# Patient Record
Sex: FEMALE | Race: WHITE | NOT HISPANIC OR LATINO | Employment: STUDENT | URBAN - METROPOLITAN AREA
[De-identification: names, ages, dates, MRNs, and addresses within clinical notes are randomized per-mention and may not be internally consistent; named-entity substitution may affect disease eponyms.]

---

## 2017-01-24 DIAGNOSIS — N95.1 SYMPTOMS, SUCH AS FLUSHING, SLEEPLESSNESS, HEADACHE, LACK OF CONCENTRATION, ASSOCIATED WITH THE MENOPAUSE: ICD-10-CM

## 2017-01-24 DIAGNOSIS — K64.4 EXTERNAL HEMORRHOIDS: Primary | ICD-10-CM

## 2017-01-26 RX ORDER — BENZOCAINE/MENTHOL 6 MG-10 MG
LOZENGE MUCOUS MEMBRANE
Qty: 28 G | Refills: 0 | Status: SHIPPED | OUTPATIENT
Start: 2017-01-26 | End: 2017-08-14

## 2017-01-26 RX ORDER — GABAPENTIN 300 MG/1
CAPSULE ORAL
Qty: 270 CAPSULE | Refills: 2 | Status: SHIPPED | OUTPATIENT
Start: 2017-01-26 | End: 2017-08-14

## 2017-01-26 NOTE — TELEPHONE ENCOUNTER
Pt stated that the pain from her hemorrhpids is occasional and comes and goes. Pt stated there have been periods of time without the pain from her hemorrhoid. Pt is requesting a refill incase she needs it.  Pt is also stating she takes gabapentin 600mg a day, and pt still has hot flashes and is wondering if the dose needs to be increased, still does not want to start HRT.   Note routed to Dr. Arceo to review and advise.

## 2017-01-26 NOTE — TELEPHONE ENCOUNTER
Ok to refill the cream. Is she doing 300mg BID on the gabapentin or 600mg all at once? The dosing is 300mg TID so can definitely increase to that and see if helps. If inadequate after a month or two should come back and see me to discuss other options aside from HRT

## 2017-01-26 NOTE — TELEPHONE ENCOUNTER
"Hydrocortisone 1%      Last Written Prescription Date:  9/12/16  Last Fill Quantity: 30,   # refills: 0  Last Office Visit with [facility] primary care provider:  9/12/16  Future Office visit: 9/15/17    POC note 9/12/16: \"Patient does have an acute hemorrhoid though thinks it's improving. rx sent in. If recurs or doesn't resolve will send a referral to [facility] for possible excision/banding.\"     Called pt and LMTCB. Did hemorrhoid ever resolve? Need to discuss if this is persisting for pt.   "

## 2017-01-26 NOTE — TELEPHONE ENCOUNTER
LM informing pt of information below. New Rx for gabapentin sent with TID instructions. Refills cream. Called pharmacy and cancelled Rx for BID

## 2017-03-31 ENCOUNTER — MYC MEDICAL ADVICE (OUTPATIENT)
Dept: OBGYN | Facility: CLINIC | Age: 47
End: 2017-03-31

## 2017-03-31 ENCOUNTER — TRANSFERRED RECORDS (OUTPATIENT)
Dept: HEALTH INFORMATION MANAGEMENT | Facility: CLINIC | Age: 47
End: 2017-03-31

## 2017-03-31 DIAGNOSIS — K64.9 HEMORRHOID: Primary | ICD-10-CM

## 2017-03-31 LAB
GLUCOSE SERPL-MCNC: 77 MG/DL (ref 65–99)
HBA1C MFR BLD: 5 % (ref 4–6)
TSH SERPL-ACNC: 0.82 UIU/ML (ref 0.3–5)

## 2017-03-31 NOTE — TELEPHONE ENCOUNTER
[facility] My guess is they will get her in with a PA first for eval and then they will likely recommend either flex sig or colonoscopy. Anyone at that clinic is great. Salo Noel, geetha.

## 2017-04-03 ENCOUNTER — TRANSFERRED RECORDS (OUTPATIENT)
Dept: HEALTH INFORMATION MANAGEMENT | Facility: CLINIC | Age: 47
End: 2017-04-03

## 2017-05-26 DIAGNOSIS — N95.1 SYMPTOMS, SUCH AS FLUSHING, SLEEPLESSNESS, HEADACHE, LACK OF CONCENTRATION, ASSOCIATED WITH THE MENOPAUSE: ICD-10-CM

## 2017-05-26 RX ORDER — GABAPENTIN 300 MG/1
CAPSULE ORAL
Qty: 270 CAPSULE | Refills: 1 | OUTPATIENT
Start: 2017-05-26

## 2017-05-26 NOTE — TELEPHONE ENCOUNTER
Gabapentin (Neurotin) 300 MG capsue-Take 1 capsule by mouth three times daily      Last Written Prescription Date:  1/26/17  Last Fill Quantity: 270 caps,   # refills: 2  Last Office Visit with [facility] primary care provider:  9/12/16-Annual  Future Office visit: 9/15/17-Annual    Refill request too soon. Rx denied as pt was sent a 9 month supply on 1/26/17.     Closing encounter.

## 2017-06-22 ENCOUNTER — HOSPITAL ENCOUNTER (OUTPATIENT)
Facility: CLINIC | Age: 47
Discharge: HOME | End: 2017-06-22
Attending: COLON & RECTAL SURGERY | Admitting: COLON & RECTAL SURGERY
Payer: COMMERCIAL

## 2017-06-22 ENCOUNTER — SURGERY (OUTPATIENT)
Age: 47
End: 2017-06-22

## 2017-06-22 VITALS
RESPIRATION RATE: 17 BRPM | WEIGHT: 143.3 LBS | HEIGHT: 62 IN | OXYGEN SATURATION: 95 % | BODY MASS INDEX: 26.37 KG/M2 | DIASTOLIC BLOOD PRESSURE: 58 MMHG | SYSTOLIC BLOOD PRESSURE: 88 MMHG

## 2017-06-22 LAB — COLONOSCOPY: NORMAL

## 2017-06-22 PROCEDURE — G0121 COLON CA SCRN NOT HI RSK IND: HCPCS | Performed by: COLON & RECTAL SURGERY

## 2017-06-22 PROCEDURE — 25000128 H RX IP 250 OP 636: Performed by: COLON & RECTAL SURGERY

## 2017-06-22 PROCEDURE — G0500 MOD SEDAT ENDO SERVICE >5YRS: HCPCS | Performed by: COLON & RECTAL SURGERY

## 2017-06-22 PROCEDURE — 45378 DIAGNOSTIC COLONOSCOPY: CPT | Performed by: COLON & RECTAL SURGERY

## 2017-06-22 PROCEDURE — 25000125 ZZHC RX 250: Performed by: COLON & RECTAL SURGERY

## 2017-06-22 RX ORDER — NALOXONE HYDROCHLORIDE 0.4 MG/ML
.1-.4 INJECTION, SOLUTION INTRAMUSCULAR; INTRAVENOUS; SUBCUTANEOUS
Status: CANCELLED | OUTPATIENT
Start: 2017-06-22 | End: 2017-06-23

## 2017-06-22 RX ORDER — PROCHLORPERAZINE MALEATE 5 MG
5-10 TABLET ORAL EVERY 6 HOURS PRN
Status: CANCELLED | OUTPATIENT
Start: 2017-06-22

## 2017-06-22 RX ORDER — FLUMAZENIL 0.1 MG/ML
0.2 INJECTION, SOLUTION INTRAVENOUS
Status: CANCELLED | OUTPATIENT
Start: 2017-06-22 | End: 2017-06-22

## 2017-06-22 RX ORDER — ONDANSETRON 2 MG/ML
4 INJECTION INTRAMUSCULAR; INTRAVENOUS
Status: DISCONTINUED | OUTPATIENT
Start: 2017-06-22 | End: 2017-06-22 | Stop reason: HOSPADM

## 2017-06-22 RX ORDER — ONDANSETRON 2 MG/ML
4 INJECTION INTRAMUSCULAR; INTRAVENOUS EVERY 6 HOURS PRN
Status: CANCELLED | OUTPATIENT
Start: 2017-06-22

## 2017-06-22 RX ORDER — LIDOCAINE 40 MG/G
CREAM TOPICAL
Status: DISCONTINUED | OUTPATIENT
Start: 2017-06-22 | End: 2017-06-22 | Stop reason: HOSPADM

## 2017-06-22 RX ORDER — ONDANSETRON 4 MG/1
4 TABLET, ORALLY DISINTEGRATING ORAL EVERY 6 HOURS PRN
Status: CANCELLED | OUTPATIENT
Start: 2017-06-22

## 2017-06-22 RX ORDER — FENTANYL CITRATE 50 UG/ML
INJECTION, SOLUTION INTRAMUSCULAR; INTRAVENOUS PRN
Status: DISCONTINUED | OUTPATIENT
Start: 2017-06-22 | End: 2017-06-22 | Stop reason: HOSPADM

## 2017-06-22 RX ADMIN — MIDAZOLAM HYDROCHLORIDE 2 MG: 1 INJECTION, SOLUTION INTRAMUSCULAR; INTRAVENOUS at 09:35

## 2017-06-22 RX ADMIN — FENTANYL CITRATE 100 MCG: 50 INJECTION, SOLUTION INTRAMUSCULAR; INTRAVENOUS at 09:35

## 2017-06-22 NOTE — H&P
Pre-Endoscopy History and Physical     Shital Parrish MRN# [id number]   YOB: 1970 Age: 46 year old     Date of Procedure: 6/22/2017  Primary care provider: Tim Hill  Type of Endoscopy: colonoscopy  Reason for Procedure: screening  Type of Anesthesia Anticipated: moderate sedation    HPI:    Shital is a 46 year old female who will be undergoing the above procedure. Patient denies a change in her bowel habits or bleeding. She has intermittent pain but has only had one episode in past 6 weeks.  Patient's mother had polyps in her 50s.       A history and physical has been performed. The patient's medications and allergies have been reviewed. The risks and benefits of the procedure and the sedation options and risks were discussed with the patient.  All questions were answered and informed consent was obtained.      She denies a personal or family history of anesthesia complications or bleeding disorders.     Prior to Admission medications    Medication Sig Start Date End Date Taking? Authorizing Provider   METHIMAZOLE PO    Yes Reported, Patient   gabapentin (NEURONTIN) 300 MG capsule Take 300mg po TID 1/26/17  Yes Shauna Arceo MD   Omega-3 Fatty Acids (OMEGA-3 FISH OIL PO)    Yes Reported, Patient   Cholecalciferol (VITAMIN D PO) Take  by mouth.   Yes Reported, Patient   Ascorbic Acid (VITAMIN C PO) Take  by mouth.   Yes Reported, Patient   hydrocortisone (CORTAID) 1 % cream APPLY SPARINGLY TO AFFECTED AREA THREE TIMES DAILY FOR 14 DAYS. 1/26/17   Shauna Arceo MD       Allergies   Allergen Reactions     Penicillins      Amoxicillin Rash        Current Facility-Administered Medications   Medication     lidocaine 1 % 1 mL     lidocaine (LMX4) cream     sodium chloride (PF) 0.9% PF flush 3 mL     sodium chloride (PF) 0.9% PF flush 3 mL     sodium chloride (PF) 0.9% PF flush 3 mL     ondansetron (ZOFRAN) injection 4 mg       Patient Active Problem List   Diagnosis     CARDIOVASCULAR SCREENING; LDL GOAL  LESS THAN 160     Hyperthyroidism     S/P abdominal supracervical subtotal hysterectomy     Elevated fasting glucose        Past Medical History:   Diagnosis Date     Edema 10/24/2013     Gastro-oesophageal reflux disease      Heavy periods 10/24/2013     Hyperthyroidism 2014    borderline-treated with a med for 6 months in [place] and then numbers improved. had a low TSH here and referred to [facility]. numbers there were borderline but normal and no antibodies. rec. -I123 uptake was increased so Graves dz dx'd. may do methimazole if repeat TFTs show hyperthyroid     Leiomyoma of uterus, unspecified 2014     Nipple discharge     right side only. neg mammo and right breast u/s. MRI pending     PONV (postoperative nausea and vomiting)      Varicose veins of lower extremities with other complications 10/24/2013        Past Surgical History:   Procedure Laterality Date     APPENDECTOMY        SECTION       CHOLECYSTECTOMY, LAPOROSCOPIC  2012    Cholecystectomy, Laparoscopic     HYSTERECTOMY SUPRACERVICAL N/A 2014    Procedure: HYSTERECTOMY SUPRACERVICAL;  Surgeon: Shauna Arceo MD;  Location: [facility] and [facility] done by Adis/ [facility]ora. started as laparoscopic but converted to open due to adhesions     LAPAROSCOPIC CYSTECTOMY OVARIAN (ONCOLOGY)      right. benign cyst. partial oopherectomy in [facility]     LAPAROSCOPIC SALPINGO-OOPHORECTOMY  14     MYOMECTOMY UTERUS  2008    hysteroscopic     TONSILLECTOMY         Social History   Substance Use Topics     Smoking status: Never Smoker     Smokeless tobacco: Never Used     Alcohol use No       Family History   Problem Relation Age of Onset     DIABETES Mother      CANCER Mother      melanoma     Colon Polyps Mother      DIABETES Maternal Grandmother      Breast Cancer Maternal Aunt      Thyroid Disease Cousin      Asthma No family hx of        REVIEW OF SYSTEMS:     5 point ROS negative except as noted above in HPI, including  "Gen., Resp., CV, GI &  system review.      PHYSICAL EXAM:   BP 98/76  Resp 16  Ht 1.575 m (5' 2\")  Wt 65 kg (143 lb 4.8 oz)  LMP 10/26/2014  SpO2 97%  BMI 26.21 kg/m2 Estimated body mass index is 26.21 kg/(m^2) as calculated from the following:    Height as of this encounter: 1.575 m (5' 2\").    Weight as of this encounter: 65 kg (143 lb 4.8 oz).   GENERAL APPEARANCE: healthy  MENTAL STATUS: alert  AIRWAY EXAM: Mallampatti Class I (visualization of the soft palate, fauces, uvula, anterior and posterior pillars)  RESP: lungs clear to auscultation - no rales, rhonchi or wheezes  CV: regular rates and rhythm      DIAGNOSTICS:    Not indicated      IMPRESSION   ASA Class 2 - Mild systemic disease        PLAN:       Colonoscopy with possible polypectomy, possible biopsy. The indications, procedure and risks were explained to the patient who agrees to proceed.       The above has been forwarded to the consulting provider.      Signed Electronically by: Shannon Noel  June 22, 2017          "

## 2017-06-22 NOTE — BRIEF OP NOTE
[facility] Brief Operative Note    Pre-operative diagnosis: FAMILY HISTORY OF POLYPS   Post-operative diagnosis normal colon     Procedure: Procedure(s):  COLONOSCOPY - Wound Class: II-Clean Contaminated   Surgeon(s): Surgeon(s) and Role:     * Shannon Noel MD - Primary   Estimated blood loss: * No values recorded between 6/22/2017 12:00 AM and 6/22/2017 10:09 AM *    Specimens: * No specimens in log *   Findings: See Provation procedure note in Epic

## 2017-06-28 ENCOUNTER — TRANSFERRED RECORDS (OUTPATIENT)
Dept: HEALTH INFORMATION MANAGEMENT | Facility: CLINIC | Age: 47
End: 2017-06-28

## 2017-07-07 ENCOUNTER — TRANSFERRED RECORDS (OUTPATIENT)
Dept: HEALTH INFORMATION MANAGEMENT | Facility: CLINIC | Age: 47
End: 2017-07-07

## 2017-08-14 ENCOUNTER — TRANSFERRED RECORDS (OUTPATIENT)
Dept: HEALTH INFORMATION MANAGEMENT | Facility: CLINIC | Age: 47
End: 2017-08-14

## 2017-08-14 ENCOUNTER — OFFICE VISIT (OUTPATIENT)
Dept: OBGYN | Facility: CLINIC | Age: 47
End: 2017-08-14
Payer: COMMERCIAL

## 2017-08-14 ENCOUNTER — RADIANT APPOINTMENT (OUTPATIENT)
Dept: MAMMOGRAPHY | Facility: CLINIC | Age: 47
End: 2017-08-14
Payer: COMMERCIAL

## 2017-08-14 VITALS
WEIGHT: 156.4 LBS | SYSTOLIC BLOOD PRESSURE: 96 MMHG | DIASTOLIC BLOOD PRESSURE: 60 MMHG | HEIGHT: 62 IN | HEART RATE: 82 BPM | BODY MASS INDEX: 28.78 KG/M2

## 2017-08-14 DIAGNOSIS — E05.90 HYPERTHYROIDISM: ICD-10-CM

## 2017-08-14 DIAGNOSIS — Z79.890 POST-MENOPAUSE ON HRT (HORMONE REPLACEMENT THERAPY): ICD-10-CM

## 2017-08-14 DIAGNOSIS — Z01.419 ENCOUNTER FOR GYNECOLOGICAL EXAMINATION WITHOUT ABNORMAL FINDING: Primary | ICD-10-CM

## 2017-08-14 DIAGNOSIS — Z12.31 VISIT FOR SCREENING MAMMOGRAM: ICD-10-CM

## 2017-08-14 DIAGNOSIS — N95.1 SYMPTOMS, SUCH AS FLUSHING, SLEEPLESSNESS, HEADACHE, LACK OF CONCENTRATION, ASSOCIATED WITH THE MENOPAUSE: ICD-10-CM

## 2017-08-14 DIAGNOSIS — Z90.711 S/P ABDOMINAL SUPRACERVICAL SUBTOTAL HYSTERECTOMY: ICD-10-CM

## 2017-08-14 PROCEDURE — 99396 PREV VISIT EST AGE 40-64: CPT | Performed by: OBSTETRICS & GYNECOLOGY

## 2017-08-14 PROCEDURE — 77063 BREAST TOMOSYNTHESIS BI: CPT | Mod: TC

## 2017-08-14 PROCEDURE — G0202 SCR MAMMO BI INCL CAD: HCPCS | Mod: TC

## 2017-08-14 RX ORDER — GABAPENTIN 300 MG/1
CAPSULE ORAL
Qty: 270 CAPSULE | Refills: 2 | Status: SHIPPED | OUTPATIENT
Start: 2017-08-14 | End: 2017-11-24

## 2017-08-14 RX ORDER — ESTRADIOL 0.1 MG/D
1 FILM, EXTENDED RELEASE TRANSDERMAL
Qty: 24 PATCH | Refills: 3 | Status: SHIPPED | OUTPATIENT
Start: 2017-08-14 | End: 2018-07-11

## 2017-08-14 ASSESSMENT — ANXIETY QUESTIONNAIRES
5. BEING SO RESTLESS THAT IT IS HARD TO SIT STILL: NOT AT ALL
6. BECOMING EASILY ANNOYED OR IRRITABLE: NOT AT ALL
1. FEELING NERVOUS, ANXIOUS, OR ON EDGE: NOT AT ALL
2. NOT BEING ABLE TO STOP OR CONTROL WORRYING: NOT AT ALL
GAD7 TOTAL SCORE: 0
3. WORRYING TOO MUCH ABOUT DIFFERENT THINGS: NOT AT ALL
7. FEELING AFRAID AS IF SOMETHING AWFUL MIGHT HAPPEN: NOT AT ALL

## 2017-08-14 ASSESSMENT — PATIENT HEALTH QUESTIONNAIRE - PHQ9
SUM OF ALL RESPONSES TO PHQ QUESTIONS 1-9: 2
5. POOR APPETITE OR OVEREATING: NOT AT ALL

## 2017-08-14 NOTE — PROGRESS NOTES
Shital is a 46 year old  female who presents for annual exam.     Besides routine health maintenance, she has no other health concerns today .    HPI:  Had her hysterectomy in . Does have her cvx and ovaries though. Has been getting hot flashes and night sweats almost since that time. However also had recurrence of her hyperthyroidism at the same time so was never clear what was causing what sx.   Patient is now seeing Carrasco for endo and on meds for her thyroid. Labs are unchanged. Was going to consider stopping her meds but now told her to stay on them 3 more months and repeat labs. Has about 2 more months before recheck.  Feels like her hot flashes and nightsweats aren't as sweaty as they used to be but all the sudden will just feel like her skin is burning. Having a lot of insomnia. Falling asleep is hard, staying asleep is hard. Definitely worsened recently when had some job renewal stress but now has her job for the year and sleep isn't better.   Doing the neurontin BID for nightsweats and hotflashes. Definitely some benefit from that but still not great. Isn't opposed to ERT and have discussed it before. Was hoping to avoid if possible but now that considering multiple different meds to control what just ERT could control is open to trying it.    GYNECOLOGIC HISTORY:    Patient's last menstrual period was 10/26/2014.. Using hysterectomy for contraception.    reports that she has never smoked. She has never used smokeless tobacco.      Patient is sexually active.  STD testing offered?  Declined  Patient is sexually active.  Last PHQ-9 score on record=   PHQ-9 SCORE 2017   Total Score 2         HEALTH MAINTENANCE:  Cholesterol: 16   Total= 199, Triglycerides=91, HDL=58, MIL=737, FBS=95, TSH=3/31/17-0.82 -patient is not fasting today  Last Mammo: 16, Result: normal, Next Mammo: today   Pap: 14 neg, HPV-  Colonoscopy: 17, Result: normal, Next Colonoscopy: 5 years    Health  maintenance updated:  yes    HISTORY:  Obstetric History       T0      L2     SAB2   TAB0   Ectopic0   Multiple0   Live Births2       # Outcome Date GA Lbr Rajesh/2nd Weight Sex Delivery Anes PTL Lv   6 Para 10/01/98    F CS-LTranv         Name: Robin   5 Para 96    F CS-LTranv         Name: Josh   4 TAB            3 TAB            2 SAB            1 SAB                 Past Medical History:   Diagnosis Date     Edema 10/24/2013     Gastro-oesophageal reflux disease      Heavy periods 10/24/2013     Hyperthyroidism 2014    borderline-treated with a med for 6 months in [facility] and then numbers improved. had a low TSH here and referred to [facility]. numbers there were borderline but normal and no antibodies. rec. -I123 uptake was increased so Graves dz dx'd. may do methimazole if repeat TFTs show hyperthyroid     Leiomyoma of uterus, unspecified 2014     Nipple discharge     right side only. neg mammo and right breast u/s. MRI pending     PONV (postoperative nausea and vomiting)      Varicose veins of lower extremities with other complications 10/24/2013     Past Surgical History:   Procedure Laterality Date     APPENDECTOMY        SECTION       CHOLECYSTECTOMY, LAPOROSCOPIC  2012    Cholecystectomy, Laparoscopic     COLONOSCOPY N/A 2017    Procedure: COLONOSCOPY;  COLONOSCOPY;  Surgeon: Shannon Noel MD;  Location:  [facility]     HYSTERECTOMY SUPRACERVICAL N/A 2014    Procedure: HYSTERECTOMY SUPRACERVICAL;  Surgeon: Shauna Arceo MD;  Location:  OR [facility] and LSO done by Adis/ Leora. started as laparoscopic but converted to open due to adhesions     LAPAROSCOPIC CYSTECTOMY OVARIAN (ONCOLOGY)      right. benign cyst. partial oopherectomy in [facility]     LAPAROSCOPIC SALPINGO-OOPHORECTOMY  14     MYOMECTOMY UTERUS  2008    hysteroscopic     TONSILLECTOMY  1979     Family History   Problem Relation Age of Onset     DIABETES Mother      CANCER  "Mother      melanoma     Colon Polyps Mother      DIABETES Maternal Grandmother      Breast Cancer Maternal Aunt      Thyroid Disease Cousin      Asthma No family hx of      Social History     Social History     Marital status:      Spouse name: Marianna     Number of children: 2     Years of education: 16     Occupational History     Program Leader      [employer]     Social History Main Topics     Smoking status: Never Smoker     Smokeless tobacco: Never Used     Alcohol use No     Drug use: No     Sexual activity: Yes     Partners: Male     Birth control/ protection: None      Comment: Premenopausal LMP 10/21/14     Other Topics Concern     Blood Transfusions No     Special Diet No     Exercise No     Bike Helmet Not Asked     NA     Seat Belt Yes     Social History Narrative    The patient was born in [facility]. She eats fruits and vegetables every day. She takes calcium supplement and vitamin D.        No advanced care directives on file               Current Outpatient Prescriptions:      gabapentin (NEURONTIN) 300 MG capsule, Take 300mg po TID, Disp: 270 capsule, Rfl: 2     estradiol (VIVELLE-DOT) 0.1 MG/24HR BIW patch, Place 1 patch onto the skin twice a week, Disp: 24 patch, Rfl: 3     METHIMAZOLE PO, , Disp: , Rfl:      Omega-3 Fatty Acids (OMEGA-3 FISH OIL PO), , Disp: , Rfl:      Cholecalciferol (VITAMIN D PO), Take  by mouth., Disp: , Rfl:      Ascorbic Acid (VITAMIN C PO), Take  by mouth., Disp: , Rfl:   Allergies   Allergen Reactions     Penicillins      Amoxicillin Rash       Past medical, surgical, social and family history were reviewed and updated in EPIC.    ROS:   12 point review of systems negative other than symptoms noted below.  Cardiovascular: Palpitations  Gastrointestinal: Bloating  Genitourinary: Vaginal Dryness  Endocrine: Excess Hair Growth    EXAM:  BP 96/60  Pulse 82  Ht 5' 2\" (1.575 m)  Wt 156 lb 6.4 oz (70.9 kg)  LMP 10/26/2014  BMI 28.61 kg/m2   BMI: " Body mass index is 28.61 kg/(m^2).    EXAM:  Constitutional: Appearance: Well nourished, well developed alert, in no acute distress  Neck:  Lymph Nodes:  No lymphadenopathy present    Thyroid:  Gland size normal, nontender, no nodules or masses present  on palpation  Chest:  Respiratory Effort:  Breathing unlabored  Cardiovascular:Heart    Auscultation:  Regular rate, normal rhythm, no murmurs present  Breasts: Palpation of Breasts and Axillae:  No masses present on palpation, no breast tenderness. and No nodularity, asymmetry or nipple discharge bilaterally.  Gastrointestinal:  Abdominal Examination:  Abdomen nontender to palpation, tone normal without     rigidity or guarding, no masses present, umbilicus without lesions    Liver and speen:  No hepatomegaly present, liver nontender to palpation    Hernias:  No hernias present  Lymphatic: Lymph Nodes:  No other lymphadenopathy present  Skin:  General Inspection:  No rashes present, no lesions present, no areas of  discoloration.    Genitalia and Groin:  No rashes present, no lesions present, no areas of  discoloration, no masses present  Neurologic/Psychiatric:    Mental Status:  Oriented X3     Pelvic Exam:  External Genitalia:     Normal appearance for age, no discharge present, no tenderness present, no inflammatory lesions present, color normal  Vagina:     Normal vaginal vault without central or paravaginal defects, no discharge present, no inflammatory lesions present, no masses present  Bladder:     Nontender to palpation  Urethra:   Urethral Body:  Urethra palpation normal, urethra structural support normal   Urethral Meatus:  No erythema or lesions present  Cervix:     Appearance healthy, no lesions present, nontender to palpation, no bleeding present  Uterus:     Surgically absent  Adnexa:     No adnexal tenderness present, no adnexal masses present  Perineum:     Perineum within normal limits, no evidence of trauma, no rashes or skin lesions  present  Anus:     Anus within normal limits, no hemorrhoids present  Inguinal Lymph Nodes:     No lymphadenopathy present  Pubic Hair:     Normal pubic hair distribution for age  Genitalia and Groin:     No rashes present, no lesions present, no areas of discoloration, no masses present      COUNSELING:   Reviewed preventive health counseling, as reflected in patient instructions  Special attention given to:        (Esther)menopause management    Body mass index is 28.61 kg/(m^2).  Weight management plan: deferred    ASSESSMENT:  46 year old female with satisfactory annual exam.    ICD-10-CM    1. Encounter for gynecological examination without abnormal finding Z01.419    2. Symptoms, such as flushing, sleeplessness, headache, lack of concentration, associated with the menopause N95.1 gabapentin (NEURONTIN) 300 MG capsule     estradiol (VIVELLE-DOT) 0.1 MG/24HR BIW patch   3. Post-menopause on HRT (hormone replacement therapy) Z79.890    4. S/P abdominal supracervical subtotal hysterectomy Z90.711    5. Hyperthyroidism E05.90        PLAN/PATIENT INSTRUCTIONS:    Pap is UTD for 2 more years and mammo today  Last fasting labs were normal last year. Not fasting this year.  Encouraged her to f/u with Dr. Carrasco and follow his recommendations on her hyperthyroidism. If he feels she can stop medication for a time then that is appropriate course of action.  Discussed her insomnia and hot flashes and nightsweats. Discussed continuing neurotin, otc sleep aids and prescription sleep aids. Discussed ERT and the risks of ERT vs HRT given that she only needs ERT.  Patient is interested in giving ERT a try. Will start at 0.1mg vivelle patch 2x/week given that she's only 46 and get sx on good control. Will follow up with me at end of December and determine how she's doing. Will then consider weaning to .075mg and then can slowly continue to wean down over time if able to. Patient is very agreeable with this plan.  Shauna Arceo,  MD

## 2017-08-14 NOTE — MR AVS SNAPSHOT
After Visit Summary   8/14/2017    Shital Parrish    MRN: [id number]           Patient Information     Date Of Birth          1970        Visit Information        Provider Department      8/14/2017 2:10 PM Shauna Arceo MD [facility]        Today's Diagnoses     Encounter for gynecological examination without abnormal finding    -  1    Symptoms, such as flushing, sleeplessness, headache, lack of concentration, associated with the menopause        Post-menopause on HRT (hormone replacement therapy)        S/P abdominal supracervical subtotal hysterectomy        Hyperthyroidism           Follow-ups after your visit        Your next 10 appointments already scheduled     Dec 27, 2017  3:30 PM CST   Office Visit with Shauna Arceo MD   [facility]    [address] [place] [id number] [phone]           Bring a current list of meds and any records pertaining to this visit. For Physicals, please bring immunization records and any forms needing to be filled out. Please arrive 10 minutes early to complete paperwork.              Who to contact     If you have questions or need follow up information about today's clinic visit or your schedule please contact [facility] directly at [phone].  Normal or non-critical lab and imaging results will be communicated to you by MyChart, letter or phone within 4 business days after the clinic has received the results. If you do not hear from us within 7 days, please contact the clinic through MyChart or phone. If you have a critical or abnormal lab result, we will notify you by phone as soon as possible.  Submit refill requests through My [employer] or call your pharmacy and they will forward the refill request to us. Please allow 3 business days for your refill to be completed.          Additional Information About Your Visit        MyChart  "Information     [facility] gives you secure access to your electronic health record. If you see a primary care provider, you can also send messages to your care team and make appointments. If you have questions, please call your primary care clinic.  If you do not have a primary care provider, please call [phone] and they will assist you.        Care EveryWhere ID     This is your Care EveryWhere ID. This could be used by other organizations to access your [place] medical records  [id number]        Your Vitals Were     Pulse Height Last Period BMI (Body Mass Index)          82 5' 2\" (1.575 m) 10/26/2014 28.61 kg/m2         Blood Pressure from Last 3 Encounters:   08/14/17 96/60   06/22/17 (!) 88/58   09/12/16 108/70    Weight from Last 3 Encounters:   08/14/17 156 lb 6.4 oz (70.9 kg)   06/22/17 143 lb 4.8 oz (65 kg)   09/12/16 149 lb (67.6 kg)              Today, you had the following     No orders found for display         Today's Medication Changes          These changes are accurate as of: 8/14/17 11:59 PM.  If you have any questions, ask your nurse or doctor.               Start taking these medicines.        Dose/Directions    estradiol 0.1 MG/24HR BIW patch   Commonly known as:  VIVELLE-DOT   Used for:  Symptoms, such as flushing, sleeplessness, headache, lack of concentration, associated with the menopause   Started by:  Shauna Arceo MD        Dose:  1 patch   Place 1 patch onto the skin twice a week   Quantity:  24 patch   Refills:  3            Where to get your medicines      These medications were sent to [facility] [address] [place] [employer] - [facility] [address]  [address] [facility] [address]     Phone:  [phone]     estradiol 0.1 MG/24HR BIW patch    gabapentin 300 MG capsule                Primary Care Provider Office Phone # Fax #    Tim Hill [phone]       [address] [facility] [address]        Equal Access to Services     " BRENNAN [facility]: Hadii aad roxie jhon Flor, waaxda luqadaha, qaybta kaalmada cassandra, mandie elsa lyssanikole trotter moizchaya early . So [facility] [phone].    ATENCIÓN: Si habla español, tiene a hobson disposición servicios gratuitos de asistencia lingüística. Llame al [phone].    We comply with applicable federal civil rights laws and [place] laws. We do not discriminate on the basis of race, color, national origin, age, disability sex, sexual orientation or gender identity.            Thank you!     Thank you for choosing [facility] [place]  for your care. Our goal is always to provide you with excellent care. Hearing back from our patients is one way we can continue to improve our services. Please take a few minutes to complete the written survey that you may receive in the mail after your visit with us. Thank you!             Your Updated Medication List - Protect others around you: Learn how to safely use, store and throw away your medicines at www.disposemymeds.org.          This list is accurate as of: 8/14/17 11:59 PM.  Always use your most recent med list.                   Brand Name Dispense Instructions for use Diagnosis    estradiol 0.1 MG/24HR BIW patch    VIVELLE-DOT    24 patch    Place 1 patch onto the skin twice a week    Symptoms, such as flushing, sleeplessness, headache, lack of concentration, associated with the menopause       gabapentin 300 MG capsule    NEURONTIN    270 capsule    Take 300mg po TID    Symptoms, such as flushing, sleeplessness, headache, lack of concentration, associated with the menopause       METHIMAZOLE PO           OMEGA-3 FISH OIL PO           VITAMIN C PO      Take  by mouth.        VITAMIN D PO      Take  by mouth.

## 2017-08-15 ASSESSMENT — ANXIETY QUESTIONNAIRES: GAD7 TOTAL SCORE: 0

## 2017-08-17 ENCOUNTER — TRANSFERRED RECORDS (OUTPATIENT)
Dept: HEALTH INFORMATION MANAGEMENT | Facility: CLINIC | Age: 47
End: 2017-08-17

## 2017-08-28 ENCOUNTER — TELEPHONE (OUTPATIENT)
Dept: OBGYN | Facility: CLINIC | Age: 47
End: 2017-08-28

## 2017-08-28 NOTE — TELEPHONE ENCOUNTER
Pt informed. Pt has stopped her neurontin as soon as you started patches. Z-quil she had been taking was stopped three days ago. Pt will cut her patches in half for about 1-2 weeks and then call us back with results.

## 2017-08-28 NOTE — TELEPHONE ENCOUNTER
It could definitely be the patches though it's unusual since her dose is normal starting dose in menopause. But it may just be too high for her. We can either send in 0.05mg patches and she can switch to those and see if it's better. Or she can just half of her patch off and save it so that she's doing 1/2 patch of the 0.1mg patch she has and see if her side effects are better. She may not get as much hot flash/nightsweat control on the lower dose but the nausea and dizziness may be better on that.    Let's try to get this sorted out first and if doing better in a week or 2 on the lower dose then have her call back to let us know taht and then can figure out what to do with the sleep. Is she still doing neurontin? Is she doing any otc sleep meds? Are her nightsweats/hotflashes gone on the patch at least?

## 2017-08-28 NOTE — TELEPHONE ENCOUNTER
PLEASE CALL PATIENT - RE: NEW MEDICATION, ESTROGEN - UNSURE IF SYMPTOMS ARE RELATED TO MEDICATION OR SOMETHING ELSE

## 2017-08-28 NOTE — TELEPHONE ENCOUNTER
8/14/17 Annual. Pt started estradiol (VIVELLE-DOT) 0.1 MG/24HR BIW patch last week. This is her second week and third patch. Pt is still not sleepy well. Pt is also feeling nausea and dizziness. No emesis Pt has feeling like she has been on a long ride and she is tired. Pt has tired eating it does not help the nausea. Pt is feeling dizzy and pt needs to lay down and rest. Pt denies fever or signs of flu like symptoms. BP's was 116/80 last week at other doctors office. Pt wondering if this is from her Patches or what else could be happening. Routing to Dr. Arceo. Please review and advise.     8/14/17 Discussed her insomnia and hot flashes and nightsweats. Discussed continuing neurotin, otc sleep aids and prescription sleep aids. Discussed ERT and the risks of ERT vs HRT given that she only needs ERT.  Patient is interested in giving ERT a try. Will start at 0.1mg vivelle patch 2x/week given that she's only 46 and get sx on good control. Will follow up with me at end of December and determine how she's doing. Will then consider weaning to .075mg and then can slowly continue to wean down over time if able to. Patient is very agreeable with this plan.

## 2017-11-24 ENCOUNTER — OFFICE VISIT (OUTPATIENT)
Dept: FAMILY MEDICINE | Facility: CLINIC | Age: 47
End: 2017-11-24
Payer: COMMERCIAL

## 2017-11-24 VITALS
DIASTOLIC BLOOD PRESSURE: 64 MMHG | HEART RATE: 80 BPM | HEIGHT: 63 IN | RESPIRATION RATE: 14 BRPM | SYSTOLIC BLOOD PRESSURE: 108 MMHG | TEMPERATURE: 98.1 F | WEIGHT: 161 LBS | BODY MASS INDEX: 28.53 KG/M2

## 2017-11-24 DIAGNOSIS — E05.90 HYPERTHYROIDISM: ICD-10-CM

## 2017-11-24 DIAGNOSIS — R73.01 ELEVATED FASTING GLUCOSE: ICD-10-CM

## 2017-11-24 DIAGNOSIS — Z00.00 HEALTH CARE MAINTENANCE: Primary | ICD-10-CM

## 2017-11-24 DIAGNOSIS — G47.00 INSOMNIA, UNSPECIFIED TYPE: ICD-10-CM

## 2017-11-24 LAB
ERYTHROCYTE [DISTWIDTH] IN BLOOD BY AUTOMATED COUNT: 12.3 % (ref 10–15)
HCT VFR BLD AUTO: 39.4 % (ref 35–47)
HGB BLD-MCNC: 13.3 G/DL (ref 11.7–15.7)
MCH RBC QN AUTO: 30.6 PG (ref 26.5–33)
MCHC RBC AUTO-ENTMCNC: 33.8 G/DL (ref 31.5–36.5)
MCV RBC AUTO: 91 FL (ref 78–100)
PLATELET # BLD AUTO: 264 10E9/L (ref 150–450)
RBC # BLD AUTO: 4.35 10E12/L (ref 3.8–5.2)
WBC # BLD AUTO: 8.6 10E9/L (ref 4–11)

## 2017-11-24 PROCEDURE — 36415 COLL VENOUS BLD VENIPUNCTURE: CPT | Performed by: FAMILY MEDICINE

## 2017-11-24 PROCEDURE — 84460 ALANINE AMINO (ALT) (SGPT): CPT | Performed by: FAMILY MEDICINE

## 2017-11-24 PROCEDURE — 99396 PREV VISIT EST AGE 40-64: CPT | Performed by: FAMILY MEDICINE

## 2017-11-24 PROCEDURE — 80048 BASIC METABOLIC PNL TOTAL CA: CPT | Performed by: FAMILY MEDICINE

## 2017-11-24 PROCEDURE — 80061 LIPID PANEL: CPT | Performed by: FAMILY MEDICINE

## 2017-11-24 PROCEDURE — 85027 COMPLETE CBC AUTOMATED: CPT | Performed by: FAMILY MEDICINE

## 2017-11-24 NOTE — MR AVS SNAPSHOT
After Visit Summary   11/24/2017    Shital Parrish    MRN: [id number]           Patient Information     Date Of Birth          1970        Visit Information        Provider Department      11/24/2017 10:00 AM Tim Hill MD [facility]        Today's Diagnoses     Health care maintenance    -  1    Hyperthyroidism        Elevated fasting glucose        Insomnia, unspecified type          Care Instructions      Preventive Health Recommendations  Female Ages 40 to 49    Yearly exam:     See your health care provider every year in order to  1. Review health changes.   2. Discuss preventive care.    3. Review your medicines if your doctor prescribed any.      Get a Pap test every three years (unless you have an abnormal result and your provider advises testing more often).      If you get Pap tests with HPV test, you only need to test every 5 years, unless you have an abnormal result. You do not need a Pap test if your uterus was removed (hysterectomy) and you have not had cancer.      You should be tested each year for STDs (sexually transmitted diseases), if you're at risk.       Ask your doctor if you should have a mammogram.      Have a colonoscopy (test for colon cancer) if someone in your family has had colon cancer or polyps before age 50.       Have a cholesterol test every 5 years.       Have a diabetes test (fasting glucose) after age 45. If you are at risk for diabetes, you should have this test every 3 years.    Shots: Get a flu shot each year. Get a tetanus shot every 10 years.     Nutrition:     Eat at least 5 servings of fruits and vegetables each day.    Eat whole-grain bread, whole-wheat pasta and brown rice instead of white grains and rice.    Talk to your provider about Calcium and Vitamin D.     Lifestyle    Exercise at least 150 minutes a week (an average of 30 minutes a day, 5 days a week). This will help you control your weight and prevent disease.    Limit  alcohol to one drink per day.    No smoking.     Wear sunscreen to prevent skin cancer.    See your dentist every six months for an exam and cleaning.  Preventive Health Recommendations  Female Ages 40 to 49    Yearly exam:   See your health care provider every year in order to  Review health changes.   Discuss preventive care.    Review your medicines if your doctor prescribed any.    Get a Pap test every three years (unless you have an abnormal result and your provider advises testing more often).    If you get Pap tests with HPV test, you only need to test every 5 years, unless you have an abnormal result. You do not need a Pap test if your uterus was removed (hysterectomy) and you have not had cancer.    You should be tested each year for STDs (sexually transmitted diseases), if you're at risk.     Ask your doctor if you should have a mammogram.    Have a colonoscopy (test for colon cancer) if someone in your family has had colon cancer or polyps before age 50.     Have a cholesterol test every 5 years.     Have a diabetes test (fasting glucose) after age 45. If you are at risk for diabetes, you should have this test every 3 years.    Shots: Get a flu shot each year. Get a tetanus shot every 10 years.     Nutrition:   Eat at least 5 servings of fruits and vegetables each day.  Eat whole-grain bread, whole-wheat pasta and brown rice instead of white grains and rice.  Talk to your provider about Calcium and Vitamin D.     Lifestyle  Exercise at least 150 minutes a week (an average of 30 minutes a day, 5 days a week). This will help you control your weight and prevent disease.  Limit alcohol to one drink per day.  No smoking.   Wear sunscreen to prevent skin cancer.  See your dentist every six months for an exam and cleaning.          Follow-ups after your visit        Additional Services     SLEEP EVALUATION & MANAGEMENT REFERRAL - [facility] [phone]  (Age 18 and up)        Please be aware that coverage of these services is subject to the terms and limitations of your health insurance plan.  Call member services at your health plan with any benefit or coverage questions.      Please bring the following to your appointment:    >>   List of current medications   >>   This referral request   >>   Any documents/labs given to you for this referral                      Your next 10 appointments already scheduled     Dec 08, 2017  8:30 AM CST   Office Visit with Shauna Arceo MD   [facility] ([facility])    [address] [place] [phone]           Bring a current list of meds and any records pertaining to this visit. For Physicals, please bring immunization records and any forms needing to be filled out. Please arrive 10 minutes early to complete paperwork.              Future tests that were ordered for you today     Open Future Orders        Priority Expected Expires Ordered    SLEEP EVALUATION & MANAGEMENT REFERRAL - ADULT -[facility] [phone]  (Age 18 and up) Routine  11/24/2018 11/24/2017            Who to contact     If you have questions or need follow up information about today's clinic visit or your schedule please contact [facility] [place] [place] directly at [phone].  Normal or non-critical lab and imaging results will be communicated to you by MyChart, letter or phone within 4 business days after the clinic has received the results. If you do not hear from us within 7 days, please contact the clinic through MyChart or phone. If you have a critical or abnormal lab result, we will notify you by phone as soon as possible.  Submit refill requests through [employer] or call your pharmacy and they will forward the refill request to us. Please allow 3 business days for your refill to be completed.          Additional Information About Your Visit        MyChart Information  "    [employer] gives you secure access to your electronic health record. If you see a primary care provider, you can also send messages to your care team and make appointments. If you have questions, please call your primary care clinic.  If you do not have a primary care provider, please call [phone] and they will assist you.        Care EveryWhere ID     This is your Care EveryWhere ID. This could be used by other organizations to access your [facility] medical records  [id number]        Your Vitals Were     Pulse Temperature Respirations Height Last Period BMI (Body Mass Index)    80 98.1  F (36.7  C) (Tympanic) 14 5' 3.31\" (1.608 m) 10/26/2014 28.24 kg/m2       Blood Pressure from Last 3 Encounters:   11/24/17 108/64   08/14/17 96/60   06/22/17 (!) 88/58    Weight from Last 3 Encounters:   11/24/17 161 lb (73 kg)   08/14/17 156 lb 6.4 oz (70.9 kg)   06/22/17 143 lb 4.8 oz (65 kg)              We Performed the Following     ALT     Basic metabolic panel  (Ca, Cl, CO2, Creat, Gluc, K, Na, BUN)     CBC with platelets     Lipid panel reflex to direct LDL Fasting        Primary Care Provider Office Phone # Fax #    Tim WHARTON MD [facility] [phone]       [address] [facility] [address]        Equal Access to Services     [facility]: Hadii antonio ku hadasho Sodebora, waaxda luqadaha, qaybta kaalmada adesaniyayada, mandie chaudhary. [facility] [phone].    ATENCIÓN: Si habla español, tiene a hobson disposición servicios gratuitos de asistencia lingüística. Llame al [phone].    We comply with applicable federal civil rights laws and [place] laws. We do not discriminate on the basis of race, color, national origin, age, disability, sex, sexual orientation, or gender identity.            Thank you!     Thank you for choosing [facility]  for your care. Our goal is always to provide you with excellent care. Hearing back from our patients is one way we can " continue to improve our services. Please take a few minutes to complete the written survey that you may receive in the mail after your visit with us. Thank you!             Your Updated Medication List - Protect others around you: Learn how to safely use, store and throw away your medicines at www.disposemymeds.org.          This list is accurate as of: 11/24/17 10:42 AM.  Always use your most recent med list.                   Brand Name Dispense Instructions for use Diagnosis    estradiol 0.1 MG/24HR BIW patch    VIVELLE-DOT    24 patch    Place 1 patch onto the skin twice a week    Symptoms, such as flushing, sleeplessness, headache, lack of concentration, associated with the menopause       METHIMAZOLE PO           OMEGA-3 FISH OIL PO           VITAMIN C PO      Take  by mouth.        VITAMIN D PO      Take  by mouth.

## 2017-11-24 NOTE — PROGRESS NOTES
SUBJECTIVE:   CC: Shital Parrish is an 47 year old woman who presents for preventive health visit.     Healthy Habits:    Do you get at least three servings of calcium containing foods daily (dairy, green leafy vegetables, etc.)? YES    Amount of exercise or daily activities, outside of work: walks a lot during the day and trying to get more exercise    Problems taking medications regularly No    Medication side effects: No    Have you had an eye exam in the past two years? yes    Do you see a dentist twice per year? yes    Do you have sleep apnea, excessive snoring or daytime drowsiness?no            Today's PHQ-2 Score: PHQ-2 (  [employer]) 2016   Q1: Little interest or pleasure in doing things 0 0   Q2: Feeling down, depressed or hopeless 0 0   PHQ-2 Score 0 0         Abuse: Current or Past(Physical, Sexual or Emotional)- No  Do you feel safe in your environment - Yes  Social History   Substance Use Topics     Smoking status: Never Smoker     Smokeless tobacco: Never Used     Alcohol use No     The patient does not drink >3 drinks per day nor >7 drinks per week.    Reviewed orders with patient.  Reviewed health maintenance and updated orders accordingly - Yes  BP Readings from Last 3 Encounters:   17 108/64   17 96/60   17 (!) 88/58    Wt Readings from Last 3 Encounters:   17 161 lb (73 kg)   17 156 lb 6.4 oz (70.9 kg)   17 143 lb 4.8 oz (65 kg)                  Patient Active Problem List   Diagnosis     CARDIOVASCULAR SCREENING; LDL GOAL LESS THAN 160     Hyperthyroidism     S/P abdominal supracervical subtotal hysterectomy     Elevated fasting glucose     Past Surgical History:   Procedure Laterality Date     APPENDECTOMY        SECTION       CHOLECYSTECTOMY, LAPOROSCOPIC  2012    Cholecystectomy, Laparoscopic     COLONOSCOPY N/A 2017    Procedure: COLONOSCOPY;  COLONOSCOPY;  Surgeon: Shannon Noel MD;  Location: [facility]      HYSTERECTOMY SUPRACERVICAL N/A 11/11/2014    Procedure: HYSTERECTOMY SUPRACERVICAL;  Surgeon: Shauna Arceo MD;  Location: [facility] and [facility] done by Ad[facility]/ [facility]. started as laparoscopic but converted to open due to adhesions     LAPAROSCOPIC CYSTECTOMY OVARIAN (ONCOLOGY)      right. benign cyst. partial oopherectomy in [place]     LAPAROSCOPIC SALPINGO-OOPHORECTOMY  11/11/14     MYOMECTOMY UTERUS  2008    hysteroscopic     TONSILLECTOMY  1979       Social History   Substance Use Topics     Smoking status: Never Smoker     Smokeless tobacco: Never Used     Alcohol use No     Family History   Problem Relation Age of Onset     DIABETES Mother      CANCER Mother      melanoma     Colon Polyps Mother      DIABETES Maternal Grandmother      Breast Cancer Maternal Aunt      Thyroid Disease Cousin      Asthma No family hx of          Current Outpatient Prescriptions   Medication Sig Dispense Refill     estradiol (VIVELLE-DOT) 0.1 MG/24HR BIW patch Place 1 patch onto the skin twice a week 24 patch 3     METHIMAZOLE PO        Omega-3 Fatty Acids (OMEGA-3 FISH OIL PO)        Cholecalciferol (VITAMIN D PO) Take  by mouth.       Ascorbic Acid (VITAMIN C PO) Take  by mouth.       Allergies   Allergen Reactions     Penicillins      Amoxicillin Rash     Recent Labs   Lab Test 03/31/17 09/12/16   1116  04/05/16   1153  09/30/15   1127  09/11/15   1115   09/26/14   1201  05/24/13   A1C  5.0   --    --    --   5.2   --    --    --    --    LDL   --   123*   --    --   125   --   125   < >  124   HDL   --   58   --    --   52   --   52   < >  57   TRIG   --   91   --    --   35   --   77   < >  102   ALT   --   23   --   29   --    --    --    --   16   CR   --   0.59   --   0.63   --    --    --    --   0.6   GFRESTIMATED   --   >90  Non  GFR Calc     --   >90  Non  GFR Calc     --    --    --    --    --    GFRESTBLACK   --   >90   GFR Calc     --   >90   GFR  Calc     --    --    --    --    --    POTASSIUM   --   5.3   --   3.7   --    < >   --    --    --    TSH  0.82   --   0.63   --    --    < >   --    < >   --     < > = values in this interval not displayed.              Mammogram not appropriate for this patient based on age.    Pertinent mammograms are reviewed under the imaging tab.  History of abnormal Pap smear: NO - age 30- 65 PAP every 3 years recommended    Reviewed and updated as needed this visit by clinical staffTobacco  Allergies  Meds  Med Hx  Surg Hx  Fam Hx  Soc Hx        Reviewed and updated as needed this visit by Provider        Past Medical History:   Diagnosis Date     Edema 10/24/2013     Gastro-oesophageal reflux disease      Heavy periods 10/24/2013     Hyperthyroidism 2014    borderline-treated with a med for 6 months in [place] and then numbers improved. had a low TSH here and referred to [facility]. numbers there were borderline but normal and no antibodies. rec. -I123 uptake was increased so Graves dz dx'd. may do methimazole if repeat TFTs show hyperthyroid     Leiomyoma of uterus, unspecified 2014     Nipple discharge     right side only. neg mammo and right breast u/s. MRI pending     PONV (postoperative nausea and vomiting)      Varicose veins of lower extremities with other complications 10/24/2013      Past Surgical History:   Procedure Laterality Date     APPENDECTOMY        SECTION       CHOLECYSTECTOMY, LAPOROSCOPIC  2012    Cholecystectomy, Laparoscopic     COLONOSCOPY N/A 2017    Procedure: COLONOSCOPY;  COLONOSCOPY;  Surgeon: Shannon Noel MD;  Location:  [facility]     HYSTERECTOMY SUPRACERVICAL N/A 2014    Procedure: HYSTERECTOMY SUPRACERVICAL;  Surgeon: Shauna Arceo MD;  Location:  OR [facility] and LSO done by Adis/ [facility]. started as laparoscopic but converted to open due to adhesions     LAPAROSCOPIC CYSTECTOMY OVARIAN (ONCOLOGY)      right. benign cyst. partial  "oopherectomy in [facility]     LAPAROSCOPIC SALPINGO-OOPHORECTOMY  11/11/14     MYOMECTOMY UTERUS  2008    hysteroscopic     TONSILLECTOMY  1979       ROS:  C: NEGATIVE for fever, chills, change in weight  I: NEGATIVE for worrisome rashes, moles or lesions  E: NEGATIVE for vision changes or irritation  ENT: NEGATIVE for ear, mouth and throat problems  R: NEGATIVE for significant cough or SOB  B: NEGATIVE for masses, tenderness or discharge  CV: NEGATIVE for chest pain, palpitations or peripheral edema  GI: NEGATIVE for nausea, abdominal pain, heartburn, or change in bowel habits  : NEGATIVE for unusual urinary or vaginal symptoms. Periods are regular.  M: NEGATIVE for significant arthralgias or myalgia  N: NEGATIVE for weakness, dizziness or paresthesias  P: NEGATIVE for changes in mood or affect    OBJECTIVE:   /64  Pulse 80  Temp 98.1  F (36.7  C) (Tympanic)  Resp 14  Ht 5' 3.31\" (1.608 m)  Wt 161 lb (73 kg)  LMP 10/26/2014  BMI 28.24 kg/m2  EXAM:  GENERAL: healthy, alert and no distress  NECK: no adenopathy, no asymmetry, masses, or scars and thyroid normal to palpation  RESP: lungs clear to auscultation - no rales, rhonchi or wheezes  CV: regular rate and rhythm, normal S1 S2, no S3 or S4, no murmur, click or rub, no peripheral edema and peripheral pulses strong  ABDOMEN: soft, nontender, no hepatosplenomegaly, no masses and bowel sounds normal  MS: no gross musculoskeletal defects noted, no edema  SKIN: no suspicious lesions or rashes  NEURO: Normal strength and tone, mentation intact and speech normal  BACK: no CVA tenderness, no paralumbar tenderness  PSYCH: mentation appears normal, affect normal/bright  LYMPH: no cervical, supraclavicular, axillary, or inguinal adenopathy    ASSESSMENT/PLAN:       ICD-10-CM    1. Health care maintenance Z00.00 CBC with platelets     Basic metabolic panel  (Ca, Cl, CO2, Creat, Gluc, K, Na, BUN)     ALT     Lipid panel reflex to direct LDL Fasting   2. " "Hyperthyroidism E05.90    3. Elevated fasting glucose R73.01 Basic metabolic panel  (Ca, Cl, CO2, Creat, Gluc, K, Na, BUN)     ALT     Lipid panel reflex to direct LDL Fasting   4. Insomnia, unspecified type G47.00 SLEEP EVALUATION & MANAGEMENT REFERRAL - ADULT -[facility] [phone]  (Age 18 and up)       COUNSELING:   Reviewed preventive health counseling, as reflected in patient instructions         reports that she has never smoked. She has never used smokeless tobacco.    Estimated body mass index is 28.24 kg/(m^2) as calculated from the following:    Height as of this encounter: 5' 3.31\" (1.608 m).    Weight as of this encounter: 161 lb (73 kg).         Counseling Resources:  ATP IV Guidelines  Pooled Cohorts Equation Calculator  Breast Cancer Risk Calculator  FRAX Risk Assessment  ICSI Preventive Guidelines  Dietary Guidelines for Americans, 2010  USDA's MyPlate  ASA Prophylaxis  Lung CA Screening    Tim Hill MD  [facility] [place]  "

## 2017-11-24 NOTE — NURSING NOTE
"Chief Complaint   Patient presents with     Physical     is fasting       Initial /64  Pulse 80  Temp 98.1  F (36.7  C) (Tympanic)  Resp 14  Ht 5' 3.31\" (1.608 m)  Wt 161 lb (73 kg)  LMP 10/26/2014  BMI 28.24 kg/m2 Estimated body mass index is 28.24 kg/(m^2) as calculated from the following:    Height as of this encounter: 5' 3.31\" (1.608 m).    Weight as of this encounter: 161 lb (73 kg).  Medication Reconciliation: complete   Inez Jarrell CMA      "

## 2017-11-25 LAB
ALT SERPL W P-5'-P-CCNC: 23 U/L (ref 0–50)
ANION GAP SERPL CALCULATED.3IONS-SCNC: 6 MMOL/L (ref 3–14)
BUN SERPL-MCNC: 16 MG/DL (ref 7–30)
CALCIUM SERPL-MCNC: 8.5 MG/DL (ref 8.5–10.1)
CHLORIDE SERPL-SCNC: 107 MMOL/L (ref 94–109)
CHOLEST SERPL-MCNC: 215 MG/DL
CO2 SERPL-SCNC: 29 MMOL/L (ref 20–32)
CREAT SERPL-MCNC: 0.56 MG/DL (ref 0.52–1.04)
GFR SERPL CREATININE-BSD FRML MDRD: >90 ML/MIN/1.7M2
GLUCOSE SERPL-MCNC: 81 MG/DL (ref 70–99)
HDLC SERPL-MCNC: 65 MG/DL
LDLC SERPL CALC-MCNC: 134 MG/DL
NONHDLC SERPL-MCNC: 150 MG/DL
POTASSIUM SERPL-SCNC: 3.7 MMOL/L (ref 3.4–5.3)
SODIUM SERPL-SCNC: 142 MMOL/L (ref 133–144)
TRIGL SERPL-MCNC: 82 MG/DL

## 2017-12-08 ENCOUNTER — OFFICE VISIT (OUTPATIENT)
Dept: OBGYN | Facility: CLINIC | Age: 47
End: 2017-12-08
Payer: COMMERCIAL

## 2017-12-08 VITALS
BODY MASS INDEX: 29.59 KG/M2 | DIASTOLIC BLOOD PRESSURE: 70 MMHG | SYSTOLIC BLOOD PRESSURE: 112 MMHG | HEIGHT: 63 IN | WEIGHT: 167 LBS

## 2017-12-08 DIAGNOSIS — Z79.890 POST-MENOPAUSE ON HRT (HORMONE REPLACEMENT THERAPY): ICD-10-CM

## 2017-12-08 DIAGNOSIS — F51.01 PRIMARY INSOMNIA: ICD-10-CM

## 2017-12-08 DIAGNOSIS — N95.1 PERIMENOPAUSAL VASOMOTOR SYMPTOMS: Primary | ICD-10-CM

## 2017-12-08 DIAGNOSIS — F41.8 SITUATIONAL ANXIETY: ICD-10-CM

## 2017-12-08 PROCEDURE — 99213 OFFICE O/P EST LOW 20 MIN: CPT | Performed by: OBSTETRICS & GYNECOLOGY

## 2017-12-08 NOTE — PROGRESS NOTES
SUBJECTIVE:                                                   Shital Parrish is a 47 year old female who presents to clinic today for the following health issue(s):  Patient presents with:  Recheck Medication: patient is here to discuss vivelle patch for ERT. she is back on a normal dose patch vs cutting the patch in half. states doing well      HPI:  Has had a LASH and in the last year or two started to have significant hot flashes and nightsweats. Bad insomnia as well.  Was in a few months ago and decided to try estradiol patch after discussing r/b/a  Started on a vivelle 0.1mg and in the first week or so was dizzy and had some HAs and didn't feel right. She called in and we told her to cut her patch in half for a couple weeks and see how she felt on that dose.  Patient states that she actually never did that. Just stayed on the full patch and waited it out and now is feeling much better. No S.E, vasomotor sx are all gone and feeling really well from that standpoint. Thinks she'd like to just stay on this dose for now.    Her insomnia is not better though. Not sure if it's her or the fact that her  has such bad snoring. Can fall asleep ok but then feels like within an hour or two she is just constantly not in a deep sleep and is aware of herself and her thoughts all night long. Actually both are going for formal sleep eval next week so hoping to figure it out.    Only other social stressor that is contributing to her sleep possibly is that [place] told her that she's a lesbian and even though she feels like she didn't take it well she is fine with it just wants her to be safe. kendrick took it as nonacceptance and now hasn't talked to her in weeks. Coming home for xmas break but worried she'll just hide up in her room and not talk to her.    Patient's last menstrual period was 10/26/2014..   Patient is sexually active, .  Using hysterectomy for contraception.    reports that she has never smoked. She  has never used smokeless tobacco.      STD testing offered?  Declined    Health maintenance updated:  yes    Today's PHQ-2 Score:   PHQ-2 (  [employer]) 2017   Q1: Little interest or pleasure in doing things 0   Q2: Feeling down, depressed or hopeless 0   PHQ-2 Score 0     Today's PHQ-9 Score:   PHQ-9 SCORE 2017   Total Score 2     Today's RACHEL-7 Score:   RACHEL-7 SCORE 2017   Total Score 0       Problem list and histories reviewed & adjusted, as indicated.  Additional history: as documented.    Patient Active Problem List   Diagnosis     CARDIOVASCULAR SCREENING; LDL GOAL LESS THAN 160     Hyperthyroidism     S/P abdominal supracervical subtotal hysterectomy     Elevated fasting glucose     Past Surgical History:   Procedure Laterality Date     APPENDECTOMY        SECTION       CHOLECYSTECTOMY, LAPOROSCOPIC  2012    Cholecystectomy, Laparoscopic     COLONOSCOPY N/A 2017    Procedure: COLONOSCOPY;  COLONOSCOPY;  Surgeon: Shannon Noel MD;  Location:  GI     HYSTERECTOMY SUPRACERVICAL N/A 2014    Procedure: HYSTERECTOMY SUPRACERVICAL;  Surgeon: Shauna Arceo MD;  Location:  OR SChyst and LSO done by Adis/ Leora. started as laparoscopic but converted to open due to adhesions     LAPAROSCOPIC CYSTECTOMY OVARIAN (ONCOLOGY)      right. benign cyst. partial oopherectomy in [facility]     LAPAROSCOPIC SALPINGO-OOPHORECTOMY  14     MYOMECTOMY UTERUS  2008    hysteroscopic     TONSILLECTOMY        Social History   Substance Use Topics     Smoking status: Never Smoker     Smokeless tobacco: Never Used     Alcohol use No      Problem (# of Occurrences) Relation (Name,Age of Onset)    Breast Cancer (1) Maternal Aunt    CANCER (1) Mother: melanoma    Colon Polyps (1) Mother    DIABETES (2) Mother, Maternal Grandmother    Thyroid Disease (1) Cousin       Negative family history of: Asthma            Current Outpatient Prescriptions   Medication Sig     estradiol  "(VIVELLE-DOT) 0.1 MG/24HR BIW patch Place 1 patch onto the skin twice a week     METHIMAZOLE PO      Omega-3 Fatty Acids (OMEGA-3 FISH OIL PO)      Cholecalciferol (VITAMIN D PO) Take  by mouth.     Ascorbic Acid (VITAMIN C PO) Take  by mouth.     No current facility-administered medications for this visit.      Allergies   Allergen Reactions     Penicillins      Amoxicillin Rash       ROS:  12 point review of systems negative other than symptoms noted below.    OBJECTIVE:     /70  Ht 5' 3\" (1.6 m)  Wt 167 lb (75.8 kg)  LMP 10/26/2014  BMI 29.58 kg/m2  Body mass index is 29.58 kg/(m^2).    Exam:  Constitutional:  Appearance: Well nourished, well developed alert, in no acute distress     In-Clinic Test Results:  No results found for this or any previous visit (from the past 24 hour(s)).    ASSESSMENT/PLAN:                                                        ICD-10-CM    1. Perimenopausal vasomotor symptoms N95.1    2. Post-menopause on HRT (hormone replacement therapy) Z79.890    3. Primary insomnia F51.01    4. Situational anxiety F41.8          Patient is doing well on her ERT and no vasomotor sx and now no side effects. Would prefer to just leave this dose as is and reassess every 1-2 yrs regarding trying to wean. Again went over the r/b/a of ERT vs HRT and patient wishes to continue.  Discussed options for insomnia especially given her current life stressor. Discussed otc sleep aids and rx aids. Since has formal sleep study coming will defer to that assessment first   Offered support for the situation with her daughter. Given names of some therapists who could possibly help to facilitate and mediate an open discussion so that they can both hear each other and understand.  Spent 15 minutes with the patient all of which was in counseling time.    Shauna Arceo MD  [facility]  "

## 2017-12-08 NOTE — MR AVS SNAPSHOT
After Visit Summary   12/8/2017    Shital Parrish    MRN: [id number]           Patient Information     Date Of Birth          1970        Visit Information        Provider Department      12/8/2017 8:30 AM [facility]        Today's Diagnoses     Perimenopausal vasomotor symptoms    -  1    Post-menopause on HRT (hormone replacement therapy)        Primary insomnia        Situational anxiety           Follow-ups after your visit        Your next 10 appointments already scheduled     Dec 18, 2017  3:30 PM CST   New Sleep Patient with Yrn Aldrich MD   [facility] ([facility])    [address] [address] [phone]              Who to contact     If you have questions or need follow up information about today's clinic visit or your schedule please contact [facility] [place] directly at [phone].  Normal or non-critical lab and imaging results will be communicated to you by MyChart, letter or phone within 4 business days after the clinic has received the results. If you do not hear from us within 7 days, please contact the clinic through [employer] or phone. If you have a critical or abnormal lab result, we will notify you by phone as soon as possible.  Submit refill requests through [employer] or call your pharmacy and they will forward the refill request to us. Please allow 3 business days for your refill to be completed.          Additional Information About Your Visit        MyChart Information     [employer] gives you secure access to your electronic health record. If you see a primary care provider, you can also send messages to your care team and make appointments. If you have questions, please call your primary care clinic.  If you do not have a primary care provider, please call [phone] and they will assist you.        Care EveryWhere ID     This is your Care EveryWhere  "ID. This could be used by other organizations to access your [facility] medical records  [id number]        Your Vitals Were     Height Last Period BMI (Body Mass Index)             5' 3\" (1.6 m) 10/26/2014 29.58 kg/m2          Blood Pressure from Last 3 Encounters:   12/08/17 112/70   11/24/17 108/64   08/14/17 96/60    Weight from Last 3 Encounters:   12/08/17 167 lb (75.8 kg)   11/24/17 161 lb (73 kg)   08/14/17 156 lb 6.4 oz (70.9 kg)              Today, you had the following     No orders found for display       Primary Care Provider Office Phone # Fax #    Tim DIO Hill [phone]       [address] [facility] [address]        Equal Access to Services     [facility] : Hadii antonio faustin hadasho Soomaali, waaxda luqadaha, qaybta kaalmada adesaniyayada, mandie early . So [facility] [place] [phone].    ATENCIÓN: Si habla español, tiene a hobson disposición servicios gratuitos de asistencia lingüística. Deirdre al [phone].    We comply with applicable federal civil rights laws and [place] laws. We do not discriminate on the basis of race, color, national origin, age, disability, sex, sexual orientation, or gender identity.            Thank you!     Thank you for choosing [facility] [place]  for your care. Our goal is always to provide you with excellent care. Hearing back from our patients is one way we can continue to improve our services. Please take a few minutes to complete the written survey that you may receive in the mail after your visit with us. Thank you!             Your Updated Medication List - Protect others around you: Learn how to safely use, store and throw away your medicines at www.disposemymeds.org.          This list is accurate as of: 12/8/17 11:59 PM.  Always use your most recent med list.                   Brand Name Dispense Instructions for use Diagnosis    estradiol 0.1 MG/24HR BIW patch    VIVELLE-DOT    24 patch    Place 1 patch " onto the skin twice a week    Symptoms, such as flushing, sleeplessness, headache, lack of concentration, associated with the menopause       METHIMAZOLE PO           OMEGA-3 FISH OIL PO           VITAMIN C PO      Take  by mouth.        VITAMIN D PO      Take  by mouth.

## 2017-12-18 ENCOUNTER — OFFICE VISIT (OUTPATIENT)
Dept: SLEEP MEDICINE | Facility: CLINIC | Age: 47
End: 2017-12-18
Attending: FAMILY MEDICINE
Payer: COMMERCIAL

## 2017-12-18 VITALS
HEART RATE: 76 BPM | HEIGHT: 63 IN | WEIGHT: 165 LBS | BODY MASS INDEX: 29.23 KG/M2 | DIASTOLIC BLOOD PRESSURE: 66 MMHG | OXYGEN SATURATION: 99 % | SYSTOLIC BLOOD PRESSURE: 102 MMHG | RESPIRATION RATE: 14 BRPM

## 2017-12-18 DIAGNOSIS — F51.04 CHRONIC INSOMNIA: Primary | ICD-10-CM

## 2017-12-18 DIAGNOSIS — G47.00 INSOMNIA, UNSPECIFIED TYPE: ICD-10-CM

## 2017-12-18 DIAGNOSIS — R06.83 SNORING: ICD-10-CM

## 2017-12-18 PROCEDURE — 99204 OFFICE O/P NEW MOD 45 MIN: CPT | Performed by: INTERNAL MEDICINE

## 2017-12-18 RX ORDER — TRAZODONE HYDROCHLORIDE 50 MG/1
25-50 TABLET, FILM COATED ORAL
Qty: 30 TABLET | Refills: 1 | Status: SHIPPED | OUTPATIENT
Start: 2017-12-18 | End: 2018-07-11

## 2017-12-18 NOTE — MR AVS SNAPSHOT
After Visit Summary   12/18/2017    Shital Parrish    MRN: [id number]           Patient Information     Date Of Birth          1970        Visit Information        Provider Department      12/18/2017 3:30 PM Yrn Aldrich MD [place] [facility]        Today's Diagnoses     Chronic insomnia    -  1    Insomnia, unspecified type        Snoring          Care Instructions    1. Insomnia     - Trazodone 25 mg (half tablet) as needed for insomnia. You can increase dose to 50 mg if needed   - make the following changes to your sleep wake schedules/   - postpone your bedtime to midnight   - Try to relax in a room other than your bedtime until sleep time   - Try to keep a consistent wake time in the morning which doesnot vary significantly between weekdays and weekends   - Consult Dr. Lobato, behavioral sleep specialist at our clinic   - overnight oximetry as a screening test for sleep apnea  - Follow up in 6 months with me     Your BMI is Body mass index is 29.23 kg/(m^2).  Weight management is a personal decision.  If you are interested in exploring weight loss strategies, the following discussion covers the approaches that may be successful. Body mass index (BMI) is one way to tell whether you are at a healthy weight, overweight, or obese. It measures your weight in relation to your height.  A BMI of 18.5 to 24.9 is in the healthy range. A person with a BMI of 25 to 29.9 is considered overweight, and someone with a BMI of 30 or greater is considered obese. More than two-thirds of American adults are considered overweight or obese.  Being overweight or obese increases the risk for further weight gain. Excess weight may lead to heart disease and diabetes.  Creating and following plans for healthy eating and physical activity may help you improve your health.  Weight control is part of healthy lifestyle and includes exercise, emotional health, and healthy eating habits. Careful eating habits  lifelong are the mainstay of weight control. Though there are significant health benefits from weight loss, long-term weight loss with diet alone may be very difficult to achieve- studies show long-term success with dietary management in less than 10% of people. Attaining a healthy weight may be especially difficult to achieve in those with severe obesity. In some cases, medications, devices and surgical management might be considered.  What can you do?  If you are overweight or obese and are interested in methods for weight loss, you should discuss this with your provider.     Consider reducing daily calorie intake by 500 calories.     Keep a food journal.     Avoiding skipping meals, consider cutting portions instead.    Diet combined with exercise helps maintain muscle while optimizing fat loss. Strength training is particularly important for building and maintaining muscle mass. Exercise helps reduce stress, increase energy, and improves fitness. Increasing exercise without diet control, however, may not burn enough calories to loose weight.       Start walking three days a week 10-20 minutes at a time    Work towards walking thirty minutes five days a week     Eventually, increase the speed of your walking for 1-2 minutes at time    In addition, we recommend that you review healthy lifestyles and methods for weight loss available through the National Institutes of Health patient information sites:  http://win.niddk.nih.gov/publications/index.htm    And look into health and wellness programs that may be available through your health insurance provider, employer, local community center, or tiffany club.    Weight management plan: Patient was referred to their PCP to discuss a diet and exercise plan.            Follow-ups after your visit        Additional Services     SLEEP PSYCHOLOGY REFERRAL       Referral Urgency: Next available    Dr. Fareed Lobato is at the following clinics on the following days:  [facility] - [address] [place], [place]        Thursday and Friday (PLEASE CALL [phone] to schedule an appointment).  [facility] [address] [place]       Wednesdays   (PLEASE CALL [phone] to schedule an appointment).     Please be aware that coverage of these services is subject to the terms and limitations of your health insurance plan. Call member services at your health plan with any benefit or coverage questions.     Please bring the following to your appointment:  >> List of current medications   >> This referral request   >> Any documents/labs given to you for this referral                  Your next 10 appointments already scheduled     Dec 20, 2017  2:00 PM CST   Oximetry  with BED 7  SLEEP   [facility] [place] ([facility] [place])    [address]  [place] [address] [phone]            Dec 21, 2017  2:15 PM CST   Oximetry Drop Off with  SLEEP CENTER DME   [facility] [place] ([facility] [place])    [address]  [place] [address] [phone]            Mar 06, 2018  2:00 PM CST   New Sleep Patient with Fareed Lobato PsyD   [facility] [place] ([facility] [place])    [address] [place] [address] [phone]            Jun 18, 2018  3:00 PM CDT   Return Sleep Patient with Yrn Aldrich MD   [facility] [place] ([facility] [place])    [address]  [facility] [address] [phone]              Future tests that were ordered for you today     Open Future Orders        Priority Expected Expires Ordered    Overnight oximetry study Routine  6/16/2018 12/18/2017            Who to contact     If you have questions or need follow up information about today's clinic visit or your schedule please contact [facility] directly at [phone].  Normal or non-critical lab  "and imaging results will be communicated to you by MyChart, letter or phone within 4 business days after the clinic has received the results. If you do not hear from us within 7 days, please contact the clinic through [employer] or phone. If you have a critical or abnormal lab result, we will notify you by phone as soon as possible.  Submit refill requests through [employer] or call your pharmacy and they will forward the refill request to us. Please allow 3 business days for your refill to be completed.          Additional Information About Your Visit        [employer] Information     [employer] gives you secure access to your electronic health record. If you see a primary care provider, you can also send messages to your care team and make appointments. If you have questions, please call your primary care clinic.  If you do not have a primary care provider, please call [phone] and they will assist you.        Care EveryWhere ID     This is your Care EveryWhere ID. This could be used by other organizations to access your [facility] medical records  [id number]        Your Vitals Were     Pulse Respirations Height Last Period Pulse Oximetry BMI (Body Mass Index)    76 14 1.6 m (5' 3\") 10/26/2014 99% 29.23 kg/m2       Blood Pressure from Last 3 Encounters:   12/18/17 102/66   12/08/17 112/70   11/24/17 108/64    Weight from Last 3 Encounters:   12/18/17 74.8 kg (165 lb)   12/08/17 75.8 kg (167 lb)   11/24/17 73 kg (161 lb)              We Performed the Following     SLEEP EVALUATION & MANAGEMENT REFERRAL - ADULT -[facility] [phone]  (Age 18 and up)     SLEEP PSYCHOLOGY REFERRAL          Today's Medication Changes          These changes are accurate as of: 12/18/17  4:37 PM.  If you have any questions, ask your nurse or doctor.               Start taking these medicines.        Dose/Directions    traZODone 50 MG tablet   Commonly known as:  DESYREL        Dose:  25-50 mg   Take 0.5-1 tablets " (25-50 mg) by mouth nightly as needed for sleep   Quantity:  30 tablet   Refills:  1            Where to get your medicines      These medications were sent to [facility] [address] [facility] [employer] [facility] - [address]  [address] [facility] [address]     Phone:  [phone]     traZODone 50 MG tablet                Primary Care Provider Office Phone # Fax #    Tim Hill [phone]       [address]  [place] [address]        Equal Access to Services     [facility]: Hadii aad ku hadasho Soomaali, waaxda luqadaha, qaybta kaalmada adeegyada, mandie early . So [facility] [phone].    ATENCIÓN: Si habla español, tiene a hobson disposición servicios gratuitos de asistencia lingüística. [facility] [phone].    We comply with applicable federal civil rights laws and [place] laws. We do not discriminate on the basis of race, color, national origin, age, disability, sex, sexual orientation, or gender identity.            Thank you!     Thank you for choosing [facility]  for your care. Our goal is always to provide you with excellent care. Hearing back from our patients is one way we can continue to improve our services. Please take a few minutes to complete the written survey that you may receive in the mail after your visit with us. Thank you!             Your Updated Medication List - Protect others around you: Learn how to safely use, store and throw away your medicines at www.disposemymeds.org.          This list is accurate as of: 12/18/17  4:37 PM.  Always use your most recent med list.                   Brand Name Dispense Instructions for use Diagnosis    estradiol 0.1 MG/24HR BIW patch    VIVELLE-DOT    24 patch    Place 1 patch onto the skin twice a week    Symptoms, such as flushing, sleeplessness, headache, lack of concentration, associated with the menopause       METHIMAZOLE PO           OMEGA-3 FISH OIL PO            traZODone 50 MG tablet    DESYREL    30 tablet    Take 0.5-1 tablets (25-50 mg) by mouth nightly as needed for sleep        VITAMIN C PO      Take  by mouth.        VITAMIN D PO      Take  by mouth.

## 2017-12-18 NOTE — LETTER
12/18/2017        RE: Shital Parrish  [address]  [place] [address]        SLEEP EVALUATION       DATE OF SERVICE:  12/18/2017       REQUESTING PHYSICIAN:  None stated.       CHIEF COMPLAINT:  Difficulty falling asleep and staying asleep.      PRESENTING HISTORY:  Ms. Shital Parrish is a 47-year-old female who presents with a chief complaint of insomnia which started approximately 9 months ago.  Onset of her insomnia was precipitated by uncertainty about her future job position.  She reports that she initially had difficulty initiating sleep which has subsequently evolved into both difficulty initiating and maintaining sleep.  Although she is not under increased stress at this time, her sleep difficulty has continued.      The patient goes to bed at 11:00 p.m.  She finds that her mind is too active at this time and cannot initiate sleep.  She will check her phone, play games or read before being able to sleep close to midnight.  On some nights, it can take her 2-3 hours to fall asleep.  She will continue to lie in bed trying to force sleep at this time.  She wakes up at least 3 times during the night for uncertain reasons.  She has an active mind when she wakes up and has difficulty returning to sleep.  She will check her phone at this time and can lie awake for an hour before getting back to sleep.  She keeps an alarm around 6:00 a.m. and will get up for the morning by 6:30 to 7:00 a.m.  On weekends she goes to bed at 11:00 p.m., with a sleep latency of 1 hour.  She will sleep until 9:00 a.m.  She will get out of bed around 11:00 a.m.      The patient has some tiredness during the daytime but denies any other significant consequence from her insomnia.  She is able to get through her work without any significant impairment.  She does not have excessive sleepiness.  [place] Sleepiness Scale Score is 9/24.  The patient does not take any daytime naps.  She denies having any impairment in her alertness while  driving.      She is reported to have mild snoring.  There is no history of snort arousals, gasping or choking episodes in her sleep.  There is no history of witnessed apneas.      The patient denies having restless legs symptoms.  There is no history of dream enactment behavior or other parasomnias.      The patient experiences a morning headache 4 days in a week.  She denies having a dry mouth.  There is no history of difficulty breathing through her nose.  She has occasional heartburn.  She usually sleeps on her back and her sides.      The patient tried an over-the-counter sleep aid, which was not helpful.  She drinks 1 cup of coffee in the morning.  She drinks alcohol 2 days in a week.      The patient denies having significant depressive symptoms or previous history of depression.  She also denies significant anxiety.      PAST MEDICAL HISTORY:   1.  Hysterectomy.   2.  Hyperthyroidism.      FAMILY HISTORY:  Both parents snored loudly.      SOCIAL HISTORY:  She lives with her .  There is a remote history of smoking.      REVIEW OF SYSTEMS:  A complete review of systems was negative except for weight gain, rapid heartbeats, swelling in her legs, headaches, joint pain.      PHYSICAL EXAMINATION:   CONSTITUTIONAL:  Awake, alert, cooperative, in no apparent distress.   EYES:  No icterus.  Normal conjunctivae, ROMELIA.   ENT:  Oropharynx is Mccollum class IV with narrow diameters.   CARDIOVASCULAR:  Regular S1 and S2.   PULMONARY:  Chest is symmetric.  Lungs clear bilaterally.   NEUROLOGIC:  Alert and oriented x3, no focal neurological deficit.  Cranial nerves are grossly normal exam.   PSYCHIATRIC:  Mood is euthymic with a congruent affect.  Attention and concentration are normal.      IMPRESSION:  Chronic psychophysiologic insomnia.      The patient presents with a characteristic evolution of chronic insomnia.  She has both sleep initiation and maintenance difficulty with daytime tiredness as a consequence.   There is no significant medical or psychiatric comorbidity.      We discussed management of insomnia in detail, both pharmacological therapy and cognitive behavioral therapy approaches.  The patient is interested in cognitive behavioral therapy for insomnia, but wants a p.r.n. hypnotic.  After an informed a discussion, we decided to opt for a trazodone 25-50 mg taken p.r.n.      I have referred her to Dr. Lobato, behavioral sleep specialist at our clinic for further cognitive behavioral therapy.  Meanwhile, I have made recommendations for sleep restriction by postponing her bedtime to midnight and keeping a consistent wake time in the morning.      We discussed risk for sleep apnea.  The patient has mild snoring and has a crowded oropharynx.  BMI is 29.2 and neck circumference is a 35 cm.  Clinical probability for sleep apnea is low.  I have recommended that we perform an overnight oximetry as an additional screening test for sleep apnea.  If the oximetry is normal, no further sleep testing would be indicated.      TREATMENT PLAN:   1.  To start trazodone 25-50 mg p.r.n. for insomnia.   2.  Consultation with Dr. Lobato, behavioral sleep specialist.   3.  Overnight oximetry.   4.  Follow up in 6 months.        I spent a total of 45 minutes with this patient, with more than 50% spent in counseling.         YRN ALBA MD             D: 2017 16:47   T: 2017 07:49   MT: KARI      Name:     BENJI MCALLISTER   MRN:      [id number]        Account:      [id number]   :      1970           Visit Date:   2017      Document: [id number]       cc: Tim Hill MD         Sincerely,        Yrn Alba MD, MD

## 2017-12-18 NOTE — PATIENT INSTRUCTIONS
1. Insomnia     - Trazodone 25 mg (half tablet) as needed for insomnia. You can increase dose to 50 mg if needed   - make the following changes to your sleep wake schedules/   - postpone your bedtime to midnight   - Try to relax in a room other than your bedtime until sleep time   - Try to keep a consistent wake time in the morning which doesnot vary significantly between weekdays and weekends   - Consult Dr. Lobato, behavioral sleep specialist at our clinic   - overnight oximetry as a screening test for sleep apnea  - Follow up in 6 months with me     Your BMI is Body mass index is 29.23 kg/(m^2).  Weight management is a personal decision.  If you are interested in exploring weight loss strategies, the following discussion covers the approaches that may be successful. Body mass index (BMI) is one way to tell whether you are at a healthy weight, overweight, or obese. It measures your weight in relation to your height.  A BMI of 18.5 to 24.9 is in the healthy range. A person with a BMI of 25 to 29.9 is considered overweight, and someone with a BMI of 30 or greater is considered obese. More than two-thirds of American adults are considered overweight or obese.  Being overweight or obese increases the risk for further weight gain. Excess weight may lead to heart disease and diabetes.  Creating and following plans for healthy eating and physical activity may help you improve your health.  Weight control is part of healthy lifestyle and includes exercise, emotional health, and healthy eating habits. Careful eating habits lifelong are the mainstay of weight control. Though there are significant health benefits from weight loss, long-term weight loss with diet alone may be very difficult to achieve- studies show long-term success with dietary management in less than 10% of people. Attaining a healthy weight may be especially difficult to achieve in those with severe obesity. In some cases, medications, devices and  surgical management might be considered.  What can you do?  If you are overweight or obese and are interested in methods for weight loss, you should discuss this with your provider.     Consider reducing daily calorie intake by 500 calories.     Keep a food journal.     Avoiding skipping meals, consider cutting portions instead.    Diet combined with exercise helps maintain muscle while optimizing fat loss. Strength training is particularly important for building and maintaining muscle mass. Exercise helps reduce stress, increase energy, and improves fitness. Increasing exercise without diet control, however, may not burn enough calories to loose weight.       Start walking three days a week 10-20 minutes at a time    Work towards walking thirty minutes five days a week     Eventually, increase the speed of your walking for 1-2 minutes at time    In addition, we recommend that you review healthy lifestyles and methods for weight loss available through the National Institutes of Health patient information sites:  http://win.niddk.nih.gov/publications/index.htm    And look into health and wellness programs that may be available through your health insurance provider, employer, local community center, or tiffany club.    Weight management plan: Patient was referred to their PCP to discuss a diet and exercise plan.

## 2017-12-18 NOTE — NURSING NOTE
"Chief Complaint   Patient presents with     Sleep Problem     consult, \"Tired all day, trouble sleeping.\"       Initial /66  Pulse 76  Resp 14  Ht 1.6 m (5' 3\")  Wt 74.8 kg (165 lb)  LMP 10/26/2014  SpO2 99%  BMI 29.23 kg/m2 Estimated body mass index is 29.23 kg/(m^2) as calculated from the following:    Height as of this encounter: 1.6 m (5' 3\").    Weight as of this encounter: 74.8 kg (165 lb).  Medication Reconciliation: complete     ESS 9  Neck 35cm  Marce Cordova    "

## 2017-12-19 NOTE — PROGRESS NOTES
SLEEP EVALUATION       DATE OF SERVICE:  12/18/2017       REQUESTING PHYSICIAN:  None stated.       CHIEF COMPLAINT:  Difficulty falling asleep and staying asleep.      PRESENTING HISTORY:  Ms. Shital Parrish is a 47-year-old female who presents with a chief complaint of insomnia which started approximately 9 months ago.  Onset of her insomnia was precipitated by uncertainty about her future job position.  She reports that she initially had difficulty initiating sleep which has subsequently evolved into both difficulty initiating and maintaining sleep.  Although she is not under increased stress at this time, her sleep difficulty has continued.      The patient goes to bed at 11:00 p.m.  She finds that her mind is too active at this time and cannot initiate sleep.  She will check her phone, play games or read before being able to sleep close to midnight.  On some nights, it can take her 2-3 hours to fall asleep.  She will continue to lie in bed trying to force sleep at this time.  She wakes up at least 3 times during the night for uncertain reasons.  She has an active mind when she wakes up and has difficulty returning to sleep.  She will check her phone at this time and can lie awake for an hour before getting back to sleep.  She keeps an alarm around 6:00 a.m. and will get up for the morning by 6:30 to 7:00 a.m.  On weekends she goes to bed at 11:00 p.m., with a sleep latency of 1 hour.  She will sleep until 9:00 a.m.  She will get out of bed around 11:00 a.m.      The patient has some tiredness during the daytime but denies any other significant consequence from her insomnia.  She is able to get through her work without any significant impairment.  She does not have excessive sleepiness.  [place] Sleepiness Scale Score is 9/24.  The patient does not take any daytime naps.  She denies having any impairment in her alertness while driving.      She is reported to have mild snoring.  There is no history of snort  arousals, gasping or choking episodes in her sleep.  There is no history of witnessed apneas.      The patient denies having restless legs symptoms.  There is no history of dream enactment behavior or other parasomnias.      The patient experiences a morning headache 4 days in a week.  She denies having a dry mouth.  There is no history of difficulty breathing through her nose.  She has occasional heartburn.  She usually sleeps on her back and her sides.      The patient tried an over-the-counter sleep aid, which was not helpful.  She drinks 1 cup of coffee in the morning.  She drinks alcohol 2 days in a week.      The patient denies having significant depressive symptoms or previous history of depression.  She also denies significant anxiety.      PAST MEDICAL HISTORY:   1.  Hysterectomy.   2.  Hyperthyroidism.      FAMILY HISTORY:  Both parents snored loudly.      SOCIAL HISTORY:  She lives with her .  There is a remote history of smoking.      REVIEW OF SYSTEMS:  A complete review of systems was negative except for weight gain, rapid heartbeats, swelling in her legs, headaches, joint pain.      PHYSICAL EXAMINATION:   CONSTITUTIONAL:  Awake, alert, cooperative, in no apparent distress.   EYES:  No icterus.  Normal conjunctivae, ROMELIA.   ENT:  Oropharynx is Mccollum class IV with narrow diameters.   CARDIOVASCULAR:  Regular S1 and S2.   PULMONARY:  Chest is symmetric.  Lungs clear bilaterally.   NEUROLOGIC:  Alert and oriented x3, no focal neurological deficit.  Cranial nerves are grossly normal exam.   PSYCHIATRIC:  Mood is euthymic with a congruent affect.  Attention and concentration are normal.      IMPRESSION:  Chronic psychophysiologic insomnia.      The patient presents with a characteristic evolution of chronic insomnia.  She has both sleep initiation and maintenance difficulty with daytime tiredness as a consequence.  There is no significant medical or psychiatric comorbidity.      We discussed  management of insomnia in detail, both pharmacological therapy and cognitive behavioral therapy approaches.  The patient is interested in cognitive behavioral therapy for insomnia, but wants a p.r.n. hypnotic.  After an informed a discussion, we decided to opt for a trazodone 25-50 mg taken p.r.n.      I have referred her to Dr. Lobato, behavioral sleep specialist at our clinic for further cognitive behavioral therapy.  Meanwhile, I have made recommendations for sleep restriction by postponing her bedtime to midnight and keeping a consistent wake time in the morning.      We discussed risk for sleep apnea.  The patient has mild snoring and has a crowded oropharynx.  BMI is 29.2 and neck circumference is a 35 cm.  Clinical probability for sleep apnea is low.  I have recommended that we perform an overnight oximetry as an additional screening test for sleep apnea.  If the oximetry is normal, no further sleep testing would be indicated.      TREATMENT PLAN:   1.  To start trazodone 25-50 mg p.r.n. for insomnia.   2.  Consultation with Dr. Lobato, behavioral sleep specialist.   3.  Overnight oximetry.   4.  Follow up in 6 months.        I spent a total of 45 minutes with this patient, with more than 50% spent in counseling.         SHARON ALBA MD             D: 2017 16:47   T: 2017 07:49   MT: KARI      Name:     BENJI MCALLISTER   MRN:      [id number]        Account:      [id number]   :      1970           Visit Date:   2017      Document: [id number]       cc: Tim Hill MD

## 2017-12-20 ENCOUNTER — OFFICE VISIT (OUTPATIENT)
Dept: SLEEP MEDICINE | Facility: CLINIC | Age: 47
End: 2017-12-20
Payer: COMMERCIAL

## 2017-12-20 DIAGNOSIS — R06.83 SNORING: ICD-10-CM

## 2017-12-20 NOTE — MR AVS SNAPSHOT
After Visit Summary   12/20/2017    Shital Parrish    MRN: [id number]           Patient Information     Date Of Birth          1970        Visit Information        Provider Department      12/20/2017 2:00 PM BED 7 SH SLEEP [facility]        Today's Diagnoses     Snoring           Follow-ups after your visit        Your next 10 appointments already scheduled     Dec 21, 2017  2:15 PM CST   Oximetry Drop Off with  SLEEP CENTER DME   [facility] ([facility])    [address]  [place] [address] [phone]            Mar 06, 2018  2:00 PM CST   New Sleep Patient with Fareed Lobato PsyD   [facility] ([facility])    [address]  [place] [id number] [phone]            Jun 18, 2018  3:00 PM CDT   Return Sleep Patient with Yrn Aldrich MD   [facility] ([facility])    [address]  [place] [id number] [phone]              Who to contact     If you have questions or need follow up information about today's clinic visit or your schedule please contact [facility] directly at [phone].  Normal or non-critical lab and imaging results will be communicated to you by [employer], letter or phone within 4 business days after the clinic has received the results. If you do not hear from us within 7 days, please contact the clinic through [employer] or phone. If you have a critical or abnormal lab result, we will notify you by phone as soon as possible.  Submit refill requests through [employer] or call your pharmacy and they will forward the refill request to us. Please allow 3 business days for your refill to be completed.          Additional Information About Your Visit        [employer] Information     [employer] gives you secure access to your electronic health record. If you see a primary care provider,  you can also send messages to your care team and make appointments. If you have questions, please call your primary care clinic.  If you do not have a primary care provider, please call [phone] and they will assist you.        Care EveryWhere ID     This is your Care EveryWhere ID. This could be used by other organizations to access your [facility] medical records  [id number]        Your Vitals Were     Last Period                   10/26/2014            Blood Pressure from Last 3 Encounters:   12/18/17 102/66   12/08/17 112/70   11/24/17 108/64    Weight from Last 3 Encounters:   12/18/17 74.8 kg (165 lb)   12/08/17 75.8 kg (167 lb)   11/24/17 73 kg (161 lb)              We Performed the Following     Overnight oximetry study        Primary Care Provider Office Phone # Fax #    Tim Hill [phone]       [address] [address]        Equal Access to Services     BRENNAN PALMA : Hadii aad ku hadasho Soomaali, waaxda luqadaha, qaybta kaalmada adeegyada, waxay idiin hayaan reyeseg martinearachaya la'stephanie . So [facility] [phone].    ATENCIÓN: Si habla español, tiene a hobson disposición servicios gratuitos de asistencia lingüística. Llame al [phone].    We comply with applicable federal civil rights laws and [place] laws. We do not discriminate on the basis of race, color, national origin, age, disability, sex, sexual orientation, or gender identity.            Thank you!     Thank you for choosing [facility]  for your care. Our goal is always to provide you with excellent care. Hearing back from our patients is one way we can continue to improve our services. Please take a few minutes to complete the written survey that you may receive in the mail after your visit with us. Thank you!             Your Updated Medication List - Protect others around you: Learn how to safely use, store and throw away your medicines at www.disposemymeds.org.          This list is  accurate as of: 12/20/17  3:59 PM.  Always use your most recent med list.                   Brand Name Dispense Instructions for use Diagnosis    estradiol 0.1 MG/24HR BIW patch    VIVELLE-DOT    24 patch    Place 1 patch onto the skin twice a week    Symptoms, such as flushing, sleeplessness, headache, lack of concentration, associated with the menopause       METHIMAZOLE PO           OMEGA-3 FISH OIL PO           traZODone 50 MG tablet    DESYREL    30 tablet    Take 0.5-1 tablets (25-50 mg) by mouth nightly as needed for sleep        VITAMIN C PO      Take  by mouth.        VITAMIN D PO      Take  by mouth.

## 2017-12-20 NOTE — PROGRESS NOTES
Patient instructed on CHRISTINE use. Patient demonstrated and verbalized knowledge of use. Insurance was verified by financial securing. Device programmed. Device will be returned tomorrow before noon.      Kimmy GutiérrezNash  Sleep Clinic - Specialist

## 2017-12-21 ENCOUNTER — DOCUMENTATION ONLY (OUTPATIENT)
Dept: SLEEP MEDICINE | Facility: CLINIC | Age: 47
End: 2017-12-21
Payer: COMMERCIAL

## 2017-12-21 NOTE — NURSING NOTE
Overnight oximetry completed by patient.     Recording date: 12/20/2017    Duration: 07:39:56    Note: Download successful. Copy given to provider and scanned in to chart.      Kimmy Nash  Sleep Clinic - Specialist

## 2017-12-21 NOTE — PROCEDURES
PHYSICIAN INTERPRETATION   HOME OXIMETRY   Patient: Shital Parrish  MRN: [id number]  YOB: 1970  Study Date: 12/20/2017   Referring Physician: Tim Hill  Ordering Physician: Yrn Aldrich MD, MD     Conditions: Room air  Quality: artifact-free    Measure [threshold]                 Time less than or equal to SpO2 88% [5 min]: 0.1 min  Duration monitoring [> 2 hours artifact free]:  07:39 hours                    4% O2 desat index [ > 15/ hour]:    3.4/ hour                    Pattern:       normal                                  Assessment:   Normal study    Recommendations:    Normal oximetry indicating that clinically significant sleep apnea is unlikely.     Diagnosis Code(s):  Snoring   Yrn Aldrich MD, MD, December 21, 2017

## 2018-03-13 ENCOUNTER — TELEPHONE (OUTPATIENT)
Dept: SLEEP MEDICINE | Facility: CLINIC | Age: 48
End: 2018-03-13

## 2018-03-13 NOTE — TELEPHONE ENCOUNTER
Dr. Lobato will be out of the office in a meeting on 06/05/18. I called the patient to let her know we have to reschedule her consult appointment with Dr. Lobato.  Dr. Aldrich referred patient to Dr. Lobato.    Juliane Mosley

## 2018-06-26 ENCOUNTER — TELEPHONE (OUTPATIENT)
Dept: NURSING | Facility: CLINIC | Age: 48
End: 2018-06-26

## 2018-07-02 ENCOUNTER — TRANSFERRED RECORDS (OUTPATIENT)
Dept: HEALTH INFORMATION MANAGEMENT | Facility: CLINIC | Age: 48
End: 2018-07-02

## 2018-07-02 LAB
GLUCOSE SERPL-MCNC: 89 MG/DL (ref 65–99)
HBA1C MFR BLD: 5.1 % (ref 4–6)
TSH SERPL-ACNC: 1.88 UIU/ML (ref 0.3–5)

## 2018-07-03 ENCOUNTER — TRANSFERRED RECORDS (OUTPATIENT)
Dept: HEALTH INFORMATION MANAGEMENT | Facility: CLINIC | Age: 48
End: 2018-07-03

## 2018-07-11 ENCOUNTER — OFFICE VISIT (OUTPATIENT)
Dept: OBGYN | Facility: CLINIC | Age: 48
End: 2018-07-11
Payer: COMMERCIAL

## 2018-07-11 VITALS
HEIGHT: 64 IN | BODY MASS INDEX: 26.12 KG/M2 | HEART RATE: 70 BPM | SYSTOLIC BLOOD PRESSURE: 116 MMHG | WEIGHT: 153 LBS | DIASTOLIC BLOOD PRESSURE: 74 MMHG

## 2018-07-11 DIAGNOSIS — F51.01 PRIMARY INSOMNIA: ICD-10-CM

## 2018-07-11 DIAGNOSIS — E05.90 HYPERTHYROIDISM: ICD-10-CM

## 2018-07-11 DIAGNOSIS — Z13.820 SCREENING FOR OSTEOPOROSIS: ICD-10-CM

## 2018-07-11 DIAGNOSIS — Z01.419 ENCOUNTER FOR GYNECOLOGICAL EXAMINATION WITHOUT ABNORMAL FINDING: Primary | ICD-10-CM

## 2018-07-11 DIAGNOSIS — N95.1 SYMPTOMS, SUCH AS FLUSHING, SLEEPLESSNESS, HEADACHE, LACK OF CONCENTRATION, ASSOCIATED WITH THE MENOPAUSE: ICD-10-CM

## 2018-07-11 PROCEDURE — 99396 PREV VISIT EST AGE 40-64: CPT | Performed by: OBSTETRICS & GYNECOLOGY

## 2018-07-11 ASSESSMENT — ANXIETY QUESTIONNAIRES
IF YOU CHECKED OFF ANY PROBLEMS ON THIS QUESTIONNAIRE, HOW DIFFICULT HAVE THESE PROBLEMS MADE IT FOR YOU TO DO YOUR WORK, TAKE CARE OF THINGS AT HOME, OR GET ALONG WITH OTHER PEOPLE: NOT DIFFICULT AT ALL
2. NOT BEING ABLE TO STOP OR CONTROL WORRYING: NOT AT ALL
3. WORRYING TOO MUCH ABOUT DIFFERENT THINGS: NOT AT ALL
7. FEELING AFRAID AS IF SOMETHING AWFUL MIGHT HAPPEN: NOT AT ALL
GAD7 TOTAL SCORE: 0
1. FEELING NERVOUS, ANXIOUS, OR ON EDGE: NOT AT ALL
5. BEING SO RESTLESS THAT IT IS HARD TO SIT STILL: NOT AT ALL
6. BECOMING EASILY ANNOYED OR IRRITABLE: NOT AT ALL

## 2018-07-11 ASSESSMENT — PATIENT HEALTH QUESTIONNAIRE - PHQ9: 5. POOR APPETITE OR OVEREATING: NOT AT ALL

## 2018-07-11 NOTE — MR AVS SNAPSHOT
"              After Visit Summary   7/11/2018    Shital Parrish    MRN: [id number]           Patient Information     Date Of Birth          1970        Visit Information        Provider Department      7/11/2018 10:30 AM Shauna Arceo MD [facility] [place]        Today's Diagnoses     Encounter for gynecological examination without abnormal finding    -  1    Symptoms, such as flushing, sleeplessness, headache, lack of concentration, associated with the menopause        Primary insomnia        Hyperthyroidism        Screening for osteoporosis           Follow-ups after your visit        Your next 10 appointments already scheduled     Jul 16, 2018  1:15 PM CDT   MA SCREENING DIGITAL BILATERAL with WEMA1   [facility] [place] ([facility] [place])    [address], [address]  [place] [address] [phone]           Do not use any powder, lotion or deodorant under your arms or on your breast. If you do, we will ask you to remove it before your exam.  Wear comfortable, two-piece clothing.  If you have any allergies, tell your care team.  Bring any previous mammograms from other facilities or have them mailed to the breast center. Three-dimensional (3D) mammograms are available at [place] locations in [facility], [place], [facility], [place], [place], and [place]. [facility] locations include [place] and [facility] in [place]. Benefits of 3D mammograms include: - Improved rate of cancer detection - Decreases your chance of having to go back for more tests, which means fewer: - \"False-positive\" results (This means that there is an abnormal area but it isn't cancer.) - Invasive testing procedures, such as a biopsy or surgery - Can provide clearer images of the breast if you have dense breast tissue. 3D mammography is an optional exam that anyone can have with a 2D mammogram. It doesn't replace or take the place of a 2D " mammogram. 2D mammograms remain an effective screening test for all women.  Not all insurance companies cover the cost of a 3D mammogram. Check with your insurance.            Aug 03, 2018  1:30 PM CDT   Return Sleep Patient with Yrn Aldrich MD   [facility] [place] ([facility] [place])    [address] [address]  [place] [address]   [phone]              Future tests that were ordered for you today     Open Future Orders        Priority Expected Expires Ordered    DX Hip/Pelvis/Spine Routine  7/12/2019 7/12/2018            Who to contact     If you have questions or need follow up information about today's clinic visit or your schedule please contact [facility] [place] directly at [phone].  Normal or non-critical lab and imaging results will be communicated to you by iSkoothart, letter or phone within 4 business days after the clinic has received the results. If you do not hear from us within 7 days, please contact the clinic through iSkoothart or phone. If you have a critical or abnormal lab result, we will notify you by phone as soon as possible.  Submit refill requests through Nuhook or call your pharmacy and they will forward the refill request to us. Please allow 3 business days for your refill to be completed.          Additional Information About Your Visit        Nuhook Information     Nuhook gives you secure access to your electronic health record. If you see a primary care provider, you can also send messages to your care team and make appointments. If you have questions, please call your primary care clinic.  If you do not have a primary care provider, please call [phone] and they will assist you.        Care EveryWhere ID     This is your Care EveryWhere ID. This could be used by other organizations to access your [facility] medical records  [id number]        Your Vitals Were     Pulse Height Last Period Breastfeeding? BMI (Body Mass Index)     "   70 5' 4\" (1.626 m) 10/26/2014 No 26.26 kg/m2        Blood Pressure from Last 3 Encounters:   07/11/18 116/74   12/18/17 102/66   12/08/17 112/70    Weight from Last 3 Encounters:   07/11/18 153 lb (69.4 kg)   12/18/17 165 lb (74.8 kg)   12/08/17 167 lb (75.8 kg)                 Today's Medication Changes          These changes are accurate as of 7/11/18 11:59 PM.  If you have any questions, ask your nurse or doctor.               Start taking these medicines.        Dose/Directions    amitriptyline 25 MG tablet   Commonly known as:  ELAVIL   Used for:  Primary insomnia   Started by:  Shauna Arceo MD        Dose:  25 mg   Take 1 tablet (25 mg) by mouth At Bedtime   Quantity:  90 tablet   Refills:  1            Where to get your medicines      These medications were sent to [place] [address] [place] [facility] [address]  [address] [facility] [address]     Phone:  [phone]     amitriptyline 25 MG tablet    estradiol 0.1 MG/24HR BIW patch                Primary Care Provider Office Phone # Fax #    Tim Hill [phone] [address] [address]        Equal Access to Services     [facility]: Hadii antonio faustin hadasho Sodebora, waaxda luqadaha, qaybta kaalmada adeegyada, mandie chaudhary. So [facility] [phone].    ATENCIÓN: Si habla español, tiene a hobson disposición servicios gratuitos de asistencia lingüística. Llosmin al [phone].    We comply with applicable federal civil rights laws and [place] laws. We do not discriminate on the basis of race, color, national origin, age, disability, sex, sexual orientation, or gender identity.            Thank you!     Thank you for choosing [facility] [place]  for your care. Our goal is always to provide you with excellent care. Hearing back from our patients is one way we can continue to improve our services. Please take a few minutes to complete the " written survey that you may receive in the mail after your visit with us. Thank you!             Your Updated Medication List - Protect others around you: Learn how to safely use, store and throw away your medicines at www.disposemymeds.org.          This list is accurate as of 7/11/18 11:59 PM.  Always use your most recent med list.                   Brand Name Dispense Instructions for use Diagnosis    amitriptyline 25 MG tablet    ELAVIL    90 tablet    Take 1 tablet (25 mg) by mouth At Bedtime    Primary insomnia       estradiol 0.1 MG/24HR BIW patch    VIVELLE-DOT    24 patch    Place 1 patch onto the skin twice a week    Symptoms, such as flushing, sleeplessness, headache, lack of concentration, associated with the menopause       METHIMAZOLE PO           OMEGA-3 FISH OIL PO           VITAMIN C PO      Take  by mouth.        VITAMIN D PO      Take  by mouth.

## 2018-07-11 NOTE — PROGRESS NOTES
Shital is a 47 year old  female who presents for annual exam.     Besides routine health maintenance, she has no other health concerns today .    HPI:  The patient's PCP is Tim Hill MD.    Patient is doing really well with her ERT. All of her vasomotor sx have resolved and very happy with it. Given her hyst we don't know exactly when menopause is/was but has had sx for at least a couple of years already so at least perimenopausal and likely fully menopausal    Was hoping ERT would help her insomnia.definitely not as bad as it was before ERT but still has some bad nights. When last here was just about to do a sleep study for both her and her  b/c he snores so much and that is part of what is interfering in her sleep. They didn't find anythign wrong with either of them. Recommended a mouth appliance to her  that would be 1k out of pocket cost and not even a gauranteed help so haven't done it. She was rx'd trazodone and used one pill and slept worse with it and then had grogginess the next day    Has been working on diet and exercise. Down 10# and definitely happy with that though feels that with her daily exercise and calorie intake the loss should be faster. Walking every day for an hour and really limiting sweets and carbs and overall calories    Had a mammo scheduled for today but there was a long delay and has to leave so will try to reschedule it for Monday when brings kendrick in for her annual    Seeing endo for her thyroid and her tfts are normal so just slightly cut back on her methimazole dose and feeling well      GYNECOLOGIC HISTORY:    Patient's last menstrual period was 10/26/2014.  Her current contraception method is: hysterectomy.  She  reports that she has never smoked. She has never used smokeless tobacco.    Patient is sexually active.  STD testing offered?  Declined  Last PHQ-9 score on record =   PHQ-9 SCORE 2018   Total Score 0     Last GAD7 score on record =   RACHEL-7  SCORE 2018   Total Score 0     Alcohol Score = 2    HEALTH MAINTENANCE:  Cholesterol:   Cholesterol   Date Value Ref Range Status   2017 215 (H) <200 mg/dL Final     Comment:     Desirable:       <200 mg/dl   2016 199 <200 mg/dL Final    17  Total= 215, Triglycerides=82, HDL=65, YBZ=754, FBS=81  Last Mammo: one year ago, Result: normal, Next Mammo: today   Pap:   Lab Results   Component Value Date    PAP NIL 10/23/2012    9/17/14 WNL HPV (-)neg  Colonoscopy:  17, Result: normal, Next Colonoscopy: 4 years.  Dexa:  NA    Health maintenance updated:  yes    HISTORY:  Obstetric History       T2      L2     SAB2   TAB2   Ectopic0   Multiple0   Live Births2       # Outcome Date GA Lbr Rajesh/2nd Weight Sex Delivery Anes PTL Lv   6 Term 10/01/98    F CS-LTranv   [place]      Name: Myrto   5 Term 96    F CS-LTranv   [place]      Name: Nefeli   4 TAB            3 TAB            2 SAB            1 SAB                   Patient Active Problem List   Diagnosis     CARDIOVASCULAR SCREENING; LDL GOAL LESS THAN 160     Hyperthyroidism     S/P abdominal supracervical subtotal hysterectomy     Elevated fasting glucose     Past Surgical History:   Procedure Laterality Date     APPENDECTOMY        SECTION       CHOLECYSTECTOMY, LAPOROSCOPIC  2012    Cholecystectomy, Laparoscopic     COLONOSCOPY N/A 2017    Procedure: COLONOSCOPY;  COLONOSCOPY;  Surgeon: Shannon Noel MD;  Location:  GI     HYSTERECTOMY SUPRACERVICAL N/A 2014    Procedure: HYSTERECTOMY SUPRACERVICAL;  Surgeon: Shauna Arceo MD;  Location:  OR [facility] done by Adis/ Leora. started as laparoscopic but converted to open due to adhesions     LAPAROSCOPIC CYSTECTOMY OVARIAN (ONCOLOGY)      right. benign cyst. partial oopherectomy in [facility]     LAPAROSCOPIC SALPINGO-OOPHORECTOMY  14     MYOMECTOMY UTERUS  2008    hysteroscopic     TONSILLECTOMY        Social History   Substance  "Use Topics     Smoking status: Never Smoker     Smokeless tobacco: Never Used     Alcohol use No      Problem (# of Occurrences) Relation (Name,Age of Onset)    Breast Cancer (1) Maternal Aunt    Cancer (1) Mother: melanoma    Colon Polyps (1) Mother    Diabetes (2) Mother, Maternal Grandmother    Thyroid Disease (1) Cousin       Negative family history of: Asthma            Current Outpatient Prescriptions   Medication Sig     amitriptyline (ELAVIL) 25 MG tablet Take 1 tablet (25 mg) by mouth At Bedtime     Ascorbic Acid (VITAMIN C PO) Take  by mouth.     Cholecalciferol (VITAMIN D PO) Take  by mouth.     estradiol (VIVELLE-DOT) 0.1 MG/24HR BIW patch Place 1 patch onto the skin twice a week     METHIMAZOLE PO      Omega-3 Fatty Acids (OMEGA-3 FISH OIL PO)      No current facility-administered medications for this visit.      Allergies   Allergen Reactions     Penicillins      Amoxicillin Rash       Past medical, surgical, social and family histories were reviewed and updated in EPIC.    ROS:   12 point review of systems negative other than symptoms noted below.    EXAM:  /74  Pulse 70  Ht 5' 4\" (1.626 m)  Wt 153 lb (69.4 kg)  LMP 10/26/2014  Breastfeeding? No  BMI 26.26 kg/m2   BMI: Body mass index is 26.26 kg/(m^2).    PHYSICAL EXAM:  Constitutional:  Appearance: Well nourished, well developed, alert, in no acute distress  Neck:  Lymph Nodes:  No lymphadenopathy present    Thyroid:  Gland size normal, nontender, no nodules or masses present  on palpation  Chest:  Respiratory Effort:  Breathing unlabored  Cardiovascular:    Heart: Auscultation:  Regular rate, normal rhythm, no murmurs present  Breasts: Palpation of Breasts and Axillae:  No masses present on palpation, no breast tenderness. and No nodularity, asymmetry or nipple discharge bilaterally.  Gastrointestinal:   Abdominal Examination:  Abdomen nontender to palpation, tone normal without rigidity or guarding, no masses present, umbilicus without " lesions   Liver and Spleen:  No hepatomegaly present, liver nontender to palpation    Hernias:  No hernias present  Lymphatic: Lymph Nodes:  No other lymphadenopathy present  Skin:  General Inspection:  No rashes present, no lesions present, no areas of  discoloration    Genitalia and Groin:  No rashes present, no lesions present, no areas of  discoloration, no masses present  Neurologic/Psychiatric:    Mental Status:  Oriented X3     Pelvic Exam:  External Genitalia:     Normal appearance for age, no discharge present, no tenderness present, no inflammatory lesions present, color normal  Vagina:     Normal vaginal vault without central or paravaginal defects, no discharge present, no inflammatory lesions present, no masses present  Bladder:     Nontender to palpation  Urethra:   Urethral Body:  Urethra palpation normal, urethra structural support normal   Urethral Meatus:  No erythema or lesions present  Cervix:     Appearance healthy, no lesions present, nontender to palpation, no bleeding present  Uterus:     Surgically absent  Adnexa:     No adnexal tenderness present, no adnexal masses present  Perineum:     Perineum within normal limits, no evidence of trauma, no rashes or skin lesions present  Anus:     Anus within normal limits, no hemorrhoids present  Inguinal Lymph Nodes:     No lymphadenopathy present  Pubic Hair:     Normal pubic hair distribution for age  Genitalia and Groin:     No rashes present, no lesions present, no areas of discoloration, no masses present      COUNSELING:   Reviewed preventive health counseling, as reflected in patient instructions  Special attention given to:        Regular exercise       Healthy diet/nutrition       (Esther)menopause management    BMI: Body mass index is 26.26 kg/(m^2).  Weight management plan: Discussed healthy diet and exercise guidelines and patient will follow up in 12 months in clinic to re-evaluate.    ASSESSMENT:  47 year old female with satisfactory  annual exam.    ICD-10-CM    1. Encounter for gynecological examination without abnormal finding Z01.419    2. Symptoms, such as flushing, sleeplessness, headache, lack of concentration, associated with the menopause N95.1 estradiol (VIVELLE-DOT) 0.1 MG/24HR BIW patch   3. Primary insomnia F51.01 amitriptyline (ELAVIL) 25 MG tablet   4. Hyperthyroidism E05.90    5. Screening for osteoporosis Z13.820 DX Hip/Pelvis/Spine       PLAN:  Pap is UTD until next year  Will plan dexa and fasting labs again next year  rediscussed ERT vs HRT and the WHI and will continue on her current ERT regimen since it's working so well    Discussed her insomnia and safe nonaddictive meds vs addictive potential meds. Would like to try something just to have on hand every now and then. Will try elavil since trazodone didn't work. Can titrate up by 25 mg every 3-4 days until gets to the dose that works without side effects. If not adequate or has s.e would then consider prn ambien or restoril but those have tolerance/addiction potential.    Shauna Arceo MD

## 2018-07-12 RX ORDER — ESTRADIOL 0.1 MG/D
1 FILM, EXTENDED RELEASE TRANSDERMAL
Qty: 24 PATCH | Refills: 3 | Status: SHIPPED | OUTPATIENT
Start: 2018-07-12 | End: 2019-07-15

## 2018-07-12 ASSESSMENT — ANXIETY QUESTIONNAIRES: GAD7 TOTAL SCORE: 0

## 2018-07-12 ASSESSMENT — PATIENT HEALTH QUESTIONNAIRE - PHQ9: SUM OF ALL RESPONSES TO PHQ QUESTIONS 1-9: 0

## 2018-07-16 ENCOUNTER — RADIANT APPOINTMENT (OUTPATIENT)
Dept: MAMMOGRAPHY | Facility: CLINIC | Age: 48
End: 2018-07-16
Attending: OBSTETRICS & GYNECOLOGY
Payer: COMMERCIAL

## 2018-07-16 PROCEDURE — 77067 SCR MAMMO BI INCL CAD: CPT | Mod: TC

## 2018-07-16 PROCEDURE — 77063 BREAST TOMOSYNTHESIS BI: CPT | Mod: TC

## 2018-07-18 ENCOUNTER — TRANSFERRED RECORDS (OUTPATIENT)
Dept: HEALTH INFORMATION MANAGEMENT | Facility: CLINIC | Age: 48
End: 2018-07-18

## 2018-07-25 ENCOUNTER — TRANSFERRED RECORDS (OUTPATIENT)
Dept: HEALTH INFORMATION MANAGEMENT | Facility: CLINIC | Age: 48
End: 2018-07-25

## 2018-11-09 ENCOUNTER — OFFICE VISIT (OUTPATIENT)
Dept: FAMILY MEDICINE | Facility: CLINIC | Age: 48
End: 2018-11-09
Payer: COMMERCIAL

## 2018-11-09 VITALS
OXYGEN SATURATION: 98 % | HEART RATE: 84 BPM | WEIGHT: 153 LBS | SYSTOLIC BLOOD PRESSURE: 115 MMHG | TEMPERATURE: 98.4 F | BODY MASS INDEX: 26.12 KG/M2 | RESPIRATION RATE: 16 BRPM | DIASTOLIC BLOOD PRESSURE: 83 MMHG | HEIGHT: 64 IN

## 2018-11-09 DIAGNOSIS — Z00.00 HEALTHCARE MAINTENANCE: Primary | ICD-10-CM

## 2018-11-09 DIAGNOSIS — E05.90 HYPERTHYROIDISM: ICD-10-CM

## 2018-11-09 LAB
ERYTHROCYTE [DISTWIDTH] IN BLOOD BY AUTOMATED COUNT: 12.1 % (ref 10–15)
HCT VFR BLD AUTO: 39.2 % (ref 35–47)
HGB BLD-MCNC: 13.3 G/DL (ref 11.7–15.7)
MCH RBC QN AUTO: 31.2 PG (ref 26.5–33)
MCHC RBC AUTO-ENTMCNC: 33.9 G/DL (ref 31.5–36.5)
MCV RBC AUTO: 92 FL (ref 78–100)
PLATELET # BLD AUTO: 304 10E9/L (ref 150–450)
RBC # BLD AUTO: 4.26 10E12/L (ref 3.8–5.2)
T3FREE SERPL-MCNC: 3 PG/ML (ref 2.3–4.2)
WBC # BLD AUTO: 6 10E9/L (ref 4–11)

## 2018-11-09 PROCEDURE — 84443 ASSAY THYROID STIM HORMONE: CPT | Performed by: FAMILY MEDICINE

## 2018-11-09 PROCEDURE — 85027 COMPLETE CBC AUTOMATED: CPT | Performed by: FAMILY MEDICINE

## 2018-11-09 PROCEDURE — 84439 ASSAY OF FREE THYROXINE: CPT | Performed by: FAMILY MEDICINE

## 2018-11-09 PROCEDURE — 36415 COLL VENOUS BLD VENIPUNCTURE: CPT | Performed by: FAMILY MEDICINE

## 2018-11-09 PROCEDURE — 84481 FREE ASSAY (FT-3): CPT | Performed by: FAMILY MEDICINE

## 2018-11-09 PROCEDURE — 80053 COMPREHEN METABOLIC PANEL: CPT | Performed by: FAMILY MEDICINE

## 2018-11-09 PROCEDURE — 80061 LIPID PANEL: CPT | Performed by: FAMILY MEDICINE

## 2018-11-09 PROCEDURE — 99396 PREV VISIT EST AGE 40-64: CPT | Performed by: FAMILY MEDICINE

## 2018-11-09 NOTE — MR AVS SNAPSHOT
After Visit Summary   11/9/2018    Shital Parrish    MRN: [id number]           Patient Information     Date Of Birth          1970        Visit Information        Provider Department      11/9/2018 11:00 AM Tim Hill MD [facility]        Today's [facility] maintenance    -  1      Care Instructions      Preventive Health Recommendations  Female Ages 40 to 49    Yearly exam:     See your health care provider every year in order to  1. Review health changes.   2. Discuss preventive care.    3. Review your medicines if your doctor prescribed any.      Get a Pap test every three years (unless you have an abnormal result and your provider advises testing more often).      If you get Pap tests with HPV test, you only need to test every 5 years, unless you have an abnormal result. You do not need a Pap test if your uterus was removed (hysterectomy) and you have not had cancer.      You should be tested each year for STDs (sexually transmitted diseases), if you're at risk.     Ask your doctor if you should have a mammogram.      Have a colonoscopy (test for colon cancer) if someone in your family has had colon cancer or polyps before age 50.       Have a cholesterol test every 5 years.       Have a diabetes test (fasting glucose) after age 45. If you are at risk for diabetes, you should have this test every 3 years.    Shots: Get a flu shot each year. Get a tetanus shot every 10 years.     Nutrition:     Eat at least 5 servings of fruits and vegetables each day.    Eat whole-grain bread, whole-wheat pasta and brown rice instead of white grains and rice.    Get adequate Calcium and Vitamin D.      Lifestyle    Exercise at least 150 minutes a week (an average of 30 minutes a day, 5 days a week). This will help you control your weight and prevent disease.    Limit alcohol to one drink per day.    No smoking.     Wear sunscreen to prevent skin cancer.    See your dentist  "every six months for an exam and cleaning.          Follow-ups after your visit        Follow-up notes from your care team     Return in about 1 year (around 11/9/2019) for Physical Exam.      Your next 10 appointments already scheduled     Dec 28, 2018  3:00 PM CST   Return Sleep Patient with Yrn Aldrich MD   [facility] [place] ([facility] [place])    [address] [address] [phone]              Who to contact     If you have questions or need follow up information about today's clinic visit or your schedule please contact [facility] [place] directly at [phone].  Normal or non-critical lab and imaging results will be communicated to you by MyChart, letter or phone within 4 business days after the clinic has received the results. If you do not hear from us within 7 days, please contact the clinic through FantasySalesTeamhart or phone. If you have a critical or abnormal lab result, we will notify you by phone as soon as possible.  Submit refill requests through TouchPal or call your pharmacy and they will forward the refill request to us. Please allow 3 business days for your refill to be completed.          Additional Information About Your Visit        FantasySalesTeamhart Information     TouchPal gives you secure access to your electronic health record. If you see a primary care provider, you can also send messages to your care team and make appointments. If you have questions, please call your primary care clinic.  If you do not have a primary care provider, please call [phone] and they will assist you.        Care EveryWhere ID     This is your Care EveryWhere ID. This could be used by other organizations to access your [facility] medical records  [id number]        Your Vitals Were     Pulse Temperature Respirations Height Last Period Pulse Oximetry    84 98.4  F (36.9  C) (Tympanic) 16 5' 4\" (1.626 m) 10/26/2014 98%    BMI (Body Mass Index)                "    26.26 kg/m2            Blood Pressure from Last 3 Encounters:   11/09/18 115/83   07/11/18 116/74   12/18/17 102/66    Weight from Last 3 Encounters:   11/09/18 153 lb (69.4 kg)   07/11/18 153 lb (69.4 kg)   12/18/17 165 lb (74.8 kg)              We Performed the Following     CBC with platelets     Comprehensive metabolic panel (BMP + Alb, Alk Phos, ALT, AST, Total. Bili, TP)     Lipid panel reflex to direct LDL Fasting        Primary Care Provider Office Phone # Fax #    Tim DIO Hill [phone]       [address] [place] [address]        Equal Access to Services     BRENNAN PALMA : Hadii antonio ruvalcabao Sodebora, waaxda luqadaha, qaybta kaalmada adeegyada, mandie chaudhary. So [facility] [phone].    ATENCIÓN: Si habla español, tiene a hobson disposición servicios gratuitos de asistencia lingüística. LlSelect Medical [facility] [phone].    We comply with applicable federal civil rights laws and [place] laws. We do not discriminate on the basis of race, color, national origin, age, disability, sex, sexual orientation, or gender identity.            Thank you!     Thank you for choosing [facility]  for your care. Our goal is always to provide you with excellent care. Hearing back from our patients is one way we can continue to improve our services. Please take a few minutes to complete the written survey that you may receive in the mail after your visit with us. Thank you!             Your Updated Medication List - Protect others around you: Learn how to safely use, store and throw away your medicines at www.disposemymeds.org.          This list is accurate as of 11/9/18 11:42 AM.  Always use your most recent med list.                   Brand Name Dispense Instructions for use Diagnosis    amitriptyline 25 MG tablet    ELAVIL    90 tablet    Take 1 tablet (25 mg) by mouth At Bedtime    Primary insomnia       estradiol 0.1 MG/24HR BIW patch    VIVELLE-DOT     24 patch    Place 1 patch onto the skin twice a week    Symptoms, such as flushing, sleeplessness, headache, lack of concentration, associated with the menopause       METHIMAZOLE PO           OMEGA-3 FISH OIL PO           VITAMIN C PO      Take  by mouth.        VITAMIN D PO      Take  by mouth.

## 2018-11-09 NOTE — PROGRESS NOTES
SUBJECTIVE:   CC: Shital Parrish is an 48 year old woman who presents for preventive health visit.     Healthy Habits:    Do you get at least three servings of calcium containing foods daily (dairy, green leafy vegetables, etc.)? yes    Amount of exercise or daily activities, outside of work: 7 day(s) per week    Problems taking medications regularly No    Medication side effects: No    Have you had an eye exam in the past two years? yes    Do you see a dentist twice per year? yes    Do you have sleep apnea, excessive snoring or daytime drowsiness?no      Today's PHQ-2 Score:   PHQ-2 (  [employer]) 2017   Q1: Little interest or pleasure in doing things 0 0   Q2: Feeling down, depressed or hopeless 0 0   PHQ-2 Score 0 0       Abuse: Current or Past(Physical, Sexual or Emotional)- No  Do you feel safe in your environment - Yes    Social History   Substance Use Topics     Smoking status: Never Smoker     Smokeless tobacco: Never Used     Alcohol use No     If you drink alcohol do you typically have >3 drinks per day or >7 drinks per week? No                     Reviewed orders with patient.  Reviewed health maintenance and updated orders accordingly - Yes  BP Readings from Last 3 Encounters:   18 115/83   18 116/74   17 102/66    Wt Readings from Last 3 Encounters:   18 153 lb (69.4 kg)   18 153 lb (69.4 kg)   17 165 lb (74.8 kg)                  Patient Active Problem List   Diagnosis     CARDIOVASCULAR SCREENING; LDL GOAL LESS THAN 160     Hyperthyroidism     S/P abdominal supracervical subtotal hysterectomy     Elevated fasting glucose     Past Surgical History:   Procedure Laterality Date     APPENDECTOMY        SECTION       CHOLECYSTECTOMY, LAPOROSCOPIC  2012    Cholecystectomy, Laparoscopic     COLONOSCOPY N/A 2017    Procedure: COLONOSCOPY;  COLONOSCOPY;  Surgeon: Shannon Noel MD;  Location:  GI     HYSTERECTOMY SUPRACERVICAL  N/A 11/11/2014    Procedure: HYSTERECTOMY SUPRACERVICAL;  Surgeon: Shauna Arceo MD;  Location: [facility] and [facility] done by Adis/ Leora. started as laparoscopic but converted to open due to adhesions     LAPAROSCOPIC CYSTECTOMY OVARIAN (ONCOLOGY)      right. benign cyst. partial oopherectomy in [facility]     LAPAROSCOPIC SALPINGO-OOPHORECTOMY  11/11/14     MYOMECTOMY UTERUS  2008    hysteroscopic     TONSILLECTOMY  1979       Social History   Substance Use Topics     Smoking status: Never Smoker     Smokeless tobacco: Never Used     Alcohol use No     Family History   Problem Relation Age of Onset     Diabetes Mother      Cancer Mother      melanoma     Colon Polyps Mother      Diabetes Maternal Grandmother      Breast Cancer Maternal Aunt      Thyroid Disease Cousin      Asthma No family hx of          Current Outpatient Prescriptions   Medication Sig Dispense Refill     Cholecalciferol (VITAMIN D PO) Take  by mouth.       estradiol (VIVELLE-DOT) 0.1 MG/24HR BIW patch Place 1 patch onto the skin twice a week 24 patch 3     METHIMAZOLE PO        Omega-3 Fatty Acids (OMEGA-3 FISH OIL PO)        amitriptyline (ELAVIL) 25 MG tablet Take 1 tablet (25 mg) by mouth At Bedtime (Patient not taking: Reported on 11/9/2018) 90 tablet 1     Ascorbic Acid (VITAMIN C PO) Take  by mouth.       Allergies   Allergen Reactions     Penicillins      Amoxicillin Rash     Recent Labs   Lab Test 07/02/18 11/24/17   1037 03/31/17 09/12/16   1116   09/30/15   1127  09/11/15   1115   A1C  5.1   --   5.0   --    --    --   5.2   LDL   --   134*   --   123*   --    --   125   HDL   --   65   --   58   --    --   52   TRIG   --   82   --   91   --    --   35   ALT   --   23   --   23   --   29   --    CR   --   0.56   --   0.59   --   0.63   --    GFRESTIMATED   --   >90   --   >90  Non  GFR Calc     --   >90  Non  GFR Calc     --    GFRESTBLACK   --   >90   --   >90   GFR Calc     --    >90   GFR Calc     --    POTASSIUM   --   3.7   --   5.3   --   3.7   --    TSH  1.88   --   0.82   --    < >   --    --     < > = values in this interval not displayed.        Mammogram not appropriate for this patient based on age.    Pertinent mammograms are reviewed under the imaging tab.  History of abnormal Pap smear: NO - age 30-65 PAP every 5 years with negative HPV co-testing recommended  PAP / HPV 2014 10/23/2012   PAP - NIL   HPV-ABSTRA Negative -     Reviewed and updated as needed this visit by clinical staff  Allergies         Reviewed and updated as needed this visit by Provider        Past Medical History:   Diagnosis Date     Edema 10/24/2013     Gastro-oesophageal reflux disease      Heavy periods 10/24/2013     Hyperthyroidism 2014    borderline-treated with a med for 6 months in [place] and then numbers improved. had a low TSH here and referred to [facility]. numbers there were borderline but normal and no antibodies. rec. -I123 uptake was increased so Graves dz dx'd. may do methimazole if repeat TFTs show hyperthyroid     Leiomyoma of uterus, unspecified 2014     Nipple discharge     right side only. neg mammo and right breast u/s. MRI pending     PONV (postoperative nausea and vomiting)      Varicose veins of lower extremities with other complications 10/24/2013      Past Surgical History:   Procedure Laterality Date     APPENDECTOMY        SECTION       CHOLECYSTECTOMY, LAPOROSCOPIC  2012    Cholecystectomy, Laparoscopic     COLONOSCOPY N/A 2017    Procedure: COLONOSCOPY;  COLONOSCOPY;  Surgeon: Shannon Noel MD;  Location:  GI     HYSTERECTOMY SUPRACERVICAL N/A 2014    Procedure: HYSTERECTOMY SUPRACERVICAL;  Surgeon: Shauna Arceo MD;  [facility] by Adis/ Leora. started as laparoscopic but converted to open due to adhesions     LAPAROSCOPIC CYSTECTOMY OVARIAN (ONCOLOGY)      right. benign  "cyst. partial oopherectomy in [facility]     LAPAROSCOPIC SALPINGO-OOPHORECTOMY  11/11/14     MYOMECTOMY UTERUS  2008    hysteroscopic     TONSILLECTOMY  1979       ROS:  CONSTITUTIONAL: NEGATIVE for fever, chills, change in weight  INTEGUMENTARU/SKIN: NEGATIVE for worrisome rashes, moles or lesions  EYES: NEGATIVE for vision changes or irritation  ENT: NEGATIVE for ear, mouth and throat problems  RESP: NEGATIVE for significant cough or SOB  BREAST: NEGATIVE for masses, tenderness or discharge  CV: NEGATIVE for chest pain, palpitations or peripheral edema  GI: NEGATIVE for nausea, abdominal pain, heartburn, or change in bowel habits  : NEGATIVE for unusual urinary or vaginal symptoms. Periods are regular.  MUSCULOSKELETAL: NEGATIVE for significant arthralgias or myalgia  NEURO: NEGATIVE for weakness, dizziness or paresthesias  PSYCHIATRIC: NEGATIVE for changes in mood or affect    OBJECTIVE:   /83 (Cuff Size: Adult Regular)  Pulse 84  Temp 98.4  F (36.9  C) (Tympanic)  Resp 16  Ht 5' 4\" (1.626 m)  Wt 153 lb (69.4 kg)  LMP 10/26/2014  SpO2 98%  BMI 26.26 kg/m2  EXAM:  GENERAL: healthy, alert and no distress  EYES: Eyes grossly normal to inspection, PERRL and conjunctivae and sclerae normal  HENT: ear canals and TM's normal, nose and mouth without ulcers or lesions  NECK: no adenopathy, no asymmetry, masses, or scars and thyroid normal to palpation  RESP: lungs clear to auscultation - no rales, rhonchi or wheezes  CV: regular rate and rhythm, normal S1 S2, no S3 or S4, no murmur, click or rub, no peripheral edema and peripheral pulses strong  ABDOMEN: soft, nontender, no hepatosplenomegaly, no masses and bowel sounds normal  MS: no gross musculoskeletal defects noted, no edema  SKIN: no suspicious lesions or rashes  NEURO: Normal strength and tone, mentation intact and speech normal  BACK: no CVA tenderness, no paralumbar tenderness  PSYCH: mentation appears normal, affect normal/bright  LYMPH: no " "cervical, supraclavicular, axillary, or inguinal adenopathy        ASSESSMENT/PLAN:       ICD-10-CM    1. Healthcare maintenance Z00.00 CBC with platelets     Comprehensive metabolic panel (BMP + Alb, Alk Phos, ALT, AST, Total. Bili, TP)     Lipid panel reflex to direct LDL Fasting       COUNSELING:   Reviewed preventive health counseling, as reflected in patient instructions    BP Readings from Last 1 Encounters:   11/09/18 115/83     Estimated body mass index is 26.26 kg/(m^2) as calculated from the following:    Height as of this encounter: 5' 4\" (1.626 m).    Weight as of this encounter: 153 lb (69.4 kg).           reports that she has never smoked. She has never used smokeless tobacco.      Counseling Resources:  ATP IV Guidelines  Pooled Cohorts Equation Calculator  Breast Cancer Risk Calculator  FRAX Risk Assessment  ICSI Preventive Guidelines  Dietary Guidelines for Americans, 2010  USDA's MyPlate  ASA Prophylaxis  Lung CA Screening    Tim Hill MD  [facility] [place]  "

## 2018-11-10 LAB
ALBUMIN SERPL-MCNC: 3.8 G/DL (ref 3.4–5)
ALP SERPL-CCNC: 39 U/L (ref 40–150)
ALT SERPL W P-5'-P-CCNC: 25 U/L (ref 0–50)
ANION GAP SERPL CALCULATED.3IONS-SCNC: 6 MMOL/L (ref 3–14)
AST SERPL W P-5'-P-CCNC: 24 U/L (ref 0–45)
BILIRUB SERPL-MCNC: 0.5 MG/DL (ref 0.2–1.3)
BUN SERPL-MCNC: 15 MG/DL (ref 7–30)
CALCIUM SERPL-MCNC: 8.7 MG/DL (ref 8.5–10.1)
CHLORIDE SERPL-SCNC: 104 MMOL/L (ref 94–109)
CHOLEST SERPL-MCNC: 218 MG/DL
CO2 SERPL-SCNC: 27 MMOL/L (ref 20–32)
CREAT SERPL-MCNC: 0.57 MG/DL (ref 0.52–1.04)
GFR SERPL CREATININE-BSD FRML MDRD: >90 ML/MIN/1.7M2
GLUCOSE SERPL-MCNC: 83 MG/DL (ref 70–99)
HDLC SERPL-MCNC: 59 MG/DL
LDLC SERPL CALC-MCNC: 142 MG/DL
NONHDLC SERPL-MCNC: 159 MG/DL
POTASSIUM SERPL-SCNC: 4.3 MMOL/L (ref 3.4–5.3)
PROT SERPL-MCNC: 7.2 G/DL (ref 6.8–8.8)
SODIUM SERPL-SCNC: 137 MMOL/L (ref 133–144)
T4 FREE SERPL-MCNC: 1.05 NG/DL (ref 0.76–1.46)
TRIGL SERPL-MCNC: 83 MG/DL
TSH SERPL DL<=0.005 MIU/L-ACNC: 0.73 MU/L (ref 0.4–4)

## 2018-11-11 ENCOUNTER — MYC MEDICAL ADVICE (OUTPATIENT)
Dept: FAMILY MEDICINE | Facility: CLINIC | Age: 48
End: 2018-11-11

## 2018-11-19 ENCOUNTER — TRANSFERRED RECORDS (OUTPATIENT)
Dept: HEALTH INFORMATION MANAGEMENT | Facility: CLINIC | Age: 48
End: 2018-11-19

## 2019-07-12 NOTE — PROGRESS NOTES
Shital is a 48 year old  female who presents for annual exam.     Besides routine health maintenance,  she would like to discuss the Estradiol patches not sticking much.    HPI:  The patient's PCP is  Tim Hill MD.    Patient has her PCP as well as her endo for her hyperthyroid.  Is due to see endo and have labs soon so will do that here and then can forward the labs to endo in time for her appointment  Feeling good on current methimazole dose. Working to try to wean her down if not off completely    Really happy with her ERT. No hotflashes or night sweats. No vaginal dryness. Somewhat decreased libido but still has a good sex life and meeting each other's needs.  Only issue is her patches used to stick just fine w/o any issues. Then got a couple packs were one of the four patches wouldn't stick. Now this last box or two not of the patches are sticking. Has moved them all over the body, putting them on when her skin is completely dry, and within an hour or two they are slipping and rolling. Has called [employer] to complain and they are willing to give her some replacements but if this keeps happening isn't sure what to do. Ideally didn't want to take oral ERT if possible for ease. Was putting the patch on her upper chest but now has moved it to her thigh. Was on her lower stomach as well.    Patient's biggest concern today is her daughter Robin. robin has depression and anxiety and is completely shutting her out. Feels like the american approach of she's an adult and the parents can't pry or push is so counter culture to her [place] upbringing that can't rectify that with how she wants to act, help, listen, be included. Offered several times to go to counseling all together as a family. She agreed initially but now taht she's staying at school for the summer it's not going to work. Home these last couple of days and just feels like she not present, engaged. Worries and cries about her  constantly      GYNECOLOGIC HISTORY:    Patient's last menstrual period was 10/26/2014.  Her current contraception method is: hysterectomy.  She  reports that she has quit smoking. She has never used smokeless tobacco.    Patient is sexually active.  STD testing offered?  Declined  Last PHQ-9 score on record =   PHQ-9 SCORE 7/15/2019   PHQ-9 Total Score 0     Last GAD7 score on record =   RACHEL-7 SCORE 7/15/2019   Total Score 0     Alcohol Score = 1    HEALTH MAINTENANCE:  Cholesterol: 2018   Total= 218, Triglycerides=83, HDL=59, YAS=139  Cholesterol   Date Value Ref Range Status   07/15/2019 218 (H) <200 mg/dL Final     Comment:     Desirable:       <200 mg/dl   2018 218 (H) <200 mg/dL Final     Comment:     Desirable:       <200 mg/dl      Last Mammo: 2018, Result: normal, Next Mammo: today   Pap: 2014 NIL, HPV-  Lab Results   Component Value Date    PAP NIL 10/23/2012      Colonoscopy:  2017, Result: normal, Next Colonoscopy: 5 years.  Dexa:  NA    Health maintenance updated:  yes    HISTORY:  OB History    Para Term  AB Living   6 2 2 0 4 2   SAB TAB Ectopic Multiple Live Births   2 2 0 0 2      # Outcome Date GA Lbr Rajesh/2nd Weight Sex Delivery Anes PTL Lv   6 Term 10/01/98    F CS-LTranv   ARASELI      Name: Myrto   5 Term 96    F CS-LTranv   ARASELI      Name: Nefeli   4 TAB            3 TAB            2 SAB            1 SAB                Patient Active Problem List   Diagnosis     CARDIOVASCULAR SCREENING; LDL GOAL LESS THAN 160     Hyperthyroidism     S/P abdominal supracervical subtotal hysterectomy     Elevated fasting glucose     Past Surgical History:   Procedure Laterality Date     APPENDECTOMY  1978      SECTION       CHOLECYSTECTOMY, LAPOROSCOPIC  2012    Cholecystectomy, Laparoscopic     COLONOSCOPY N/A 2017    Procedure: COLONOSCOPY;  COLONOSCOPY;  Surgeon: Shannon Noel MD;  Location:  GI     HYSTERECTOMY SUPRACERVICAL N/A 2014     "Procedure: HYSTERECTOMY SUPRACERVICAL;  Surgeon: Shauna Arceo MD;  Location: [facility] and LSO done by Adis/ Leora. started as laparoscopic but converted to open due to adhesions     LAPAROSCOPIC CYSTECTOMY OVARIAN (ONCOLOGY)      right. benign cyst. partial [facility]herectomy in [facility]     LAPAROSCOPIC SALPINGO-OOPHORECTOMY  11/11/14     MYOMECTOMY UTERUS  2008    hysteroscopic     TONSILLECTOMY  1979      Social History     Tobacco Use     Smoking status: Former Smoker     Smokeless tobacco: Never Used     Tobacco comment: Quit 31+ years ago.   Substance Use Topics     Alcohol use: Yes     Alcohol/week: 0.0 oz     Comment: Socially       Problem (# of Occurrences) Relation (Name,Age of Onset)    Breast Cancer (1) Maternal Aunt    Cancer (1) Mother: melanoma    Colon Polyps (1) Mother    Diabetes (2) Mother, Maternal Grandmother    Thyroid Disease (1) Cousin       Negative family history of: Asthma            Current Outpatient Medications   Medication Sig     estradiol (VIVELLE-DOT) 0.1 MG/24HR bi-weekly patch Place 1 patch onto the skin twice a week     METHIMAZOLE PO      Omega-3 Fatty Acids (OMEGA-3 FISH OIL PO)      No current facility-administered medications for this visit.      Allergies   Allergen Reactions     Penicillins      Amoxicillin Rash       Past medical, surgical, social and family histories were reviewed and updated in EPIC.    ROS:   12 point review of systems negative other than symptoms noted below.    EXAM:  /66   Pulse 68   Ht 1.6 m (5' 3\")   Wt 71.2 kg (157 lb)   LMP 10/26/2014   Breastfeeding? No   BMI 27.81 kg/m     BMI: Body mass index is 27.81 kg/m .    PHYSICAL EXAM:  Constitutional:  Appearance: Well nourished, well developed, alert, in no acute distress  Neck:  Lymph Nodes:  No lymphadenopathy present    Thyroid:  Gland size normal, nontender, no nodules or masses present  on palpation  Chest:  Respiratory Effort:  Breathing unlabored  Cardiovascular:    Heart: " Auscultation:  Regular rate, normal rhythm, no murmurs present  Breasts: Palpation of Breasts and Axillae:  No masses present on palpation, no breast tenderness. and No nodularity, asymmetry or nipple discharge bilaterally.  Gastrointestinal:   Abdominal Examination:  Abdomen nontender to palpation, tone normal without rigidity or guarding, no masses present, umbilicus without lesions   Liver and Spleen:  No hepatomegaly present, liver nontender to palpation    Hernias:  No hernias present  Lymphatic: Lymph Nodes:  No other lymphadenopathy present  Skin:  General Inspection:  No rashes present, no lesions present, no areas of  discoloration    Genitalia and Groin:  No rashes present, no lesions present, no areas of  discoloration, no masses present  Neurologic/Psychiatric:    Mental Status:  Oriented X3     Pelvic Exam:  External Genitalia:     Normal appearance for age, no discharge present, no tenderness present, no inflammatory lesions present, color normal  Vagina:     Normal vaginal vault without central or paravaginal defects, no discharge present, no inflammatory lesions present, no masses present  Bladder:     Nontender to palpation  Urethra:   Urethral Body:  Urethra palpation normal, urethra structural support normal   Urethral Meatus:  No erythema or lesions present  Cervix:     normal  Uterus:     Surgically absent  Adnexa:     No adnexal tenderness present, no adnexal masses present  Perineum:     Perineum within normal limits, no evidence of trauma, no rashes or skin lesions present  Anus:     Anus within normal limits, no hemorrhoids present  Inguinal Lymph Nodes:     No lymphadenopathy present  Pubic Hair:     Normal pubic hair distribution for age  Genitalia and Groin:     No rashes present, no lesions present, no areas of discoloration, no masses present      COUNSELING:   Reviewed preventive health counseling, as reflected in patient instructions  Special attention given to:        Osteoporosis  Prevention/Bone Health       (Esther)menopause management    BMI: Body mass index is 27.81 kg/m .  Weight management plan: Discussed healthy diet and exercise guidelines    ASSESSMENT:  48 year old female with satisfactory annual exam.    ICD-10-CM    1. Encounter for gynecological examination without abnormal finding Z01.419 Pap imaged thin layer screen with HPV - recommended age 30 - 65     HPV High Risk Types DNA Cervical   2. Symptoms, such as flushing, sleeplessness, headache, lack of concentration, associated with the menopause N95.1 estradiol (VIVELLE-DOT) 0.1 MG/24HR bi-weekly patch   3. Hyperthyroidism E05.90 T3, Free     TSH     T4 free     CANCELED: T4, free   4. Screening for cardiovascular condition Z13.6 Lipid panel reflex to direct LDL Fasting   5. Screening for metabolic disorder Z13.228 Comprehensive metabolic panel       PLAN:  Pap and HPV testing done  mammo today  Patient is following her hyperthyroidism with endo and have been trying to cut back on methimazole as much as possible. Ideally really wants to d/c it but not sure if she can. Will check full panel and then can forward those results to endo since doing her lipids and metabolic panel today anyway  Refill her vivelle. If continues to have issues with sticking can either switch brands/ or change to oral tab. Will contact me if continues to be problematic  Plan dexa in 1-2 yrs as likely now is menopausal    Spent some extra time offering guidance and support to patient over her daughter's depression/anxiety. Feels she just shuts them out, won't talk to them, won't see a counselor with them. Feels she's completely put all of her responsibility on her medication and now acts like she is helpless, this is a disease and she just has to take meds and not deal with lifes hardships. Is struggling to understand the issues and when meds are really the answer vs normal life stress and having to get through it. Strongly encouraged her, as I  did her daughter, to get in to counseling/therapy. Even if [place] not open to doing it with her then she can at least do it herself to try to understand her boundaries/limitations    Shauna Arceo MD

## 2019-07-15 ENCOUNTER — ANCILLARY PROCEDURE (OUTPATIENT)
Dept: MAMMOGRAPHY | Facility: CLINIC | Age: 49
End: 2019-07-15
Payer: COMMERCIAL

## 2019-07-15 ENCOUNTER — OFFICE VISIT (OUTPATIENT)
Dept: OBGYN | Facility: CLINIC | Age: 49
End: 2019-07-15
Payer: COMMERCIAL

## 2019-07-15 VITALS
HEIGHT: 63 IN | BODY MASS INDEX: 27.82 KG/M2 | HEART RATE: 68 BPM | WEIGHT: 157 LBS | DIASTOLIC BLOOD PRESSURE: 66 MMHG | SYSTOLIC BLOOD PRESSURE: 100 MMHG

## 2019-07-15 DIAGNOSIS — Z13.6 SCREENING FOR CARDIOVASCULAR CONDITION: ICD-10-CM

## 2019-07-15 DIAGNOSIS — N95.1 SYMPTOMS, SUCH AS FLUSHING, SLEEPLESSNESS, HEADACHE, LACK OF CONCENTRATION, ASSOCIATED WITH THE MENOPAUSE: ICD-10-CM

## 2019-07-15 DIAGNOSIS — Z13.228 SCREENING FOR METABOLIC DISORDER: ICD-10-CM

## 2019-07-15 DIAGNOSIS — E05.90 HYPERTHYROIDISM: ICD-10-CM

## 2019-07-15 DIAGNOSIS — Z01.419 ENCOUNTER FOR GYNECOLOGICAL EXAMINATION WITHOUT ABNORMAL FINDING: Primary | ICD-10-CM

## 2019-07-15 DIAGNOSIS — Z12.31 VISIT FOR SCREENING MAMMOGRAM: ICD-10-CM

## 2019-07-15 LAB — T3FREE SERPL-MCNC: 2.6 PG/ML (ref 2.3–4.2)

## 2019-07-15 PROCEDURE — 80053 COMPREHEN METABOLIC PANEL: CPT | Performed by: OBSTETRICS & GYNECOLOGY

## 2019-07-15 PROCEDURE — 84439 ASSAY OF FREE THYROXINE: CPT | Performed by: OBSTETRICS & GYNECOLOGY

## 2019-07-15 PROCEDURE — 87624 HPV HI-RISK TYP POOLED RSLT: CPT | Performed by: OBSTETRICS & GYNECOLOGY

## 2019-07-15 PROCEDURE — 77063 BREAST TOMOSYNTHESIS BI: CPT | Mod: TC

## 2019-07-15 PROCEDURE — 84481 FREE ASSAY (FT-3): CPT | Performed by: INTERNAL MEDICINE

## 2019-07-15 PROCEDURE — 84443 ASSAY THYROID STIM HORMONE: CPT | Performed by: OBSTETRICS & GYNECOLOGY

## 2019-07-15 PROCEDURE — G0145 SCR C/V CYTO,THINLAYER,RESCR: HCPCS | Performed by: OBSTETRICS & GYNECOLOGY

## 2019-07-15 PROCEDURE — 36415 COLL VENOUS BLD VENIPUNCTURE: CPT | Performed by: INTERNAL MEDICINE

## 2019-07-15 PROCEDURE — 77067 SCR MAMMO BI INCL CAD: CPT | Mod: TC

## 2019-07-15 PROCEDURE — 80061 LIPID PANEL: CPT | Performed by: OBSTETRICS & GYNECOLOGY

## 2019-07-15 PROCEDURE — 99396 PREV VISIT EST AGE 40-64: CPT | Performed by: OBSTETRICS & GYNECOLOGY

## 2019-07-15 RX ORDER — ESTRADIOL 0.1 MG/D
1 FILM, EXTENDED RELEASE TRANSDERMAL
Qty: 24 PATCH | Refills: 3 | Status: SHIPPED | OUTPATIENT
Start: 2019-07-15 | End: 2020-05-12

## 2019-07-15 ASSESSMENT — PATIENT HEALTH QUESTIONNAIRE - PHQ9
5. POOR APPETITE OR OVEREATING: NOT AT ALL
SUM OF ALL RESPONSES TO PHQ QUESTIONS 1-9: 0

## 2019-07-15 ASSESSMENT — MIFFLIN-ST. JEOR: SCORE: 1306.28

## 2019-07-15 ASSESSMENT — ANXIETY QUESTIONNAIRES
IF YOU CHECKED OFF ANY PROBLEMS ON THIS QUESTIONNAIRE, HOW DIFFICULT HAVE THESE PROBLEMS MADE IT FOR YOU TO DO YOUR WORK, TAKE CARE OF THINGS AT HOME, OR GET ALONG WITH OTHER PEOPLE: NOT DIFFICULT AT ALL
6. BECOMING EASILY ANNOYED OR IRRITABLE: NOT AT ALL
3. WORRYING TOO MUCH ABOUT DIFFERENT THINGS: NOT AT ALL
5. BEING SO RESTLESS THAT IT IS HARD TO SIT STILL: NOT AT ALL
7. FEELING AFRAID AS IF SOMETHING AWFUL MIGHT HAPPEN: NOT AT ALL
1. FEELING NERVOUS, ANXIOUS, OR ON EDGE: NOT AT ALL
2. NOT BEING ABLE TO STOP OR CONTROL WORRYING: NOT AT ALL
GAD7 TOTAL SCORE: 0

## 2019-07-16 LAB
ALBUMIN SERPL-MCNC: 3.9 G/DL (ref 3.4–5)
ALP SERPL-CCNC: 39 U/L (ref 40–150)
ALT SERPL W P-5'-P-CCNC: 20 U/L (ref 0–50)
ANION GAP SERPL CALCULATED.3IONS-SCNC: 5 MMOL/L (ref 3–14)
AST SERPL W P-5'-P-CCNC: 17 U/L (ref 0–45)
BILIRUB SERPL-MCNC: 0.5 MG/DL (ref 0.2–1.3)
BUN SERPL-MCNC: 9 MG/DL (ref 7–30)
CALCIUM SERPL-MCNC: 8.1 MG/DL (ref 8.5–10.1)
CHLORIDE SERPL-SCNC: 106 MMOL/L (ref 94–109)
CHOLEST SERPL-MCNC: 218 MG/DL
CO2 SERPL-SCNC: 28 MMOL/L (ref 20–32)
CREAT SERPL-MCNC: 0.6 MG/DL (ref 0.52–1.04)
GFR SERPL CREATININE-BSD FRML MDRD: >90 ML/MIN/{1.73_M2}
GLUCOSE SERPL-MCNC: 87 MG/DL (ref 70–99)
HDLC SERPL-MCNC: 59 MG/DL
LDLC SERPL CALC-MCNC: 143 MG/DL
NONHDLC SERPL-MCNC: 159 MG/DL
POTASSIUM SERPL-SCNC: 4 MMOL/L (ref 3.4–5.3)
PROT SERPL-MCNC: 7 G/DL (ref 6.8–8.8)
SODIUM SERPL-SCNC: 139 MMOL/L (ref 133–144)
T4 FREE SERPL-MCNC: 1.05 NG/DL (ref 0.76–1.46)
TRIGL SERPL-MCNC: 82 MG/DL
TSH SERPL DL<=0.005 MIU/L-ACNC: 0.76 MU/L (ref 0.4–4)

## 2019-07-16 ASSESSMENT — ANXIETY QUESTIONNAIRES: GAD7 TOTAL SCORE: 0

## 2019-07-17 ENCOUNTER — TELEPHONE (OUTPATIENT)
Dept: CARDIOLOGY | Facility: CLINIC | Age: 49
End: 2019-07-17

## 2019-07-17 ENCOUNTER — MYC MEDICAL ADVICE (OUTPATIENT)
Dept: OBGYN | Facility: CLINIC | Age: 49
End: 2019-07-17

## 2019-07-17 NOTE — TELEPHONE ENCOUNTER
Please see my chart message. Pt does not have a family history of osteoporosis. Routing to Dr. Arceo. Please advise.

## 2019-07-17 NOTE — TELEPHONE ENCOUNTER
Called and spoke with Pt per the request of Pt , Tianna.  Discussed Pt's recent Lipid Panel, lifestyle, cardiac health history and family cardiac history.  Pt noted her lipids have remained elevated despite increasing exercise and eating fairly healthy.  No prior use of cholesterol lowering therapy.  Pt noted she does like candy, but noted she doesn't consume overly regularly.  Carb intake is minimal.  No person history of cardiac disease.  Per Pt, her father has had a heart attack and also valve replacement.  Mother has history of elevated cholesterol.  Discussed that seeing Dr Sepulveda in Lipid clinic would be beneficial given her labs and FmHx.  Pt agreed and accepted 07/22 appointment.    Pat Rebollar LPN

## 2019-07-17 NOTE — TELEPHONE ENCOUNTER
RECORDS RECEIVED FROM: Internal   DATE RECEIVED: 7-22   NOTES STATUS DETAILS   OFFICE NOTE from referring provider    N/A    OFFICE NOTE from other cardiologist    N/A    DISCHARGE SUMMARY from hospital    N/A    DISCHARGE REPORT from the ER   N/A    OPERATIVE REPORT    N/A    MEDICATION LIST   Internal    LABS     BMP   Internal 11-27-17   CBC   Internal 11-9-18   CMP   Internal 7-15-19   Lipids   Internal 7-15-19   TSH   Internal 7-15-19   DIAGNOSTIC PROCEDURES     EKG   Received    Monitor Reports   N/A    IMAGING (DISC & REPORT)      Echo   N/A    Stress Tests   N/A    Cath   N/A    MRI/MRA   N/A    CT/CTA   N/A

## 2019-07-17 NOTE — TELEPHONE ENCOUNTER
Even though she didn't ask I am always thinking about that :)  I wouldn't do it quite yet. I do start before age 65 and somewhat depends on risk factors.  Her menopause dates are a little hard to know for sure b/c of the hyst but based on her need for ERT was probably in the last 1-2 yrs. I usually do about 3-4 yrs after menopause if menopause happens younger so probably another 1-2 yrs for her

## 2019-07-18 LAB
COPATH REPORT: NORMAL
PAP: NORMAL

## 2019-07-18 ASSESSMENT — ENCOUNTER SYMPTOMS
SLEEP DISTURBANCES DUE TO BREATHING: 0
EYE IRRITATION: 1
ORTHOPNEA: 0
LEG PAIN: 0
PALPITATIONS: 1
EYE REDNESS: 1
EYE PAIN: 0
EYE WATERING: 1
LIGHT-HEADEDNESS: 0
HYPOTENSION: 0
SYNCOPE: 0
DOUBLE VISION: 0
EXERCISE INTOLERANCE: 0
HYPERTENSION: 0

## 2019-07-19 LAB
FINAL DIAGNOSIS: NORMAL
HPV HR 12 DNA CVX QL NAA+PROBE: NEGATIVE
HPV16 DNA SPEC QL NAA+PROBE: NEGATIVE
HPV18 DNA SPEC QL NAA+PROBE: NEGATIVE
SPECIMEN DESCRIPTION: NORMAL
SPECIMEN SOURCE CVX/VAG CYTO: NORMAL

## 2019-07-22 ENCOUNTER — ANCILLARY PROCEDURE (OUTPATIENT)
Dept: CARDIOLOGY | Facility: CLINIC | Age: 49
End: 2019-07-22
Attending: INTERNAL MEDICINE
Payer: COMMERCIAL

## 2019-07-22 ENCOUNTER — PRE VISIT (OUTPATIENT)
Dept: CARDIOLOGY | Facility: CLINIC | Age: 49
End: 2019-07-22

## 2019-07-22 VITALS
OXYGEN SATURATION: 99 % | BODY MASS INDEX: 27.85 KG/M2 | WEIGHT: 157.2 LBS | DIASTOLIC BLOOD PRESSURE: 72 MMHG | SYSTOLIC BLOOD PRESSURE: 104 MMHG | HEART RATE: 84 BPM | HEIGHT: 63 IN

## 2019-07-22 DIAGNOSIS — R00.2 PALPITATIONS: ICD-10-CM

## 2019-07-22 DIAGNOSIS — R00.2 PALPITATIONS: Primary | ICD-10-CM

## 2019-07-22 PROCEDURE — 0298T ZZC EXT ECG > 48HR TO 21 DAY REVIEW AND INTERPRETATN: CPT | Mod: ZP | Performed by: INTERNAL MEDICINE

## 2019-07-22 PROCEDURE — 93005 ELECTROCARDIOGRAM TRACING: CPT | Mod: ZF

## 2019-07-22 PROCEDURE — G0463 HOSPITAL OUTPT CLINIC VISIT: HCPCS | Mod: 25,ZF

## 2019-07-22 PROCEDURE — 93010 ELECTROCARDIOGRAM REPORT: CPT | Mod: ZP | Performed by: INTERNAL MEDICINE

## 2019-07-22 PROCEDURE — 0296T ZIO PATCH HOLTER ADULT PEDIATRIC GREATER THAN 48 HRS: CPT | Mod: ZF

## 2019-07-22 PROCEDURE — 99202 OFFICE O/P NEW SF 15 MIN: CPT | Mod: ZP | Performed by: INTERNAL MEDICINE

## 2019-07-22 ASSESSMENT — MIFFLIN-ST. JEOR: SCORE: 1307.18

## 2019-07-22 ASSESSMENT — PAIN SCALES - GENERAL: PAINLEVEL: NO PAIN (0)

## 2019-07-22 NOTE — PROGRESS NOTES
Per Dr. Sepulveda, patient to have 14 day Zio monitor placed.  Diagnosis: palpitations   Monitor placed: Yes  Patient Instructed: Yes  Patient verbalized understanding: Yes  Holter # [id number]  Placed by Tyrese Masters

## 2019-07-22 NOTE — PATIENT INSTRUCTIONS
Patient Instructions:  It was a pleasure to see you in the cardiology clinic today.      If you have any questions, you can reach my nurse, Pat GUERRA LPN, at [phone].  Press Option #1 for the [facility], and then press Option #4 for nursing.    We are encouraging the use of QThruhart to communicate with your HealthCare Provider    Medication Changes: None.    Recommendations:  - 14 day Cardiac Event Monitor (Ziopatch)    Studies Ordered: None.    The results from today include: None.    Please follow up: With Dr. Sepulveda based on results of testing.    Sincerely,    David Sepulveda MD     If you have an urgent need after hours (8:00 am to 4:30 pm) please call [phone] and ask for the cardiology fellow on call.

## 2019-07-22 NOTE — LETTER
2019    RE: Shital Parrish  [address]  [place] [address]     Dear Colleague,    Thank you for the opportunity to participate in the care of your patient, Shital Parrish, at the [facility] at [facility]. Please see a copy of my visit note below.    HPI:   I had the privilege to evaluate and examine Ms Shital Parrish, who is 48 yr female patient with a palpitations unrelated with effort, sometimes more at rest.  Patient denies chest pain, shortness of breath and intermittent claudication.  Patient does not complaint about limitations during effort.    PAST MEDICAL HISTORY:  Past Medical History:   Diagnosis Date     Edema 10/24/2013     Gastro-oesophageal reflux disease      Heavy periods 10/24/2013     Hyperthyroidism 2014    borderline-treated with a med for 6 months in [place] and then numbers improved. had a low TSH here and referred to [facility]. numbers there were borderline but normal and no antibodies. rec. -I123 uptake was increased so Graves dz dx'd. may do methimazole if repeat TFTs show hyperthyroid     Leiomyoma of uterus, unspecified 2014     Nipple discharge     right side only. neg mammo and right breast u/s. MRI pending     PONV (postoperative nausea and vomiting)      Varicose veins of lower extremities with other complications 10/24/2013       CURRENT MEDICATIONS:  Current Outpatient Medications   Medication Sig Dispense Refill     estradiol (VIVELLE-DOT) 0.1 MG/24HR bi-weekly patch Place 1 patch onto the skin twice a week 24 patch 3     METHIMAZOLE PO        Omega-3 Fatty Acids (OMEGA-3 FISH OIL PO)          PAST SURGICAL HISTORY:  Past Surgical History:   Procedure Laterality Date     APPENDECTOMY        SECTION       CHOLECYSTECTOMY, LAPOROSCOPIC  2012    Cholecystectomy, Laparoscopic     COLONOSCOPY N/A 2017    Procedure: COLONOSCOPY;  COLONOSCOPY;  Surgeon: Shannon Noel MD;  Location: [facility]      HYSTERECTOMY SUPRACERVICAL N/A 11/11/2014    Procedure: HYSTERECTOMY SUPRACERVICAL;  Surgeon: Shauna Arceo MD;  Location: [facility] and [facility] done by Ad[facility]/ Le[facility]. started as laparoscopic but converted to open due to adhesions     LAPAROSCOPIC CYSTECTOMY OVARIAN (ONCOLOGY)      right. benign cyst. partial oopherectomy in [facility]     LAPAROSCOPIC SALPINGO-OOPHORECTOMY  11/11/14     MYOMECTOMY UTERUS  2008    hysteroscopic     TONSILLECTOMY  1979       ALLERGIES     Allergies   Allergen Reactions     Penicillins      Amoxicillin Rash       FAMILY HISTORY:  Family History   Problem Relation Age of Onset     Diabetes Mother      Cancer Mother         melanoma     Colon Polyps Mother      Diabetes Maternal Grandmother      Breast Cancer Maternal Aunt      Thyroid Disease Cousin      Asthma No family hx of        SOCIAL HISTORY:  Social History     Socioeconomic History     Marital status:      Spouse name: Marianna     Number of children: 2     Years of education: 16     Highest education level: None   Occupational History     Occupation: Program Leader     Comment: [employer]     Employer: [employer]    Social Needs     Financial resource strain: None     Food insecurity:     Worry: None     Inability: None     Transportation needs:     Medical: None     Non-medical: None   Tobacco Use     Smoking status: Former Smoker     Smokeless tobacco: Never Used     Tobacco comment: Quit 31+ years ago.   Substance and Sexual Activity     Alcohol use: Yes     Alcohol/week: 0.0 oz     Comment: Socially      Drug use: No     Sexual activity: Yes     Partners: Male     Birth control/protection: Female Surgical     Comment: hysterectomy   Lifestyle     Physical activity:     Days per week: None     Minutes per session: None     Stress: None   Relationships     Social connections:     Talks on phone: None     Gets together: None     Attends [employer] service: None     Active member of club or organization: None      "Attends meetings of clubs or organizations: None     Relationship status: None     Intimate partner violence:     Fear of current or ex partner: None     Emotionally abused: None     Physically abused: None     Forced sexual activity: None   Other Topics Concern      Service Not Asked     Blood Transfusions No     Caffeine Concern Not Asked     Occupational Exposure Not Asked     Hobby Hazards Not Asked     Sleep Concern Not Asked     Stress Concern Not Asked     Weight Concern Not Asked     Special Diet No     Back Care Not Asked     Exercise No     Bike Helmet Not Asked     Comment: NA     Seat Belt Yes     Self-Exams Not Asked     Parent/sibling w/ CABG, MI or angioplasty before 65F 55M? Not Asked   Social History Narrative    The patient was born in [facility]. She eats fruits and vegetables every day. She takes calcium supplement and vitamin D.        No advanced care directives on file           ROS:   Constitutional: No fever, chills, or sweats. No weight gain/loss   ENT: No visual disturbance, ear ache, epistaxis, sore throat  Allergies/Immunologic: Negative.   Respiratory: No cough, hemoptysia  Cardiovascular: As per HPI  GI: No nausea, vomiting, hematemesis, melena, or hematochezia  : No urinary frequency, dysuria, or hematuria  Integument: Negative  Psychiatric: Negative  Neuro: Negative  Endocrinology: Negative   Musculoskeletal: Negative    EXAM:  /72 (BP Location: Right arm, Patient Position: Chair, Cuff Size: Adult Regular)   Pulse 84   Ht 1.6 m (5' 3\")   Wt 71.3 kg (157 lb 3.2 oz)   LMP 10/26/2014   SpO2 99%   BMI 27.85 kg/m     In general, the patient is a pleasant female in no apparent distress.    HEENT: NC/AT.  PERRLA.  EOMI.  Sclerae white, not injected.  Nares clear.  Pharynx without erythema or exudate.  Dentition intact.    Neck: No adenopathy.  No thyromegaly. Carotids +4/4 bilaterally without bruits.  No jugular venous distension.   Heart: RRR. Normal S1, S2 splits " physiologically. No murmur, rub, click, or gallop. The PMI is in the 5th ICS in the midclavicular line. There is no heave.    Lungs: CTA.  No ronchi, wheezes, rales.  No dullness to percussion.   Abdomen: Soft, nontender, nondistended. No organomegaly.  No bruits.   Extremities: No clubbing, cyanosis, or edema.  The pulses are +4/4 at the radial, brachial, femoral, popliteal, DP, and PT sites bilaterally.  No bruits are noted.  Neurologic: Alert and oriented to person/place/time, normal speech, gait and affect  Skin: No petechiae, purpura or rash.    Labs:       Ref. Range 7/15/2019 11:46   Sodium Latest Ref Range: 133 - 144 mmol/L 139   Potassium Latest Ref Range: 3.4 - 5.3 mmol/L 4.0   Chloride Latest Ref Range: 94 - 109 mmol/L 106   Carbon Dioxide Latest Ref Range: 20 - 32 mmol/L 28   Urea Nitrogen Latest Ref Range: 7 - 30 mg/dL 9   Creatinine Latest Ref Range: 0.52 - 1.04 mg/dL 0.60   GFR Estimate Latest Ref Range: >60 mL/min/1.73_m2 >90   GFR Estimate If Black Latest Ref Range: >60 mL/min/1.73_m2 >90   Calcium Latest Ref Range: 8.5 - 10.1 mg/dL 8.1 (L)   Anion Gap Latest Ref Range: 3 - 14 mmol/L 5   Albumin Latest Ref Range: 3.4 - 5.0 g/dL 3.9   Protein Total Latest Ref Range: 6.8 - 8.8 g/dL 7.0   Bilirubin Total Latest Ref Range: 0.2 - 1.3 mg/dL 0.5   Alkaline Phosphatase Latest Ref Range: 40 - 150 U/L 39 (L)   ALT Latest Ref Range: 0 - 50 U/L 20   AST Latest Ref Range: 0 - 45 U/L 17   Cholesterol Latest Ref Range: <200 mg/dL 218 (H)   Free T3 Latest Ref Range: 2.3 - 4.2 pg/mL 2.6   HDL Cholesterol Latest Ref Range: >49 mg/dL 59   LDL Cholesterol Calculated Latest Ref Range: <100 mg/dL 143 (H)   Non HDL Cholesterol Latest Ref Range: <130 mg/dL 159 (H)   T4 Free Latest Ref Range: 0.76 - 1.46 ng/dL 1.05   Triglycerides Latest Ref Range: <150 mg/dL 82   TSH Latest Ref Range: 0.40 - 4.00 mU/L 0.76   Glucose Latest Ref Range: 70 - 99 mg/dL 87     Assessment and Plan:     We discussed the results with patient:  We  discussed the importance of a heart healthy lifestyle and lipid-lowering diet.  Because pf the palpitations we ordered  a  14 day Cardiac Event Monitor (Ziopatch)  Once we have the results of the ziopatch, we will discuss these results with patient  And start therapy if necessary.    David Sepulveda MD, PhD  Professor of Medicine  Division of Cardiology    CC  Patient Care Team:  Tim Hill MD as PCP - General (Family Practice)  Tim Hill MD as Assigned PCP  SELF, REFERRED

## 2019-07-22 NOTE — NURSING NOTE
Cardiac Monitors: Patient was instructed regarding the indication, function, care and prompt return of a event monitor. The monitor was placed on the patient with instructions regarding care of the skin electrodes and monitor, as well as documentation in the patient diary. Patient demonstrated understanding of this information and agreed to call with further questions or concerns.  Med Reconcile: Reviewed and verified all current medications with the patient. The updated medication list was printed and given to the patient.  Return Appointment: Patient given instructions regarding scheduling next clinic visit. Patient demonstrated understanding of this information and agreed to call with further questions or concerns.  Patient stated she understood all health information given and agreed to call with further questions or concerns.    Pat Rebollar LPN

## 2019-07-22 NOTE — PROGRESS NOTES
HPI:     I had the privilege to evaluate and examine Ms Shital Parrish, who is 48 yr female patient with a palpitations unrelated with effort, sometimes more at rest.  Patient denies chest pain, shortness of breath and intermittent claudication.  Patient does not complaint about limitations during effort.    PAST MEDICAL HISTORY:  Past Medical History:   Diagnosis Date     Edema 10/24/2013     Gastro-oesophageal reflux disease      Heavy periods 10/24/2013     Hyperthyroidism 2014    borderline-treated with a med for 6 months in [place] and then numbers improved. had a low TSH here and referred to [facility]. numbers there were borderline but normal and no antibodies. rec. -I123 uptake was increased so Graves dz dx'd. may do methimazole if repeat TFTs show hyperthyroid     Leiomyoma of uterus, unspecified 2014     Nipple discharge     right side only. neg mammo and right breast u/s. MRI pending     PONV (postoperative nausea and vomiting)      Varicose veins of lower extremities with other complications 10/24/2013       CURRENT MEDICATIONS:  Current Outpatient Medications   Medication Sig Dispense Refill     estradiol (VIVELLE-DOT) 0.1 MG/24HR bi-weekly patch Place 1 patch onto the skin twice a week 24 patch 3     METHIMAZOLE PO        Omega-3 Fatty Acids (OMEGA-3 FISH OIL PO)          PAST SURGICAL HISTORY:  Past Surgical History:   Procedure Laterality Date     APPENDECTOMY        SECTION       CHOLECYSTECTOMY, LAPOROSCOPIC  2012    Cholecystectomy, Laparoscopic     COLONOSCOPY N/A 2017    Procedure: COLONOSCOPY;  COLONOSCOPY;  Surgeon: Shannon Noel MD;  Location:  [facility]     HYSTERECTOMY SUPRACERVICAL N/A 2014    Procedure: HYSTERECTOMY SUPRACERVICAL;  Surgeon: Shauna Arceo MD;  [facility]. started as laparoscopic but converted to open due to adhesions     LAPAROSCOPIC CYSTECTOMY OVARIAN (ONCOLOGY)      right. benign cyst.  [facility] in [facility]     LAPAROSCOPIC SALPINGO-OOPHORECTOMY  11/11/14     MYOMECTOMY UTERUS  2008    hysteroscopic     TONSILLECTOMY  1979       ALLERGIES     Allergies   Allergen Reactions     Penicillins      Amoxicillin Rash       FAMILY HISTORY:  Family History   Problem Relation Age of Onset     Diabetes Mother      Cancer Mother         melanoma     Colon Polyps Mother      Diabetes Maternal Grandmother      Breast Cancer Maternal Aunt      Thyroid Disease Cousin      Asthma No family hx of        SOCIAL HISTORY:  Social History     Socioeconomic History     Marital status:      Spouse name: Marianna     Number of children: 2     Years of education: 16     Highest education level: None   Occupational History     Occupation: Program Leader     Comment: [employer]     Employer: [employer]    Social Needs     Financial resource strain: None     Food insecurity:     Worry: None     Inability: None     Transportation needs:     Medical: None     Non-medical: None   Tobacco Use     Smoking status: Former Smoker     Smokeless tobacco: Never Used     Tobacco comment: Quit 31+ years ago.   Substance and Sexual Activity     Alcohol use: Yes     Alcohol/week: 0.0 oz     Comment: Socially      Drug use: No     Sexual activity: Yes     Partners: Male     Birth control/protection: Female Surgical     Comment: hysterectomy   Lifestyle     Physical activity:     Days per week: None     Minutes per session: None     Stress: None   Relationships     Social connections:     Talks on phone: None     Gets together: None     Attends Alevism service: None     Active member of club or organization: None     Attends meetings of clubs or organizations: None     Relationship status: None     Intimate partner violence:     Fear of current or ex partner: None     Emotionally abused: None     Physically abused: None     Forced sexual activity: None   Other Topics Concern      Service Not Asked     Blood  "Transfusions No     Caffeine Concern Not Asked     Occupational Exposure Not Asked     Hobby Hazards Not Asked     Sleep Concern Not Asked     Stress Concern Not Asked     Weight Concern Not Asked     Special Diet No     Back Care Not Asked     Exercise No     Bike Helmet Not Asked     Comment: NA     Seat Belt Yes     Self-Exams Not Asked     Parent/sibling w/ CABG, MI or angioplasty before 65F 55M? Not Asked   Social History Narrative    The patient was born in [facility]. She eats fruits and vegetables every day. She takes calcium supplement and vitamin D.        No advanced care directives on file           ROS:   Constitutional: No fever, chills, or sweats. No weight gain/loss   ENT: No visual disturbance, ear ache, epistaxis, sore throat  Allergies/Immunologic: Negative.   Respiratory: No cough, hemoptysia  Cardiovascular: As per HPI  GI: No nausea, vomiting, hematemesis, melena, or hematochezia  : No urinary frequency, dysuria, or hematuria  Integument: Negative  Psychiatric: Negative  Neuro: Negative  Endocrinology: Negative   Musculoskeletal: Negative    EXAM:  /72 (BP Location: Right arm, Patient Position: Chair, Cuff Size: Adult Regular)   Pulse 84   Ht 1.6 m (5' 3\")   Wt 71.3 kg (157 lb 3.2 oz)   LMP 10/26/2014   SpO2 99%   BMI 27.85 kg/m    In general, the patient is a pleasant female in no apparent distress.    HEENT: NC/AT.  PERRLA.  EOMI.  Sclerae white, not injected.  Nares clear.  Pharynx without erythema or exudate.  Dentition intact.    Neck: No adenopathy.  No thyromegaly. Carotids +4/4 bilaterally without bruits.  No jugular venous distension.   Heart: RRR. Normal S1, S2 splits physiologically. No murmur, rub, click, or gallop. The PMI is in the 5th ICS in the midclavicular line. There is no heave.    Lungs: CTA.  No ronchi, wheezes, rales.  No dullness to percussion.   Abdomen: Soft, nontender, nondistended. No organomegaly.  No bruits.   Extremities: No clubbing, cyanosis, or " edema.  The pulses are +4/4 at the radial, brachial, femoral, popliteal, DP, and PT sites bilaterally.  No bruits are noted.  Neurologic: Alert and oriented to person/place/time, normal speech, gait and affect  Skin: No petechiae, purpura or rash.    Labs:       Ref. Range 7/15/2019 11:46   Sodium Latest Ref Range: 133 - 144 mmol/L 139   Potassium Latest Ref Range: 3.4 - 5.3 mmol/L 4.0   Chloride Latest Ref Range: 94 - 109 mmol/L 106   Carbon Dioxide Latest Ref Range: 20 - 32 mmol/L 28   Urea Nitrogen Latest Ref Range: 7 - 30 mg/dL 9   Creatinine Latest Ref Range: 0.52 - 1.04 mg/dL 0.60   GFR Estimate Latest Ref Range: >60 mL/min/1.73_m2 >90   GFR Estimate If Black Latest Ref Range: >60 mL/min/1.73_m2 >90   Calcium Latest Ref Range: 8.5 - 10.1 mg/dL 8.1 (L)   Anion Gap Latest Ref Range: 3 - 14 mmol/L 5   Albumin Latest Ref Range: 3.4 - 5.0 g/dL 3.9   Protein Total Latest Ref Range: 6.8 - 8.8 g/dL 7.0   Bilirubin Total Latest Ref Range: 0.2 - 1.3 mg/dL 0.5   Alkaline Phosphatase Latest Ref Range: 40 - 150 U/L 39 (L)   ALT Latest Ref Range: 0 - 50 U/L 20   AST Latest Ref Range: 0 - 45 U/L 17   Cholesterol Latest Ref Range: <200 mg/dL 218 (H)   Free T3 Latest Ref Range: 2.3 - 4.2 pg/mL 2.6   HDL Cholesterol Latest Ref Range: >49 mg/dL 59   LDL Cholesterol Calculated Latest Ref Range: <100 mg/dL 143 (H)   Non HDL Cholesterol Latest Ref Range: <130 mg/dL 159 (H)   T4 Free Latest Ref Range: 0.76 - 1.46 ng/dL 1.05   Triglycerides Latest Ref Range: <150 mg/dL 82   TSH Latest Ref Range: 0.40 - 4.00 mU/L 0.76   Glucose Latest Ref Range: 70 - 99 mg/dL 87              Assessment and Plan:     We discussed the results with patient:  We discussed the importance of a heart healthy lifestyle and lipid-lowering diet.  Because pf the palpitations we ordered  a  14 day Cardiac Event Monitor (Jovanopatch)  Once we have the results of the ziopatch, we will discuss these results with patient  And start therapy if necessary.      David  MD Derick, PhD  Professor of Medicine  Division of Cardiology        CC  Patient Care Team:  Tim Hill MD as PCP - General (Family Practice)  Tim Hill MD as Assigned PCP  SELF, REFERRED  Answers for HPI/ROS submitted by the patient on 7/18/2019   General Symptoms: No  Skin Symptoms: No  HENT Symptoms: No  EYE SYMPTOMS: Yes  HEART SYMPTOMS: Yes  LUNG SYMPTOMS: No  INTESTINAL SYMPTOMS: No  URINARY SYMPTOMS: No  GYNECOLOGIC SYMPTOMS: No  BREAST SYMPTOMS: No  SKELETAL SYMPTOMS: No  BLOOD SYMPTOMS: No  NERVOUS SYSTEM SYMPTOMS: No  MENTAL HEALTH SYMPTOMS: No  Eye pain: No  Vision loss: No  Dry eyes: Yes  Watery eyes: Yes  Eye bulging: No  Double vision: No  Flashing of lights: No  Spots: No  Floaters: No  Redness: Yes  Crossed eyes: No  Tunnel Vision: No  Yellowing of eyes: No  Eye irritation: Yes  Chest pain or pressure: No  Fast or irregular heartbeat: Yes  Pain in legs with walking: No  Trouble breathing while lying down: No  Fingers or toes appear blue: No  High blood pressure: No  Low blood pressure: No  Fainting: No  Murmurs: No  Pacemaker: No  Varicose veins: No  Edema or swelling: No  Wake up at night with shortness of breath: No  Light-headedness: No  Exercise intolerance: No

## 2019-07-22 NOTE — NURSING NOTE
Chief Complaint   Patient presents with     New Patient      New Pt Visit - Lipid Clinic     Vitals were taken and medications were reconciled. EKG was performed.    Anne Nieves  11:47 AM

## 2019-08-06 DIAGNOSIS — N95.1 SYMPTOMS, SUCH AS FLUSHING, SLEEPLESSNESS, HEADACHE, LACK OF CONCENTRATION, ASSOCIATED WITH THE MENOPAUSE: ICD-10-CM

## 2019-08-06 RX ORDER — ESTRADIOL 0.1 MG/D
PATCH, EXTENDED RELEASE TRANSDERMAL
Qty: 8 PATCH | Refills: 11 | OUTPATIENT
Start: 2019-08-06

## 2019-08-06 NOTE — TELEPHONE ENCOUNTER
"Requested Prescriptions   Pending Prescriptions Disp Refills     VIVELLE-DOT 0.1 MG/24HR bi-weekly patch [Pharmacy Med Name: VIVELLE-DOT 0.1 MG PATCH] 8 patch 11     Sig: PLACE 1 PATCH ONTO THE SKIN TWICE A WEEK       Hormone Replacement Therapy Passed - 8/6/2019  1:14 AM        Passed - Blood pressure under 140/90 in past 12 months     BP Readings from Last 3 Encounters:   07/22/19 104/72   07/15/19 100/66   11/09/18 115/83                 Passed - Recent (12 mo) or future (30 days) visit within the authorizing provider's specialty     Patient had office visit in the last 12 months or has a visit in the next 30 days with authorizing provider or within the authorizing provider's specialty.  See \"Patient Info\" tab in inbasket, or \"Choose Columns\" in Meds & Orders section of the refill encounter.              Passed - Medication is active on med list        Passed - Patient is 18 years of age or older        Passed - No active pregnancy on record        Passed - No positive pregnancy test on record in past 12 months        Pt has refills available  Bettye Santizo RN on 8/6/2019 at 7:38 AM    "

## 2019-10-16 ENCOUNTER — TRANSFERRED RECORDS (OUTPATIENT)
Dept: HEALTH INFORMATION MANAGEMENT | Facility: CLINIC | Age: 49
End: 2019-10-16

## 2019-10-17 ENCOUNTER — OFFICE VISIT (OUTPATIENT)
Dept: FAMILY MEDICINE | Facility: CLINIC | Age: 49
End: 2019-10-17
Payer: COMMERCIAL

## 2019-10-17 ENCOUNTER — TRANSFERRED RECORDS (OUTPATIENT)
Dept: HEALTH INFORMATION MANAGEMENT | Facility: CLINIC | Age: 49
End: 2019-10-17

## 2019-10-17 VITALS
OXYGEN SATURATION: 98 % | BODY MASS INDEX: 28.35 KG/M2 | SYSTOLIC BLOOD PRESSURE: 112 MMHG | WEIGHT: 160 LBS | HEIGHT: 63 IN | HEART RATE: 97 BPM | RESPIRATION RATE: 14 BRPM | DIASTOLIC BLOOD PRESSURE: 60 MMHG | TEMPERATURE: 98.9 F

## 2019-10-17 DIAGNOSIS — H10.13 ALLERGIC CONJUNCTIVITIS, BILATERAL: ICD-10-CM

## 2019-10-17 DIAGNOSIS — J30.2 SEASONAL ALLERGIC RHINITIS, UNSPECIFIED TRIGGER: ICD-10-CM

## 2019-10-17 DIAGNOSIS — Z12.11 COLON CANCER SCREENING: Primary | ICD-10-CM

## 2019-10-17 LAB
GLUCOSE SERPL-MCNC: 84 MG/DL (ref 65–99)
HBA1C MFR BLD: 5.1 % (ref 4–6)
TSH SERPL-ACNC: 0.68 UIU/ML (ref 0.3–5)

## 2019-10-17 PROCEDURE — 99213 OFFICE O/P EST LOW 20 MIN: CPT | Performed by: FAMILY MEDICINE

## 2019-10-17 RX ORDER — MONTELUKAST SODIUM 10 MG/1
10 TABLET ORAL AT BEDTIME
Qty: 30 TABLET | Refills: 3 | Status: SHIPPED | OUTPATIENT
Start: 2019-10-17 | End: 2022-08-10

## 2019-10-17 RX ORDER — LEVOCETIRIZINE DIHYDROCHLORIDE 5 MG/1
5 TABLET, FILM COATED ORAL EVERY EVENING
COMMUNITY
End: 2022-08-10

## 2019-10-17 RX ORDER — OLOPATADINE HYDROCHLORIDE 1 MG/ML
1 SOLUTION/ DROPS OPHTHALMIC 2 TIMES DAILY
Qty: 1 BOTTLE | Refills: 3 | Status: SHIPPED | OUTPATIENT
Start: 2019-10-17 | End: 2020-05-19

## 2019-10-17 ASSESSMENT — MIFFLIN-ST. JEOR: SCORE: 1319.89

## 2019-10-17 NOTE — PROGRESS NOTES
Subjective     Shital Parrish is a 48 year old female who presents to clinic today for the following health issues:    HPI    Allergies       Duration: about 1 year     Description:   Nasal congestion: YES  Sneezing: YES   Red, itchy eyes: YES    Accompanying signs and symptoms: itchy skin on and off     History (similar episodes/allergy testing): None    Precipitating or alleviating factors: outdoors and cats     Therapies tried and outcome: Benadryl, Xyzal      Patient Active Problem List   Diagnosis     CARDIOVASCULAR SCREENING; LDL GOAL LESS THAN 160     Hyperthyroidism     S/P abdominal supracervical subtotal hysterectomy     Elevated fasting glucose     Past Surgical History:   Procedure Laterality Date     APPENDECTOMY        SECTION       CHOLECYSTECTOMY, LAPOROSCOPIC  2012    Cholecystectomy, Laparoscopic     COLONOSCOPY N/A 2017    Procedure: COLONOSCOPY;  COLONOSCOPY;  Surgeon: Shannon Noel MD;  Location:  GI     HYSTERECTOMY SUPRACERVICAL N/A 2014    Procedure: HYSTERECTOMY SUPRACERVICAL;  Surgeon: Shauna Arceo MD;  [facility]. started as laparoscopic but converted to open due to adhesions     LAPAROSCOPIC CYSTECTOMY OVARIAN (ONCOLOGY)      right. benign cyst. partial oopherectomy in [facility]     LAPAROSCOPIC SALPINGO-OOPHORECTOMY  14     MYOMECTOMY UTERUS  2008    hysteroscopic     TONSILLECTOMY         Social History     Tobacco Use     Smoking status: Former Smoker     Smokeless tobacco: Never Used     Tobacco comment: Quit 31+ years ago.   Substance Use Topics     Alcohol use: Yes     Alcohol/week: 0.0 standard drinks     Comment: Socially      Family History   Problem Relation Age of Onset     Diabetes Mother      Cancer Mother         melanoma     Colon Polyps Mother      Diabetes Maternal Grandmother      Breast Cancer Maternal Aunt      Thyroid Disease Cousin      Asthma No family hx of          Current  "Outpatient Medications   Medication Sig Dispense Refill     estradiol (VIVELLE-DOT) 0.1 MG/24HR bi-weekly patch Place 1 patch onto the skin twice a week 24 patch 3     levocetirizine (XYZAL) 5 MG tablet Take 5 mg by mouth every evening       METHIMAZOLE PO        montelukast (SINGULAIR) 10 MG tablet Take 1 tablet (10 mg) by mouth At Bedtime 30 tablet 3     olopatadine (PATANOL) 0.1 % ophthalmic solution Place 1 drop into both eyes 2 times daily 1 Bottle 3     Allergies   Allergen Reactions     Penicillins      Amoxicillin Rash     Recent Labs   Lab Test 07/15/19  1146 11/09/18  1212 07/02/18 11/24/17  1037 03/31/17  09/11/15  1115   A1C  --   --  5.1  --  5.0  --  5.2   * 142*  --  134*  --    < > 125   HDL 59 59  --  65  --    < > 52   TRIG 82 83  --  82  --    < > 35   ALT 20 25  --  23  --    < >  --    CR 0.60 0.57  --  0.56  --    < >  --    GFRESTIMATED >90 >90  --  >90  --    < >  --    GFRESTBLACK >90 >90  --  >90  --    < >  --    POTASSIUM 4.0 4.3  --  3.7  --    < >  --    TSH 0.76 0.73 1.88  --  0.82   < >  --     < > = values in this interval not displayed.      BP Readings from Last 3 Encounters:   10/17/19 112/60   07/22/19 104/72   07/15/19 100/66    Wt Readings from Last 3 Encounters:   10/17/19 72.6 kg (160 lb)   07/22/19 71.3 kg (157 lb 3.2 oz)   07/15/19 71.2 kg (157 lb)           Reviewed and updated as needed this visit by Provider         Review of Systems   ROS COMP: Constitutional, HEENT, cardiovascular, pulmonary, gi and gu systems are negative, except as otherwise noted.      Objective    /60 (Cuff Size: Adult Regular)   Pulse 97   Temp 98.9  F (37.2  C) (Tympanic)   Resp 14   Ht 1.6 m (5' 3\")   Wt 72.6 kg (160 lb)   LMP 10/26/2014   SpO2 98%   BMI 28.34 kg/m    Body mass index is 28.34 kg/m .  Physical Exam   GENERAL: healthy, alert and no distress  EYES: Eyes grossly normal to inspection, PERRL and conjunctivae and sclerae normal  HENT: ear canals and TM's normal, " "nose and mouth without ulcers or lesions  NECK: no adenopathy, no asymmetry, masses, or scars and thyroid normal to palpation  RESP: lungs clear to auscultation - no rales, rhonchi or wheezes  CV: regular rate and rhythm, normal S1 S2, no S3 or S4, no murmur, click or rub, no peripheral edema and peripheral pulses strong  ABDOMEN: soft, nontender, no hepatosplenomegaly, no masses and bowel sounds normal  MS: no gross musculoskeletal defects noted, no edema            Assessment & Plan       ICD-10-CM    1. Colon cancer screening Z12.11 GASTROENTEROLOGY ADULT REF PROCEDURE ONLY None   2. Seasonal allergic rhinitis, unspecified trigger J30.2 montelukast (SINGULAIR) 10 MG tablet   3. Allergic conjunctivitis, bilateral H10.13 olopatadine (PATANOL) 0.1 % ophthalmic solution      has been worsening allergic sx of conjunctivitis and rhinitis, will have her to try Patanol and Singulair for next 3 months, and consider referral to allergy specialist for further evaluation if not improving   BMI:   Estimated body mass index is 28.34 kg/m  as calculated from the following:    Height as of this encounter: 1.6 m (5' 3\").    Weight as of this encounter: 72.6 kg (160 lb).             No follow-ups on file.    Tim Hill MD  [facility] [place]        "

## 2019-10-22 ENCOUNTER — TELEPHONE (OUTPATIENT)
Dept: FAMILY MEDICINE | Facility: CLINIC | Age: 49
End: 2019-10-22

## 2019-10-22 DIAGNOSIS — Z12.11 SCREEN FOR COLON CANCER: Primary | ICD-10-CM

## 2019-10-22 NOTE — TELEPHONE ENCOUNTER
Reason for Call: Request for an order or referral:    Order or referral being requested: colonoscopy    Date needed: as soon as possible    Has the patient been seen by the PCP for this problem? YES    Additional comments: Shital called and stated she needs a referral for a colonoscopy to see Dr. Shannon Noel at [facility]. She stated their number is [phone].    Phone number Patient can be reached at:  Cell number on file:    Telephone Information:   Mobile [phone]       Best Time:  any    Can we leave a detailed message on this number?  YES    Call taken on 10/22/2019 at 4:18 PM by Aislinn Wild

## 2019-10-23 ENCOUNTER — TRANSFERRED RECORDS (OUTPATIENT)
Dept: HEALTH INFORMATION MANAGEMENT | Facility: CLINIC | Age: 49
End: 2019-10-23

## 2019-10-23 ENCOUNTER — TELEPHONE (OUTPATIENT)
Dept: FAMILY MEDICINE | Facility: CLINIC | Age: 49
End: 2019-10-23

## 2019-10-23 NOTE — TELEPHONE ENCOUNTER
Reason for Call: Request for an order or referral:    Order or referral being requested: Colonoscopy     Date needed: as soon as possible    Has the patient been seen by the PCP for this problem? YES    Additional comments: Patient is calling because she called the [facility] to schedule her colonoscopy but she says that the hospital said that there is nothing in there and that the clinic had to call them. I did inform her that the orders are in chart for her colonoscopy     Phone number Patient can be reached at:  Home number on file [phone] (home)    Best Time:  Anytime     Can we leave a detailed message on this number?  YES    Call taken on 10/23/2019 at 3:48 PM by Alanna Whitmore

## 2019-10-23 NOTE — TELEPHONE ENCOUNTER
Called patient back, orders are in for colonoscopy procedure, but patient states she called the wrong number. Gave number to call below.    Tray Krause [phone].    Advised if patient is not able to get scheduled or they cannot find the order to call the clinic back. Patient verbalized understanding and agrees with plan.     Sania Santiago RN, BSN  [facility] [place]

## 2019-11-27 ENCOUNTER — HOSPITAL ENCOUNTER (OUTPATIENT)
Facility: CLINIC | Age: 49
Discharge: HOME | End: 2019-11-27
Attending: COLON & RECTAL SURGERY | Admitting: COLON & RECTAL SURGERY
Payer: COMMERCIAL

## 2019-11-27 VITALS
DIASTOLIC BLOOD PRESSURE: 78 MMHG | BODY MASS INDEX: 28 KG/M2 | OXYGEN SATURATION: 97 % | HEART RATE: 70 BPM | HEIGHT: 63 IN | RESPIRATION RATE: 27 BRPM | SYSTOLIC BLOOD PRESSURE: 108 MMHG | WEIGHT: 158 LBS

## 2019-11-27 LAB — COLONOSCOPY: NORMAL

## 2019-11-27 PROCEDURE — G0121 COLON CA SCRN NOT HI RSK IND: HCPCS | Performed by: COLON & RECTAL SURGERY

## 2019-11-27 PROCEDURE — G0500 MOD SEDAT ENDO SERVICE >5YRS: HCPCS | Performed by: COLON & RECTAL SURGERY

## 2019-11-27 PROCEDURE — 25000128 H RX IP 250 OP 636: Performed by: COLON & RECTAL SURGERY

## 2019-11-27 PROCEDURE — 45378 DIAGNOSTIC COLONOSCOPY: CPT | Performed by: COLON & RECTAL SURGERY

## 2019-11-27 PROCEDURE — 99153 MOD SED SAME PHYS/QHP EA: CPT

## 2019-11-27 PROCEDURE — 25800030 ZZH RX IP 258 OP 636: Performed by: COLON & RECTAL SURGERY

## 2019-11-27 RX ORDER — ONDANSETRON 2 MG/ML
4 INJECTION INTRAMUSCULAR; INTRAVENOUS EVERY 6 HOURS PRN
Status: DISCONTINUED | OUTPATIENT
Start: 2019-11-27 | End: 2019-11-27 | Stop reason: HOSPADM

## 2019-11-27 RX ORDER — NALOXONE HYDROCHLORIDE 0.4 MG/ML
.1-.4 INJECTION, SOLUTION INTRAMUSCULAR; INTRAVENOUS; SUBCUTANEOUS
Status: DISCONTINUED | OUTPATIENT
Start: 2019-11-27 | End: 2019-11-27 | Stop reason: HOSPADM

## 2019-11-27 RX ORDER — FENTANYL CITRATE 50 UG/ML
INJECTION, SOLUTION INTRAMUSCULAR; INTRAVENOUS PRN
Status: DISCONTINUED | OUTPATIENT
Start: 2019-11-27 | End: 2019-11-27 | Stop reason: HOSPADM

## 2019-11-27 RX ORDER — SODIUM CHLORIDE 9 MG/ML
INJECTION, SOLUTION INTRAVENOUS CONTINUOUS PRN
Status: DISCONTINUED | OUTPATIENT
Start: 2019-11-27 | End: 2019-11-27 | Stop reason: HOSPADM

## 2019-11-27 RX ORDER — ONDANSETRON 4 MG/1
4 TABLET, ORALLY DISINTEGRATING ORAL EVERY 6 HOURS PRN
Status: DISCONTINUED | OUTPATIENT
Start: 2019-11-27 | End: 2019-11-27 | Stop reason: HOSPADM

## 2019-11-27 RX ORDER — LIDOCAINE 40 MG/G
CREAM TOPICAL
Status: DISCONTINUED | OUTPATIENT
Start: 2019-11-27 | End: 2019-11-27 | Stop reason: HOSPADM

## 2019-11-27 RX ORDER — ONDANSETRON 2 MG/ML
4 INJECTION INTRAMUSCULAR; INTRAVENOUS
Status: DISCONTINUED | OUTPATIENT
Start: 2019-11-27 | End: 2019-11-27 | Stop reason: HOSPADM

## 2019-11-27 RX ORDER — FLUMAZENIL 0.1 MG/ML
0.2 INJECTION, SOLUTION INTRAVENOUS
Status: DISCONTINUED | OUTPATIENT
Start: 2019-11-27 | End: 2019-11-27 | Stop reason: HOSPADM

## 2019-11-27 ASSESSMENT — MIFFLIN-ST. JEOR: SCORE: 1305.81

## 2019-11-27 NOTE — BRIEF OP NOTE
[facility]    Brief Operative Note    Pre-operative diagnosis: Special screening for malignant neoplasms, colon [Z12.11]  Post-operative diagnosis normal colon    Procedure: Procedure(s):  COLONOSCOPY  Surgeon: Surgeon(s) and Role:     * Shannon Noel MD - Primary  Anesthesia: Conscious Sedation   Estimated blood loss: None  Drains: None  Specimens: * No specimens in log *  Findings:   See Provation procedure note in Epic    Complications: None.  Implants: * No implants in log *

## 2019-11-27 NOTE — H&P
Pre-Endoscopy History and Physical     Shital Parrish MRN# [id number]   YOB: 1970 Age: 49 year old     Date of Procedure: 11/27/2019  Primary care provider: Tim Hill  Type of Endoscopy: colonoscopy  Reason for Procedure: screening  Type of Anesthesia Anticipated: moderate sedation    HPI:    Shital is a 49 year old female who will be undergoing the above procedure.  Patient uses fiber supplement and Miralax to manage constipation; she denies bleeding.      A history and physical has been performed. The patient's medications and allergies have been reviewed. The risks and benefits of the procedure and the sedation options and risks were discussed with the patient.  All questions were answered and informed consent was obtained.      She denies a personal or family history of anesthesia complications or bleeding disorders.   Prior to Admission medications    Medication Sig Start Date End Date Taking? Authorizing Provider   estradiol (VIVELLE-DOT) 0.1 MG/24HR bi-weekly patch Place 1 patch onto the skin twice a week 7/15/19   Shauna Arceo MD   levocetirizine (XYZAL) 5 MG tablet Take 5 mg by mouth every evening    Reported, Patient   METHIMAZOLE PO     Reported, Patient   montelukast (SINGULAIR) 10 MG tablet Take 1 tablet (10 mg) by mouth At Bedtime 10/17/19   Tim Hill MD   olopatadine (PATANOL) 0.1 % ophthalmic solution Place 1 drop into both eyes 2 times daily 10/17/19   Tim Hill MD       Allergies   Allergen Reactions     Penicillins      Amoxicillin Rash        No current facility-administered medications for this encounter.        Patient Active Problem List   Diagnosis     CARDIOVASCULAR SCREENING; LDL GOAL LESS THAN 160     Hyperthyroidism     S/P abdominal supracervical subtotal hysterectomy     Elevated fasting glucose        Past Medical History:   Diagnosis Date     Edema 10/24/2013     Gastro-oesophageal reflux disease      Heavy periods 10/24/2013     Hyperthyroidism 9/26/2014     borderline-treated with a med for 6 months in [facility] and then numbers improved. had a low TSH here and referred to [facility]. numbers there were borderline but normal and no antibodies. rec. -I123 uptake was increased so Graves dz dx'd. may do methimazole if repeat TFTs show hyperthyroid     Leiomyoma of uterus, unspecified 2014     Nipple discharge     right side only. neg mammo and right breast u/s. MRI pending     PONV (postoperative nausea and vomiting)      Varicose veins of lower extremities with other complications 10/24/2013        Past Surgical History:   Procedure Laterality Date     APPENDECTOMY        SECTION       CHOLECYSTECTOMY, LAPOROSCOPIC  2012    Cholecystectomy, Laparoscopic     COLONOSCOPY N/A 2017    Procedure: COLONOSCOPY;  COLONOSCOPY;  Surgeon: Shannon Noel MD;  Location:  GI     HYSTERECTOMY SUPRACERVICAL N/A 2014    Procedure: HYSTERECTOMY SUPRACERVICAL;  Surgeon: Shauna Arceo MD;  [facility]. started as laparoscopic but converted to open due to adhesions     LAPAROSCOPIC CYSTECTOMY OVARIAN (ONCOLOGY)      right. benign cyst. partial oopherectomy in [facility]     LAPAROSCOPIC SALPINGO-OOPHORECTOMY  14     MYOMECTOMY UTERUS  2008    hysteroscopic     TONSILLECTOMY         Social History     Tobacco Use     Smoking status: Former Smoker     Smokeless tobacco: Never Used     Tobacco comment: Quit 31+ years ago.   Substance Use Topics     Alcohol use: Yes     Alcohol/week: 0.0 standard drinks     Comment: Socially        Family History   Problem Relation Age of Onset     Diabetes Mother      Cancer Mother         melanoma     Colon Polyps Mother      Diabetes Maternal Grandmother      Breast Cancer Maternal Aunt      Thyroid Disease Cousin      Asthma No family hx of        REVIEW OF SYSTEMS:     5 point ROS negative except as noted above in HPI, including Gen., Resp., CV, GI &  system  "review.      PHYSICAL EXAM:   [facility] 10/26/2014  Estimated body mass index is 28.34 kg/m  as calculated from the following:    Height as of 10/17/19: 1.6 m (5' 3\").    Weight as of 10/17/19: 72.6 kg (160 lb).   GENERAL APPEARANCE: healthy  MENTAL STATUS: alert  AIRWAY EXAM: Mallampatti Class II (visualization of the soft palate, fauces, and uvula)  RESP: lungs clear to auscultation - no rales, rhonchi or wheezes  CV: regular rates and rhythm      DIAGNOSTICS:    Not indicated      IMPRESSION   ASA Class 2 - Mild systemic disease        PLAN:       Colonoscopy with possible polypectomy, possible biopsy. The indications, procedure and risks were explained to the patient who agrees to proceed.       The above has been forwarded to the consulting provider.      Signed Electronically by: Shannon Noel MD  November 27, 2019          "

## 2019-12-02 ENCOUNTER — TELEPHONE (OUTPATIENT)
Dept: FAMILY MEDICINE | Facility: CLINIC | Age: 49
End: 2019-12-02

## 2019-12-02 NOTE — TELEPHONE ENCOUNTER
Central Prior Authorization Team   Phone: [phone]      Prior Authorization Not Needed per Insurance    12/02/2019  Medication: olopatadine (PATANOL) 0.1 % ophthalmic solution - NOT NEEDED  Insurance Company: [email or web address] ([employer]) - Phone [phone] Fax [phone]  Expected CoPay:      Pharmacy Filling the Rx: [facility] [id number] [place] [address] [address]  Pharmacy Notified: Yes  Patient Notified: No    Pharmacist states it is a refill too soon; pt picked up last Rx on 11/11/2019.  Notified pharmacy to call help desk number below if there are any issues.

## 2019-12-02 NOTE — TELEPHONE ENCOUNTER
Prior Authorization Retail Medication Request    Medication/Dose: olopatadine (PATANOL) 0.1 % ophthalmic solution  ICD code (if different than what is on RX):    Previously Tried and Failed:    Rationale:      Insurance Name:    Insurance ID:        Pharmacy Information (if different than what is on RX)  Name:    Phone:

## 2020-05-12 DIAGNOSIS — N95.1 SYMPTOMS, SUCH AS FLUSHING, SLEEPLESSNESS, HEADACHE, LACK OF CONCENTRATION, ASSOCIATED WITH THE MENOPAUSE: ICD-10-CM

## 2020-05-12 RX ORDER — ESTRADIOL 0.1 MG/D
PATCH, EXTENDED RELEASE TRANSDERMAL
Qty: 24 PATCH | Refills: 0 | Status: SHIPPED | OUTPATIENT
Start: 2020-05-12 | End: 2020-09-11

## 2020-05-12 RX ORDER — ESTRADIOL 0.1 MG/D
PATCH, EXTENDED RELEASE TRANSDERMAL
Qty: 24 PATCH | Refills: 3 | OUTPATIENT
Start: 2020-05-12

## 2020-05-12 NOTE — TELEPHONE ENCOUNTER
"Requested Prescriptions   Pending Prescriptions Disp Refills     VIVELLE-DOT 0.1 MG/24HR bi-weekly patch [Pharmacy Med Name: VIVELLE-DOT 0.1 MG PATCH] 24 patch 3     Sig: APPLY 1 PATCH TWICE A WEEK       Hormone Replacement Therapy Passed - 5/12/2020 12:10 AM        Passed - Blood pressure under 140/90 in past 12 months     BP Readings from Last 3 Encounters:   11/27/19 108/78   10/17/19 112/60   07/22/19 104/72                 Passed - Recent (12 mo) or future (30 days) visit within the authorizing provider's specialty     Patient has had an office visit with the authorizing provider or a provider within the authorizing providers department within the previous 12 mos or has a future within next 30 days. See \"Patient Info\" tab in inbasket, or \"Choose Columns\" in Meds & Orders section of the refill encounter.              Passed - Patient has mammogram in past 2 years on file if age 50-75        Passed - Medication is active on med list        Passed - Patient is 18 years of age or older        Passed - No active pregnancy on record        Passed - No positive pregnancy test on record in past 12 months           Refills available  Bettye Santizo RN on 5/12/2020 at 8:34 AM    "

## 2020-05-12 NOTE — TELEPHONE ENCOUNTER
"Requested Prescriptions   Pending Prescriptions Disp Refills     VIVELLE-DOT 0.1 MG/24HR bi-weekly patch [Pharmacy Med Name: VIVELLE-DOT 0.1 MG PATCH] 24 patch 3     Sig: APPLY 1 PATCH TWICE A WEEK       Hormone Replacement Therapy Passed - 5/12/2020  9:25 AM        Passed - Blood pressure under 140/90 in past 12 months     BP Readings from Last 3 Encounters:   11/27/19 108/78   10/17/19 112/60   07/22/19 104/72                 Passed - Recent (12 mo) or future (30 days) visit within the authorizing provider's specialty     Patient has had an office visit with the authorizing provider or a provider within the authorizing providers department within the previous 12 mos or has a future within next 30 days. See \"Patient Info\" tab in inbasket, or \"Choose Columns\" in Meds & Orders section of the refill encounter.              Passed - Patient has mammogram in past 2 years on file if age 50-75        Passed - Medication is active on med list        Passed - Patient is 18 years of age or older        Passed - No active pregnancy on record        Passed - No positive pregnancy test on record in past 12 months           Last Written Prescription Date:  7/15/19  Last Fill Quantity: 24,  # refills: 3   Last office visit: 7/15/2019 with prescribing provider:  Dr Arceo   Future Office Visit:  None    Refill sent, needed early refill, had some patches that fell off that she had to replace  Bettye Santizo RN on 5/12/2020 at 12:15 PM        "

## 2020-05-19 DIAGNOSIS — H10.13 ALLERGIC CONJUNCTIVITIS, BILATERAL: ICD-10-CM

## 2020-05-19 RX ORDER — OLOPATADINE HYDROCHLORIDE 1 MG/ML
SOLUTION/ DROPS OPHTHALMIC
Qty: 5 ML | Refills: 3 | Status: SHIPPED | OUTPATIENT
Start: 2020-05-19 | End: 2020-10-16

## 2020-05-20 NOTE — TELEPHONE ENCOUNTER
Prescription approved per [facility] Refill Protocol.  Juliane Curtis RN

## 2020-08-19 ENCOUNTER — ANCILLARY PROCEDURE (OUTPATIENT)
Dept: MAMMOGRAPHY | Facility: CLINIC | Age: 50
End: 2020-08-19
Payer: COMMERCIAL

## 2020-08-19 DIAGNOSIS — Z12.31 VISIT FOR SCREENING MAMMOGRAM: ICD-10-CM

## 2020-08-19 PROCEDURE — 77067 SCR MAMMO BI INCL CAD: CPT | Mod: TC

## 2020-08-19 PROCEDURE — 77063 BREAST TOMOSYNTHESIS BI: CPT | Mod: TC

## 2020-09-11 ENCOUNTER — OFFICE VISIT (OUTPATIENT)
Dept: OBGYN | Facility: CLINIC | Age: 50
End: 2020-09-11
Payer: COMMERCIAL

## 2020-09-11 VITALS
HEART RATE: 78 BPM | WEIGHT: 167 LBS | SYSTOLIC BLOOD PRESSURE: 102 MMHG | HEIGHT: 64 IN | BODY MASS INDEX: 28.51 KG/M2 | DIASTOLIC BLOOD PRESSURE: 72 MMHG

## 2020-09-11 DIAGNOSIS — Z79.890 POST-MENOPAUSE ON HRT (HORMONE REPLACEMENT THERAPY): ICD-10-CM

## 2020-09-11 DIAGNOSIS — Z13.228 SCREENING FOR METABOLIC DISORDER: ICD-10-CM

## 2020-09-11 DIAGNOSIS — Z23 NEED FOR VACCINATION: ICD-10-CM

## 2020-09-11 DIAGNOSIS — Z13.6 SCREENING FOR CARDIOVASCULAR CONDITION: ICD-10-CM

## 2020-09-11 DIAGNOSIS — N95.1 SYMPTOMS, SUCH AS FLUSHING, SLEEPLESSNESS, HEADACHE, LACK OF CONCENTRATION, ASSOCIATED WITH THE MENOPAUSE: ICD-10-CM

## 2020-09-11 DIAGNOSIS — Z13.0 SCREENING FOR DISORDER OF BLOOD AND BLOOD-FORMING ORGANS: ICD-10-CM

## 2020-09-11 DIAGNOSIS — Z01.419 ENCOUNTER FOR GYNECOLOGICAL EXAMINATION WITHOUT ABNORMAL FINDING: Primary | ICD-10-CM

## 2020-09-11 DIAGNOSIS — Z23 NEED FOR PROPHYLACTIC VACCINATION AND INOCULATION AGAINST INFLUENZA: ICD-10-CM

## 2020-09-11 DIAGNOSIS — Z13.21 ENCOUNTER FOR VITAMIN DEFICIENCY SCREENING: ICD-10-CM

## 2020-09-11 PROCEDURE — 90682 RIV4 VACC RECOMBINANT DNA IM: CPT | Performed by: OBSTETRICS & GYNECOLOGY

## 2020-09-11 PROCEDURE — 99396 PREV VISIT EST AGE 40-64: CPT | Mod: 25 | Performed by: OBSTETRICS & GYNECOLOGY

## 2020-09-11 PROCEDURE — 90472 IMMUNIZATION ADMIN EACH ADD: CPT | Performed by: OBSTETRICS & GYNECOLOGY

## 2020-09-11 PROCEDURE — 90471 IMMUNIZATION ADMIN: CPT | Performed by: OBSTETRICS & GYNECOLOGY

## 2020-09-11 PROCEDURE — 90750 HZV VACC RECOMBINANT IM: CPT | Performed by: OBSTETRICS & GYNECOLOGY

## 2020-09-11 RX ORDER — LIFITEGRAST 50 MG/ML
5 SOLUTION/ DROPS OPHTHALMIC PRN
COMMUNITY
Start: 2020-08-16 | End: 2022-08-10

## 2020-09-11 RX ORDER — ADAPALENE AND BENZOYL PEROXIDE 3; 25 MG/G; MG/G
0.3 GEL TOPICAL PRN
COMMUNITY
Start: 2020-08-24

## 2020-09-11 RX ORDER — ESTRADIOL 0.1 MG/D
1 FILM, EXTENDED RELEASE TRANSDERMAL
Qty: 24 PATCH | Refills: 3 | Status: SHIPPED | OUTPATIENT
Start: 2020-09-14 | End: 2021-06-23

## 2020-09-11 RX ORDER — CICLOPIROX 80 MG/ML
8 SOLUTION TOPICAL PRN
COMMUNITY
Start: 2020-07-29 | End: 2024-02-23

## 2020-09-11 ASSESSMENT — PATIENT HEALTH QUESTIONNAIRE - PHQ9
SUM OF ALL RESPONSES TO PHQ QUESTIONS 1-9: 0
5. POOR APPETITE OR OVEREATING: NOT AT ALL

## 2020-09-11 ASSESSMENT — ANXIETY QUESTIONNAIRES
IF YOU CHECKED OFF ANY PROBLEMS ON THIS QUESTIONNAIRE, HOW DIFFICULT HAVE THESE PROBLEMS MADE IT FOR YOU TO DO YOUR WORK, TAKE CARE OF THINGS AT HOME, OR GET ALONG WITH OTHER PEOPLE: NOT DIFFICULT AT ALL
5. BEING SO RESTLESS THAT IT IS HARD TO SIT STILL: NOT AT ALL
2. NOT BEING ABLE TO STOP OR CONTROL WORRYING: NOT AT ALL
3. WORRYING TOO MUCH ABOUT DIFFERENT THINGS: NOT AT ALL
6. BECOMING EASILY ANNOYED OR IRRITABLE: NOT AT ALL
7. FEELING AFRAID AS IF SOMETHING AWFUL MIGHT HAPPEN: NOT AT ALL
GAD7 TOTAL SCORE: 0
1. FEELING NERVOUS, ANXIOUS, OR ON EDGE: NOT AT ALL

## 2020-09-11 ASSESSMENT — MIFFLIN-ST. JEOR: SCORE: 1365.26

## 2020-09-11 NOTE — PROGRESS NOTES
Shital is a 49 year old  female who presents for annual exam.     Besides routine health maintenance, she has no other health concerns today .    HPI:  The patient's PCP is Tim Hill MD.      Patient is doing well. 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 Really stressful, working much harder and longer than she ever did before. 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 Not sure how long it will last and they'll be in school.     working from home. Doing ok with being home for months with him. Now myrto living with them. Set some boundaries but going much better this time than when she was a teenager. Wants her to have a job though and pay bills and take responsibility.  vega is stuck in [place]. Haven't seen her since January and not sure when they'll get to.    Complete control of vasomotor sx with her vivelle 0.1mg. wondering how long to be on it and what dose and what plan is    Due for colonoscopy.     Not fasting for labs but would like to return for fasting labs in the next few months. Had a few labs done 10/19 outside of here. Endo still following her hyperthyroid but it's stable.      GYNECOLOGIC HISTORY:    Patient's last menstrual period was 10/26/2014.    Her current contraception method is: hysterectomy  She  reports that she has quit smoking. She has never used smokeless tobacco.    Patient is sexually active.  STD testing offered?  Declined  Last PHQ-9 score on record =   PHQ-9 SCORE 2020   PHQ-9 Total Score 0     Last GAD7 score on record =   RACHEL-7 SCORE 2020   Total Score 0     Alcohol Score = 1    HEALTH MAINTENANCE:  Cholesterol:   Cholesterol   Date Value Ref Range Status   07/15/2019 218 (H) <200 mg/dL Final     Comment:     Desirable:       <200 mg/dl   2018 218 (H) <200 mg/dL Final     Comment:     Desirable:       <200 mg/dl      Last Mammo:  2020, Result: Normal, Next Mammo: one year   Pap:   Lab Results   Component Value Date    PAP NIL 07/15/2019    PAP NIL 10/23/2012      Colonoscopy: 19, Result: WNL, Next Colonoscopy: 2024  Dexa:  NA    Health maintenance updated:  no    HISTORY:  OB History    Para Term  AB Living   6 2 2 0 4 2   SAB TAB Ectopic Multiple Live Births   2 2 0 0 2      # Outcome Date GA Lbr Rajesh/2nd Weight Sex Delivery Anes PTL Lv   6 Term 10/01/98    F CS-LTranv   ARASELI      Name: Myrto   5 Term 96    F CS-LTranv   ARASELI      Name: Nefeli   4 TAB            3 TAB            2 SAB            1 SAB                Patient Active Problem List   Diagnosis     CARDIOVASCULAR SCREENING; LDL GOAL LESS THAN 160     Hyperthyroidism     S/P abdominal supracervical subtotal hysterectomy     Elevated fasting glucose     Past Surgical History:   Procedure Laterality Date     APPENDECTOMY        SECTION       CHOLECYSTECTOMY, LAPOROSCOPIC  2012    Cholecystectomy, Laparoscopic     COLONOSCOPY N/A 2017    Procedure: COLONOSCOPY;  COLONOSCOPY;  Surgeon: Shannon Noel MD;  Location:  GI     COLONOSCOPY N/A 2019    Procedure: COLONOSCOPY;  Surgeon: Shannon Noel MD;  Location:  GI     HYSTERECTOMY SUPRACERVICAL N/A 2014    Procedure: HYSTERECTOMY SUPRACERVICAL;  Surgeon: Shauna Arceo MD;  Location:  OR [facility] and LSO done by Adis/ Leora. started as laparoscopic but converted to open due to adhesions     LAPAROSCOPIC CYSTECTOMY OVARIAN (ONCOLOGY)      right. benign cyst. partial oopherectomy in [facility]     LAPAROSCOPIC SALPINGO-OOPHORECTOMY  14     MYOMECTOMY UTERUS  2008    hysteroscopic     TONSILLECTOMY  1979      Social History     Tobacco Use     Smoking status: Former Smoker     Smokeless tobacco: Never Used     Tobacco comment: Quit 31+ years ago.   Substance Use Topics     Alcohol use: Yes     Alcohol/week: 0.0 standard drinks     Comment: Socially   "     Problem (# of Occurrences) Relation (Name,Age of Onset)    Breast Cancer (1) Maternal Aunt    Cancer (1) Mother: melanoma    Colon Polyps (1) Mother    Diabetes (2) Mother, Maternal Grandmother    No Known Problems (5) Father, Sister, Brother, Maternal Grandfather, Paternal Grandmother    Thyroid Disease (1) Cousin       Negative family history of: Asthma            Current Outpatient Medications   Medication Sig     ciclopirox (PENLAC) 8 % external solution Apply 8 Application topically as needed To nails nails     EPIDUO FORTE 0.3-2.5 % gel Apply 0.3 Pump topically as needed     [START ON 9/14/2020] estradiol (VIVELLE-DOT) 0.1 MG/24HR bi-weekly patch Place 1 patch onto the skin twice a week     levocetirizine (XYZAL) 5 MG tablet Take 5 mg by mouth every evening     METHIMAZOLE PO      olopatadine (PATANOL) 0.1 % ophthalmic solution INSTILL 1 DROP INTO BOTH EYES TWICE A DAY     montelukast (SINGULAIR) 10 MG tablet Take 1 tablet (10 mg) by mouth At Bedtime (Patient not taking: Reported on 9/11/2020)     XIIDRA 5 % opthalmic solution Apply 5 Application topically as needed     No current facility-administered medications for this visit.      Allergies   Allergen Reactions     Penicillins      Amoxicillin Rash       Past medical, surgical, social and family histories were reviewed and updated in EPIC.    ROS:   12 point review of systems negative other than symptoms noted below or in the HPI.  No urinary frequency or dysuria, bladder or kidney problems    EXAM:  /72   Pulse 78   Ht 1.63 m (5' 4.17\")   Wt 75.8 kg (167 lb)   LMP 10/26/2014   BMI 28.51 kg/m     BMI: Body mass index is 28.51 kg/m .    PHYSICAL EXAM:  Constitutional:   Appearance: Well nourished, well developed, alert, in no acute distress  Neck:  Lymph Nodes:  No lymphadenopathy present    Thyroid:  Gland size normal, nontender, no nodules or masses present  on palpation  Chest:  Respiratory Effort:  Breathing " unlabored  Cardiovascular:    Heart: Auscultation:  Regular rate, normal rhythm, no murmurs present  Breasts: Axillary Lymph Nodes:  No lymphadenopathy present. and SKIN INSPECTION NORMAL, SYMMETRIC, NO PUCKERING. MORE F.C NODULARITY IN THE UPPER/MID OUTER LEFT BREAST COMPARED TO RIGHT BUT NOT CONCERNING  Gastrointestinal:   Abdominal Examination:  Abdomen nontender to palpation, tone normal without rigidity or guarding, no masses present, umbilicus without lesions   Liver and Spleen:  No hepatomegaly present, liver nontender to palpation    Hernias:  No hernias present  Lymphatic: Lymph Nodes:  No other lymphadenopathy present  Skin:  General Inspection:  No rashes present, no lesions present, no areas of  discoloration  Neurologic:    Mental Status:  Oriented X3.  Normal strength and tone, sensory exam                grossly normal, mentation intact and speech normal.    Psychiatric:   Mentation appears normal and affect normal/bright.         Pelvic Exam:  External Genitalia:     Normal appearance for age, no discharge present, no tenderness present, no inflammatory lesions present, color normal  Vagina:     Normal vaginal vault without central or paravaginal defects, no discharge present, no inflammatory lesions present, no masses present  Bladder:     Nontender to palpation  Urethra:   Urethral Body:  Urethra palpation normal, urethra structural support normal   Urethral Meatus:  No erythema or lesions present  Cervix:     Appearance healthy, no lesions present, nontender to palpation, no bleeding present  Uterus:     Surgically absent  Adnexa:     No adnexal tenderness present, no adnexal masses present  Perineum:     Perineum within normal limits, no evidence of trauma, no rashes or skin lesions present  Anus:     Anus within normal limits, no hemorrhoids present  Inguinal Lymph Nodes:     No lymphadenopathy present  Pubic Hair:     Normal pubic hair distribution for age  Genitalia and Groin:     No rashes  present, no lesions present, no areas of discoloration, no masses present      COUNSELING:   Reviewed preventive health counseling, as reflected in patient instructions  Special attention given to:        Colon cancer screening    BMI: Body mass index is 28.51 kg/m .  Weight management plan: Discussed healthy diet and exercise guidelines    ASSESSMENT:  49 year old female with satisfactory annual exam.    ICD-10-CM    1. Encounter for gynecological examination without abnormal finding  Z01.419    2. Symptoms, such as flushing, sleeplessness, headache, lack of concentration, associated with the menopause  N95.1 estradiol (VIVELLE-DOT) 0.1 MG/24HR bi-weekly patch   3. Post-menopause on HRT (hormone replacement therapy)  Z79.890 estradiol (VIVELLE-DOT) 0.1 MG/24HR bi-weekly patch   4. Need for prophylactic vaccination and inoculation against influenza  Z23 INFLUENZA QUAD, RECOMBINANT, P-FREE (RIV4) (FLUBLOCK) [79201]     Vaccine Administration, Initial [15323]     Vaccine Administration, Each Additional [61268]   5. Need for vaccination  Z23 SHINGRIX [20159]   6. Screening for cardiovascular condition  Z13.6 Lipid panel reflex to direct LDL Fasting   7. Screening for metabolic disorder  Z13.228 Comprehensive metabolic panel   8. Encounter for vitamin deficiency screening  Z13.21 Vitamin D Deficiency   9. Screening for disorder of blood and blood-forming organs  Z13.0 CBC with platelets       PLAN:  Pap is UTD for another 3-4 yrs  mammo in august and it was normal  Flu shot and shingrix given  Will return for full fasting labs and D, CBC. Thyroid deferred to Dr. Carrasco  Continue vivelle patch this year for this dose as she is still only 50 yrs old. Next year would be almost 51 and then would consider drop to 0,075mg and then slowly try to wean down over time and see how she tolerates it. Patient is agreeable to this. Reviewed pros/cons and r/b/a/ in terms of breast cancer, VTE, etc      Shauna Arceo MD

## 2020-09-12 ASSESSMENT — ANXIETY QUESTIONNAIRES: GAD7 TOTAL SCORE: 0

## 2020-09-25 DIAGNOSIS — E78.5 HYPERLIPIDEMIA LDL GOAL <130: Primary | ICD-10-CM

## 2020-09-25 DIAGNOSIS — Z13.21 ENCOUNTER FOR VITAMIN DEFICIENCY SCREENING: ICD-10-CM

## 2020-09-25 DIAGNOSIS — Z13.228 SCREENING FOR METABOLIC DISORDER: ICD-10-CM

## 2020-09-25 DIAGNOSIS — Z13.0 SCREENING FOR DISORDER OF BLOOD AND BLOOD-FORMING ORGANS: ICD-10-CM

## 2020-09-25 DIAGNOSIS — Z13.6 SCREENING FOR CARDIOVASCULAR CONDITION: ICD-10-CM

## 2020-09-25 LAB
ALBUMIN SERPL-MCNC: 3.7 G/DL (ref 3.4–5)
ALP SERPL-CCNC: 38 U/L (ref 40–150)
ALT SERPL W P-5'-P-CCNC: 24 U/L (ref 0–50)
ANION GAP SERPL CALCULATED.3IONS-SCNC: 6 MMOL/L (ref 3–14)
AST SERPL W P-5'-P-CCNC: 17 U/L (ref 0–45)
BILIRUB SERPL-MCNC: 0.4 MG/DL (ref 0.2–1.3)
BUN SERPL-MCNC: 15 MG/DL (ref 7–30)
CALCIUM SERPL-MCNC: 9.3 MG/DL (ref 8.5–10.1)
CHLORIDE SERPL-SCNC: 103 MMOL/L (ref 94–109)
CHOLEST SERPL-MCNC: 280 MG/DL
CO2 SERPL-SCNC: 26 MMOL/L (ref 20–32)
CREAT SERPL-MCNC: 0.67 MG/DL (ref 0.52–1.04)
DEPRECATED CALCIDIOL+CALCIFEROL SERPL-MC: 47 UG/L (ref 20–75)
ERYTHROCYTE [DISTWIDTH] IN BLOOD BY AUTOMATED COUNT: 11.9 % (ref 10–15)
GFR SERPL CREATININE-BSD FRML MDRD: >90 ML/MIN/{1.73_M2}
GLUCOSE SERPL-MCNC: 93 MG/DL (ref 70–99)
HCT VFR BLD AUTO: 39.9 % (ref 35–47)
HDLC SERPL-MCNC: 57 MG/DL
HGB BLD-MCNC: 14 G/DL (ref 11.7–15.7)
LDLC SERPL CALC-MCNC: 205 MG/DL
MCH RBC QN AUTO: 31 PG (ref 26.5–33)
MCHC RBC AUTO-ENTMCNC: 35.1 G/DL (ref 31.5–36.5)
MCV RBC AUTO: 89 FL (ref 78–100)
NONHDLC SERPL-MCNC: 223 MG/DL
PLATELET # BLD AUTO: 311 10E9/L (ref 150–450)
POTASSIUM SERPL-SCNC: 4.3 MMOL/L (ref 3.4–5.3)
PROT SERPL-MCNC: 7.5 G/DL (ref 6.8–8.8)
RBC # BLD AUTO: 4.51 10E12/L (ref 3.8–5.2)
SODIUM SERPL-SCNC: 135 MMOL/L (ref 133–144)
TRIGL SERPL-MCNC: 89 MG/DL
WBC # BLD AUTO: 5.4 10E9/L (ref 4–11)

## 2020-09-25 PROCEDURE — 85027 COMPLETE CBC AUTOMATED: CPT | Performed by: OBSTETRICS & GYNECOLOGY

## 2020-09-25 PROCEDURE — 80061 LIPID PANEL: CPT | Performed by: OBSTETRICS & GYNECOLOGY

## 2020-09-25 PROCEDURE — 36415 COLL VENOUS BLD VENIPUNCTURE: CPT | Performed by: OBSTETRICS & GYNECOLOGY

## 2020-09-25 PROCEDURE — 80053 COMPREHEN METABOLIC PANEL: CPT | Performed by: OBSTETRICS & GYNECOLOGY

## 2020-09-25 PROCEDURE — 82306 VITAMIN D 25 HYDROXY: CPT | Performed by: OBSTETRICS & GYNECOLOGY

## 2020-09-25 RX ORDER — ATORVASTATIN CALCIUM 20 MG/1
20 TABLET, FILM COATED ORAL DAILY
Qty: 90 TABLET | Refills: 1 | Status: SHIPPED | OUTPATIENT
Start: 2020-09-25 | End: 2021-12-20

## 2020-10-05 ENCOUNTER — VIRTUAL VISIT (OUTPATIENT)
Dept: CARDIOLOGY | Facility: CLINIC | Age: 50
End: 2020-10-05
Attending: INTERNAL MEDICINE
Payer: COMMERCIAL

## 2020-10-05 DIAGNOSIS — Z13.6 CARDIOVASCULAR SCREENING; LDL GOAL LESS THAN 160: Primary | ICD-10-CM

## 2020-10-05 DIAGNOSIS — Z82.49 FAMILY HISTORY OF ISCHEMIC HEART DISEASE: ICD-10-CM

## 2020-10-05 DIAGNOSIS — E78.5 HYPERLIPIDEMIA LDL GOAL <100: ICD-10-CM

## 2020-10-05 PROCEDURE — 99213 OFFICE O/P EST LOW 20 MIN: CPT | Mod: 95 | Performed by: INTERNAL MEDICINE

## 2020-10-05 NOTE — LETTER
"10/5/2020      RE: Shital Parrish  [address]  [place] [address]       Dear Colleague,    Thank you for the opportunity to participate in the care of your patient, Shital Parrish, at the [facility] HEART [facility]. Please see a copy of my visit note below.    Ms Shital Parrish, the patient has been notified of following:      \"This video visit will be conducted via a call between you and your physician/provider. We have found that certain health care needs can be provided without the need for an in-person physical exam.  This service lets us provide the care you need with a video conversation.  If a prescription is necessary we can send it directly to your pharmacy.  If lab work is needed we can place an order for that and you can then stop by our lab to have the test done at a later time.     Video visits are billed at different rates depending on your insurance coverage.  Please reach out to your insurance provider with any questions.     If during the course of the call the physician/provider feels a video visit is not appropriate, you will not be charged for this service.\"     Patient has given verbal consent for Video visit? Yes  How would you like to obtain your AVS? Mail a copy  If you are dropped from the video visit, the video invite should be resent to:My Chart  Will anyone else be joining your video visit? No     Location:  Patient : home  Dr Sepulveda: office [facility]    Start: 4.00 pm  Stop: 4.30 pm      Videosystem: Sherif    HPI:     I had the privilege to evaluate and examine Ms Shital Parrish, who is 48 yr female patient with hypercholesterolemia.  Patient denies chest pain, shortness of breath and intermittent claudication.  Patient does not complaint about limitations during effort.  Patient has no palpitations.       PAST MEDICAL HISTORY:  Past Medical History:   Diagnosis Date     Edema 10/24/2013     Gastro-oesophageal reflux disease  "     Heavy periods 10/24/2013     Hyperthyroidism 2014    borderline-treated with a med for 6 months in [place] and then numbers improved. had a low TSH here and referred to [facility]. numbers there were borderline but normal and no antibodies. rec. -I123 uptake was increased so Graves dz dx'd. may do methimazole if repeat TFTs show hyperthyroid     Leiomyoma of uterus, unspecified 2014     Nipple discharge     right side only. neg mammo and right breast u/s. MRI pending     PONV (postoperative nausea and vomiting)      Varicose veins of lower extremities with other complications 10/24/2013       CURRENT MEDICATIONS:  Current Outpatient Medications   Medication Sig Dispense Refill     atorvastatin (LIPITOR) 20 MG tablet Take 1 tablet (20 mg) by mouth daily 90 tablet 1     ciclopirox (PENLAC) 8 % external solution Apply 8 Application topically as needed To nails nails       EPIDUO FORTE 0.3-2.5 % gel Apply 0.3 Pump topically as needed       estradiol (VIVELLE-DOT) 0.1 MG/24HR bi-weekly patch Place 1 patch onto the skin twice a week 24 patch 3     levocetirizine (XYZAL) 5 MG tablet Take 5 mg by mouth every evening       METHIMAZOLE PO        montelukast (SINGULAIR) 10 MG tablet Take 1 tablet (10 mg) by mouth At Bedtime (Patient not taking: Reported on 2020) 30 tablet 3     olopatadine (PATANOL) 0.1 % ophthalmic solution INSTILL 1 DROP INTO BOTH EYES TWICE A DAY 5 mL 11     XIIDRA 5 % opthalmic solution Apply 5 Application topically as needed         PAST SURGICAL HISTORY:  Past Surgical History:   Procedure Laterality Date     APPENDECTOMY        SECTION       CHOLECYSTECTOMY, LAPOROSCOPIC  2012    Cholecystectomy, Laparoscopic     COLONOSCOPY N/A 2017    Procedure: COLONOSCOPY;  COLONOSCOPY;  Surgeon: Shannon Noel MD;  Location:  GI     COLONOSCOPY N/A 2019    Procedure: COLONOSCOPY;  Surgeon: Shannon Noel MD;  Location:  GI     HYSTERECTOMY  SUPRACERVICAL N/A 11/11/2014    Procedure: HYSTERECTOMY SUPRACERVICAL;  Surgeon: Shauna Arceo MD;  Location: [facility] and LSO done by Adis/ Leora. started as laparoscopic but converted to open due to adhesions     LAPAROSCOPIC CYSTECTOMY OVARIAN (ONCOLOGY)      right. benign cyst. partial oopherectomy in [facility]     LAPAROSCOPIC SALPINGO-OOPHORECTOMY  11/11/14     MYOMECTOMY UTERUS  2008    hysteroscopic     TONSILLECTOMY  1979       ALLERGIES     Allergies   Allergen Reactions     Penicillins      Amoxicillin Rash       FAMILY HISTORY:  Family History   Problem Relation Age of Onset     Diabetes Mother      Cancer Mother         melanoma     Colon Polyps Mother      No Known Problems Father      No Known Problems Sister      Diabetes Maternal Grandmother      Breast Cancer Maternal Aunt      Thyroid Disease Cousin      No Known Problems Brother      No Known Problems Maternal Grandfather      No Known Problems Paternal Grandmother      Asthma No family hx of        SOCIAL HISTORY:  Social History     Socioeconomic History     Marital status:      Spouse name: Marianna     Number of children: 2     Years of education: 16     Highest education level: Not on file   Occupational History     Occupation: Program Leader     Comment: [employer]     Employer: [employer]    Social Needs     Financial resource strain: Not on file     Food insecurity     Worry: Not on file     Inability: Not on file     Transportation needs     Medical: Not on file     Non-medical: Not on file   Tobacco Use     Smoking status: Former Smoker     Smokeless tobacco: Never Used     Tobacco comment: Quit 31+ years ago.   Substance and Sexual Activity     Alcohol use: Yes     Alcohol/week: 0.0 standard drinks     Comment: Socially      Drug use: No     Sexual activity: Yes     Partners: Male     Birth control/protection: Female Surgical     Comment: hysterectomy   Lifestyle     Physical activity     Days per week: Not on file      Minutes per session: Not on file     Stress: Not on file   Relationships     Social connections     Talks on phone: Not on file     Gets together: Not on file     Attends Mandaeism service: Not on file     Active member of club or organization: Not on file     Attends meetings of clubs or organizations: Not on file     Relationship status: Not on file     Intimate partner violence     Fear of current or ex partner: Not on file     Emotionally abused: Not on file     Physically abused: Not on file     Forced sexual activity: Not on file   Other Topics Concern      Service Not Asked     Blood Transfusions No     Caffeine Concern Not Asked     Occupational Exposure Not Asked     Hobby Hazards Not Asked     Sleep Concern Not Asked     Stress Concern Not Asked     Weight Concern Not Asked     Special Diet No     Back Care Not Asked     Exercise No     Bike Helmet Not Asked     Comment: NA     Seat Belt Yes     Self-Exams Not Asked     Parent/sibling w/ CABG, MI or angioplasty before 65F 55M? Not Asked   Social History Narrative    The patient was born in [facility]. She eats fruits and vegetables every day. She takes calcium supplement and vitamin D.        No advanced care directives on file           ROS:   Constitutional: No fever, chills, or sweats. No weight gain/loss   ENT: No visual disturbance, ear ache, epistaxis, sore throat  Allergies/Immunologic: Negative.   Respiratory: No cough, hemoptysia  Cardiovascular: As per HPI  GI: No nausea, vomiting, hematemesis, melena, or hematochezia  : No urinary frequency, dysuria, or hematuria  Integument: Negative  Psychiatric: Negative  Neuro: Negative  Endocrinology: Negative   Musculoskeletal: Negative     Labs:  LIPID RESULTS:  Lab Results   Component Value Date    CHOL 280 (H) 09/25/2020    HDL 57 09/25/2020     (H) 09/25/2020    TRIG 89 09/25/2020    CHOLHDLRATIO 3.5 09/11/2015    NHDL 223 (H) 09/25/2020       LIVER ENZYME RESULTS:  Lab Results    Component Value Date    AST 17 09/25/2020    ALT 24 09/25/2020       CBC RESULTS:  Lab Results   Component Value Date    WBC 5.4 09/25/2020    RBC 4.51 09/25/2020    HGB 14.0 09/25/2020    HCT 39.9 09/25/2020    MCV 89 09/25/2020    MCH 31.0 09/25/2020    MCHC 35.1 09/25/2020    RDW 11.9 09/25/2020     09/25/2020       BMP RESULTS:  Lab Results   Component Value Date     09/25/2020    POTASSIUM 4.3 09/25/2020    CHLORIDE 103 09/25/2020    CO2 26 09/25/2020    ANIONGAP 6 09/25/2020    GLC 93 09/25/2020    BUN 15 09/25/2020    CR 0.67 09/25/2020    GFRESTIMATED >90 09/25/2020    GFRESTBLACK >90 09/25/2020    BUZZ 9.3 09/25/2020        A1C RESULTS:  Lab Results   Component Value Date    A1C 5.1 10/17/2019         Procedures:     CT Calcium Screening 10-15-20  IMPRESSIONS:  1.  No coronary calcifications.  2.  The total Agatston calcium score is 0 placing the patient in the  lowest percentile when compared to age and gender matched control  group.  3.  Please review Radiology report for incidental noncardiac findings  that will follow separately.      FINDINGS:     CORONARY ARTERY CALCIUM SCORES:   Total calcium score: 0  Left main coronary artery: 0  Left anterior descending coronary artery: 0  Circumflex coronary artery: 0  Right coronary artery: 0    RX reading FINDINGS:     Chest: The visualized tracheobronchial tree appears patent. Esophagus  appears unremarkable. Heart size within normal limits.. No significant  pericardial effusion.. Visualized thoracic aorta and main pulmonary  artery diameters appear within normal limits. There are no visualized  pathologically enlarged mediastinal, hilar or axillary lymph nodes. No  pleural effusion. The pleura appears unremarkable.. No pneumothorax.  No evidence of lung infection. Mild bibasilar atelectasis. No  suspicious lung nodules. Calcified granuloma in the right lower lobe.  Please see cardiology report for detailed assessment of the cardiac  findings.       Upper abdomen: Unremarkable on this non-contrast exam.     Bones/soft tissues: Limited evaluation on axial-only imaging, with  mild degenerative changes in the visualized spine. No acute osseous  abnormalities or suspicious bony lesions.                                                                      IMPRESSION:  No evidence of acute intrathoracic pathology.    Assessment and Plan:     We discussed the results with patient:  We discussed the importance of a heart healthy diet and lifestyle.   Despite CAC score is 0 - with a level of LDL-C > 205 mg/dl - I started with atorvastatin 20 mg/d.  Follow-up within 1 yr.    Larry Schaefer MD, PhD  Professor of Medicine  Division of Cardiology          CC  Patient Care Team:  Tim Hill MD as PCP - General (Family Practice)  Tim Hill MD as Assigned PCP  Shauna Arceo MD as Assigned OBGYN Provider  Larry Schaefer MD as Assigned Heart and Vascular Provider  LARRY SCHAEFER         Please do not hesitate to contact me if you have any questions/concerns.     Sincerely,     Larry Schaefer MD

## 2020-10-05 NOTE — PATIENT INSTRUCTIONS
Patient Instructions:  It was a pleasure to see you in the cardiology clinic today.      If you have any questions, you can reach my nurse, Pat GUERRA LPN, at [phone].  Press Option #1 for the [facility], and then press Option #4 for nursing.    We are encouraging the use of Knotice to communicate with your HealthCare Provider    Medication Changes: None.    Recommendations: Complete fasting labs.  These can be done at any [place] lab location.  Fasting is 10 hours prior to your draw.    Studies Ordered: CT Coronary Calcium Scan.    The results from today include: None.    Please follow up: With Dr. Sepulveda based on results of testing    Sincerely,    David Sepulveda MD     If you have an urgent need after hours (8:00 am to 4:30 pm) please call [phone] and ask for the cardiology fellow on call.        Patient Education     Understanding Coronary Calcium Scan    A coronary calcium scan is a test that looks at the arteries that supply blood to the heart muscle. These are called the coronary arteries. The scan checks for plaque buildup along the walls of these arteries. Plaque is made up of calcium, fat, cholesterol, and other substances. A buildup of plaque narrows the arteries. This affects the flow of blood to the heart muscle. If a coronary artery becomes completely blocked, a heart attack (death of part of the heart muscle) can result. Knowing how much plaque you have in your arteries can help figure out your risk for a heart attack.  Why do I need a coronary calcium scan?  A coronary calcium scan measures the amount of calcium in the arteries. The presence of calcium deposits in an artery means that plaque is starting to build up.  The results of the test are given as a calcium score. The scan can help predict your risk of having a heart attack, even before symptoms appear.  This scan isn t for everyone. It is most useful when considered along with other risk factors for a  heart attack. These include family history of heart disease, high blood pressure, and unhealthy cholesterol.  What happens during a coronary calcium scan?  The scan is done using computed tomography (CT). This test uses X-rays and computers to create detailed images of the heart.  For the test, you lie on a platform that slides into a tube. During the scan:    You will need to stay very still.    You may be asked to hold your breath for short periods of time.    You may have small sticky discs attached to wires (electrodes) on your chest to record your heartbeat.  The test will likely take about 10 minutes. Once the test is done and your appointment is finished, you can go back to your normal routine.  What are the risks of coronary calcium scan?  A CT scan exposes you to a certain amount of radiation. The benefits of the scan likely outweigh the risks for you. You and your healthcare provider can discuss this further.  Date Last Reviewed: 5/1/2016 2000-2019 [employer]. [address] [place] [address], [place] [address]. All rights reserved. This information is not intended as a substitute for professional medical care. Always follow your healthcare professional's instructions.

## 2020-10-15 ENCOUNTER — HOSPITAL ENCOUNTER (OUTPATIENT)
Dept: CT IMAGING | Facility: CLINIC | Age: 50
Discharge: HOME | End: 2020-10-15
Attending: INTERNAL MEDICINE | Admitting: INTERNAL MEDICINE
Payer: COMMERCIAL

## 2020-10-15 DIAGNOSIS — Z13.6 CARDIOVASCULAR SCREENING; LDL GOAL LESS THAN 160: ICD-10-CM

## 2020-10-15 DIAGNOSIS — E78.5 HYPERLIPIDEMIA LDL GOAL <100: ICD-10-CM

## 2020-10-15 DIAGNOSIS — H10.13 ALLERGIC CONJUNCTIVITIS, BILATERAL: ICD-10-CM

## 2020-10-15 DIAGNOSIS — Z82.49 FAMILY HISTORY OF ISCHEMIC HEART DISEASE: ICD-10-CM

## 2020-10-15 PROCEDURE — 75571 CT HRT W/O DYE W/CA TEST: CPT | Mod: GA

## 2020-10-15 PROCEDURE — 75571 CT HRT W/O DYE W/CA TEST: CPT | Mod: 26 | Performed by: INTERNAL MEDICINE

## 2020-10-16 RX ORDER — OLOPATADINE HYDROCHLORIDE 1 MG/ML
SOLUTION/ DROPS OPHTHALMIC
Qty: 5 ML | Refills: 11 | Status: SHIPPED | OUTPATIENT
Start: 2020-10-16 | End: 2023-09-15

## 2020-10-16 NOTE — TELEPHONE ENCOUNTER
Routing refill request to provider for review/approval because:  Drug not on the [facility] refill protocol     Bárbara ROBERTS RN  EP Triage

## 2020-11-02 NOTE — PROGRESS NOTES
"Ms Shital Parrish, the patient has been notified of following:      \"This video visit will be conducted via a call between you and your physician/provider. We have found that certain health care needs can be provided without the need for an in-person physical exam.  This service lets us provide the care you need with a video conversation.  If a prescription is necessary we can send it directly to your pharmacy.  If lab work is needed we can place an order for that and you can then stop by our lab to have the test done at a later time.     Video visits are billed at different rates depending on your insurance coverage.  Please reach out to your insurance provider with any questions.     If during the course of the call the physician/provider feels a video visit is not appropriate, you will not be charged for this service.\"     Patient has given verbal consent for Video visit? Yes  How would you like to obtain your AVS? Mail a copy  If you are dropped from the video visit, the video invite should be resent to:My Chart  Will anyone else be joining your video visit? No     Location:  Patient : home  Dr Sepulveda: office [facility]    Start: 4.00 pm  Stop: 4.30 pm      Videosystem: Sherif    HPI:     I had the privilege to evaluate and examine Ms Shital Parrish, who is 48 yr female patient with hypercholesterolemia.  Patient denies chest pain, shortness of breath and intermittent claudication.  Patient does not complaint about limitations during effort.  Patient has no palpitations.       PAST MEDICAL HISTORY:  Past Medical History:   Diagnosis Date     Edema 10/24/2013     Gastro-oesophageal reflux disease      Heavy periods 10/24/2013     Hyperthyroidism 9/26/2014    borderline-treated with a med for 6 months in [place] and then numbers improved. had a low TSH here and referred to [facility]. numbers there were borderline but normal and no antibodies. rec. 12/14-I123 uptake was increased so Graves dz dx'd. may do methimazole if repeat " TFTs show hyperthyroid     Leiomyoma of uterus, unspecified 2014     Nipple discharge     right side only. neg mammo and right breast u/s. MRI pending     PONV (postoperative nausea and vomiting)      Varicose veins of lower extremities with other complications 10/24/2013       CURRENT MEDICATIONS:  Current Outpatient Medications   Medication Sig Dispense Refill     atorvastatin (LIPITOR) 20 MG tablet Take 1 tablet (20 mg) by mouth daily 90 tablet 1     ciclopirox (PENLAC) 8 % external solution Apply 8 Application topically as needed To nails nails       EPIDUO FORTE 0.3-2.5 % gel Apply 0.3 Pump topically as needed       estradiol (VIVELLE-DOT) 0.1 MG/24HR bi-weekly patch Place 1 patch onto the skin twice a week 24 patch 3     levocetirizine (XYZAL) 5 MG tablet Take 5 mg by mouth every evening       METHIMAZOLE PO        montelukast (SINGULAIR) 10 MG tablet Take 1 tablet (10 mg) by mouth At Bedtime (Patient not taking: Reported on 2020) 30 tablet 3     olopatadine (PATANOL) 0.1 % ophthalmic solution INSTILL 1 DROP INTO BOTH EYES TWICE A DAY 5 mL 11     XIIDRA 5 % opthalmic solution Apply 5 Application topically as needed         PAST SURGICAL HISTORY:  Past Surgical History:   Procedure Laterality Date     APPENDECTOMY        SECTION       CHOLECYSTECTOMY, LAPOROSCOPIC  2012    Cholecystectomy, Laparoscopic     COLONOSCOPY N/A 2017    Procedure: COLONOSCOPY;  COLONOSCOPY;  Surgeon: Shannon Noel MD;  Location:  GI     COLONOSCOPY N/A 2019    Procedure: COLONOSCOPY;  Surgeon: Shannon Noel MD;  Location:  GI     HYSTERECTOMY SUPRACERVICAL N/A 2014    Procedure: HYSTERECTOMY SUPRACERVICAL;  Surgeon: Shauna Arceo MD;  Location:  OR [facility] and [facility] done by Adis/ [facility]. started as laparoscopic but converted to open due to adhesions     LAPAROSCOPIC CYSTECTOMY OVARIAN (ONCOLOGY)      right. benign cyst. partial oopherectomy in [place]      LAPAROSCOPIC SALPINGO-OOPHORECTOMY  11/11/14     MYOMECTOMY UTERUS  2008    hysteroscopic     TONSILLECTOMY  1979       ALLERGIES     Allergies   Allergen Reactions     Penicillins      Amoxicillin Rash       FAMILY HISTORY:  Family History   Problem Relation Age of Onset     Diabetes Mother      Cancer Mother         melanoma     Colon Polyps Mother      No Known Problems Father      No Known Problems Sister      Diabetes Maternal Grandmother      Breast Cancer Maternal Aunt      Thyroid Disease Cousin      No Known Problems Brother      No Known Problems Maternal Grandfather      No Known Problems Paternal Grandmother      Asthma No family hx of        SOCIAL HISTORY:  Social History     Socioeconomic History     Marital status:      Spouse name: Marianna     Number of children: 2     Years of education: 16     Highest education level: Not on file   Occupational History     Occupation: Program Leader     Comment: [employer]     Employer: [employer]    Social Needs     Financial resource strain: Not on file     Food insecurity     Worry: Not on file     Inability: Not on file     Transportation needs     Medical: Not on file     Non-medical: Not on file   Tobacco Use     Smoking status: Former Smoker     Smokeless tobacco: Never Used     Tobacco comment: Quit 31+ years ago.   Substance and Sexual Activity     Alcohol use: Yes     Alcohol/week: 0.0 standard drinks     Comment: Socially      Drug use: No     Sexual activity: Yes     Partners: Male     Birth control/protection: Female Surgical     Comment: hysterectomy   Lifestyle     Physical activity     Days per week: Not on file     Minutes per session: Not on file     Stress: Not on file   Relationships     Social connections     Talks on phone: Not on file     Gets together: Not on file     Attends Quaker service: Not on file     Active member of club or organization: Not on file     Attends meetings of clubs or organizations: Not on file      Relationship status: Not on file     Intimate partner violence     Fear of current or ex partner: Not on file     Emotionally abused: Not on file     Physically abused: Not on file     Forced sexual activity: Not on file   Other Topics Concern      Service Not Asked     Blood Transfusions No     Caffeine Concern Not Asked     Occupational Exposure Not Asked     Hobby Hazards Not Asked     Sleep Concern Not Asked     Stress Concern Not Asked     Weight Concern Not Asked     Special Diet No     Back Care Not Asked     Exercise No     Bike Helmet Not Asked     Comment: NA     Seat Belt Yes     Self-Exams Not Asked     Parent/sibling w/ CABG, MI or angioplasty before 65F 55M? Not Asked   Social History Narrative    The patient was born in [facility]. She eats fruits and vegetables every day. She takes calcium supplement and vitamin D.        No advanced care directives on file           ROS:   Constitutional: No fever, chills, or sweats. No weight gain/loss   ENT: No visual disturbance, ear ache, epistaxis, sore throat  Allergies/Immunologic: Negative.   Respiratory: No cough, hemoptysia  Cardiovascular: As per HPI  GI: No nausea, vomiting, hematemesis, melena, or hematochezia  : No urinary frequency, dysuria, or hematuria  Integument: Negative  Psychiatric: Negative  Neuro: Negative  Endocrinology: Negative   Musculoskeletal: Negative     Labs:  LIPID RESULTS:  Lab Results   Component Value Date    CHOL 280 (H) 09/25/2020    HDL 57 09/25/2020     (H) 09/25/2020    TRIG 89 09/25/2020    CHOLHDLRATIO 3.5 09/11/2015    NHDL 223 (H) 09/25/2020       LIVER ENZYME RESULTS:  Lab Results   Component Value Date    AST 17 09/25/2020    ALT 24 09/25/2020       CBC RESULTS:  Lab Results   Component Value Date    WBC 5.4 09/25/2020    RBC 4.51 09/25/2020    HGB 14.0 09/25/2020    HCT 39.9 09/25/2020    MCV 89 09/25/2020    MCH 31.0 09/25/2020    MCHC 35.1 09/25/2020    RDW 11.9 09/25/2020     09/25/2020        BMP RESULTS:  Lab Results   Component Value Date     09/25/2020    POTASSIUM 4.3 09/25/2020    CHLORIDE 103 09/25/2020    CO2 26 09/25/2020    ANIONGAP 6 09/25/2020    GLC 93 09/25/2020    BUN 15 09/25/2020    CR 0.67 09/25/2020    GFRESTIMATED >90 09/25/2020    GFRESTBLACK >90 09/25/2020    BUZZ 9.3 09/25/2020        A1C RESULTS:  Lab Results   Component Value Date    A1C 5.1 10/17/2019         Procedures:     CT Calcium Screening 10-15-20  IMPRESSIONS:  1.  No coronary calcifications.  2.  The total Agatston calcium score is 0 placing the patient in the  lowest percentile when compared to age and gender matched control  group.  3.  Please review Radiology report for incidental noncardiac findings  that will follow separately.      FINDINGS:     CORONARY ARTERY CALCIUM SCORES:   Total calcium score: 0  Left main coronary artery: 0  Left anterior descending coronary artery: 0  Circumflex coronary artery: 0  Right coronary artery: 0    RX reading FINDINGS:     Chest: The visualized tracheobronchial tree appears patent. Esophagus  appears unremarkable. Heart size within normal limits.. No significant  pericardial effusion.. Visualized thoracic aorta and main pulmonary  artery diameters appear within normal limits. There are no visualized  pathologically enlarged mediastinal, hilar or axillary lymph nodes. No  pleural effusion. The pleura appears unremarkable.. No pneumothorax.  No evidence of lung infection. Mild bibasilar atelectasis. No  suspicious lung nodules. Calcified granuloma in the right lower lobe.  Please see cardiology report for detailed assessment of the cardiac  findings.      Upper abdomen: Unremarkable on this non-contrast exam.     Bones/soft tissues: Limited evaluation on axial-only imaging, with  mild degenerative changes in the visualized spine. No acute osseous  abnormalities or suspicious bony lesions.                                                                       IMPRESSION:  No evidence of acute intrathoracic pathology.    Assessment and Plan:     We discussed the results with patient:  We discussed the importance of a heart healthy diet and lifestyle.   Despite CAC score is 0 - with a level of LDL-C > 205 mg/dl - I started with atorvastatin 20 mg/d.  Follow-up within 1 yr.    Larry Schaefer MD, PhD  Professor of Medicine  Division of Cardiology          CC  Patient Care Team:  Tim Hill MD as PCP - General (Family Practice)  Tim Hill MD as Assigned PCP  Shauna Arceo MD as Assigned OBGYN Provider  Larry Schaefer MD as Assigned Heart and Vascular Provider  LARRY SCHAEFER

## 2020-11-11 ENCOUNTER — TRANSFERRED RECORDS (OUTPATIENT)
Dept: HEALTH INFORMATION MANAGEMENT | Facility: CLINIC | Age: 50
End: 2020-11-11

## 2020-11-11 LAB
GLUCOSE SERPL-MCNC: 84 MG/DL (ref 65–99)
HBA1C MFR BLD: 5.2 % (ref 4–6)
TSH SERPL-ACNC: 0.9 UIU/ML (ref 0.3–5)

## 2020-11-12 ENCOUNTER — TRANSFERRED RECORDS (OUTPATIENT)
Dept: HEALTH INFORMATION MANAGEMENT | Facility: CLINIC | Age: 50
End: 2020-11-12

## 2020-12-07 ENCOUNTER — TRANSFERRED RECORDS (OUTPATIENT)
Dept: HEALTH INFORMATION MANAGEMENT | Facility: CLINIC | Age: 50
End: 2020-12-07

## 2020-12-14 DIAGNOSIS — E78.5 HYPERLIPIDEMIA LDL GOAL <130: ICD-10-CM

## 2020-12-14 DIAGNOSIS — Z23 NEED FOR SHINGLES VACCINE: Primary | ICD-10-CM

## 2020-12-14 LAB
ALT SERPL W P-5'-P-CCNC: 28 U/L (ref 0–50)
AST SERPL W P-5'-P-CCNC: 16 U/L (ref 0–45)
CHOLEST SERPL-MCNC: 256 MG/DL
HDLC SERPL-MCNC: 54 MG/DL
LDLC SERPL CALC-MCNC: 183 MG/DL
NONHDLC SERPL-MCNC: 202 MG/DL
TRIGL SERPL-MCNC: 94 MG/DL

## 2020-12-14 PROCEDURE — 80061 LIPID PANEL: CPT | Performed by: OBSTETRICS & GYNECOLOGY

## 2020-12-14 PROCEDURE — 84460 ALANINE AMINO (ALT) (SGPT): CPT | Performed by: OBSTETRICS & GYNECOLOGY

## 2020-12-14 PROCEDURE — 84450 TRANSFERASE (AST) (SGOT): CPT | Performed by: OBSTETRICS & GYNECOLOGY

## 2020-12-14 PROCEDURE — 36415 COLL VENOUS BLD VENIPUNCTURE: CPT | Performed by: OBSTETRICS & GYNECOLOGY

## 2020-12-14 PROCEDURE — 90471 IMMUNIZATION ADMIN: CPT

## 2020-12-14 PROCEDURE — 90750 HZV VACC RECOMBINANT IM: CPT

## 2020-12-14 NOTE — PROGRESS NOTES
Prior to immunization administration, verified patients identity using patient s name and date of birth. Please see Immunization Activity for additional information.     Screening Questionnaire for Adult Immunization    Are you sick today?   No   Do you have allergies to medications, food, a vaccine component or latex?   No   Have you ever had a serious reaction after receiving a vaccination?   No   Do you have a long-term health problem with heart, lung, kidney, or metabolic disease (e.g., diabetes), asthma, a blood disorder, no spleen, complement component deficiency, a cochlear implant, or a spinal fluid leak?  Are you on long-term aspirin therapy?   No   Do you have cancer, leukemia, HIV/AIDS, or any other immune system problem?   No   Do you have a parent, brother, or sister with an immune system problem?   No   In the past 3 months, have you taken medications that affect  your immune system, such as prednisone, other steroids, or anticancer drugs; drugs for the treatment of rheumatoid arthritis, Crohn s disease, or psoriasis; or have you had radiation treatments?   No   Have you had a seizure, or a brain or other nervous system problem?   No   During the past year, have you received a transfusion of blood or blood    products, or been given immune (gamma) globulin or antiviral drug?   No   For women: Are you pregnant or is there a chance you could become       pregnant during the next month?   No   Have you received any vaccinations in the past 4 weeks?   No     Immunization questionnaire answers were all negative.        Per orders of Dr. Arceo, injection of Shingrix given by Anabel Boston CMA. Patient instructed to remain in clinic for 15 minutes afterwards, and to report any adverse reaction to me immediately.       Screening performed by Anabel Boston CMA on 12/14/2020 at 8:29 AM.

## 2020-12-16 ENCOUNTER — OFFICE VISIT (OUTPATIENT)
Dept: FAMILY MEDICINE | Facility: CLINIC | Age: 50
End: 2020-12-16
Payer: COMMERCIAL

## 2020-12-16 VITALS
BODY MASS INDEX: 28.08 KG/M2 | WEIGHT: 164.5 LBS | DIASTOLIC BLOOD PRESSURE: 74 MMHG | OXYGEN SATURATION: 98 % | HEART RATE: 77 BPM | TEMPERATURE: 97.4 F | SYSTOLIC BLOOD PRESSURE: 110 MMHG

## 2020-12-16 DIAGNOSIS — T78.40XA ALLERGIC REACTION, INITIAL ENCOUNTER: Primary | ICD-10-CM

## 2020-12-16 DIAGNOSIS — R10.9 STOMACH PAIN: ICD-10-CM

## 2020-12-16 PROCEDURE — 99213 OFFICE O/P EST LOW 20 MIN: CPT | Performed by: FAMILY MEDICINE

## 2020-12-16 NOTE — PROGRESS NOTES
Subjective     Shital Parrish is a 50 year old female who presents to clinic today for the following health issues:    HPI         Abdominal/Flank Pain  Onset/Duration: 2 days  Description:   Character: intense   Location: right upper quadrant  Radiation: Back, with palpation  Intensity: moderate, severe  Progression of Symptoms:  improving and hasent felt since yesterday slight discomfort today  Accompanying Signs & Symptoms:  Fever/Chills: YES, 2 nights ago  Gas/Bloating: no  Nausea: YES  Vomitting: no  Diarrhea: no  Constipation: no  Dysuria or Hematuria: no  History:   Trauma: no  Previous similar pain: no  Previous tests done: none  Precipitating factors:   Does the pain change with:     Food: no    Bowel Movement: no    Urination: no   Other factors:  Patient is dealing with stress at work maybe related  Therapies tried and outcome: None  Patient's last menstrual period was 10/26/2014.      Patient would like a letter for her work to take a leave  Patient would like about 1 month  Patient states online would be ok if it is available        Review of Systems   Constitutional, HEENT, cardiovascular, pulmonary, gi and gu systems are negative, except as otherwise noted.      Objective    /74 (Cuff Size: Adult Regular)   Pulse 77   Temp 97.4  F (36.3  C) (Tympanic)   Wt 74.6 kg (164 lb 8 oz)   LMP 10/26/2014   SpO2 98%   BMI 28.08 kg/m    Body mass index is 28.08 kg/m .  Physical Exam   GENERAL: healthy, alert and no distress  NECK: no adenopathy, no asymmetry, masses, or scars and thyroid normal to palpation  RESP: lungs clear to auscultation - no rales, rhonchi or wheezes  CV: regular rate and rhythm, normal S1 S2, no S3 or S4, no murmur, click or rub, no peripheral edema and peripheral pulses strong  ABDOMEN: soft, nontender, no hepatosplenomegaly, no masses and bowel sounds normal  MS: no gross musculoskeletal defects noted, no edema            Assessment & Plan     Allergic reaction, initial  encounter  started after 2nd shot of shingrix 2 days ago, has abd pan and perilesional erythema/tenderenss on left forearm  Encouraged her increasing hydration and  Trial of antihistamine      Stomach pain  mentioned above           FUTURE APPOINTMENTS:       - Follow-up visit in 6 months for CPE    No follow-ups on file.    Tim Hill MD  [facility]

## 2020-12-16 NOTE — LETTER
[facility]  [address] [address]  [phone]        December 16, 2020      Shital Parrish  [address] [place] [address]      Dear Shital,    As your health care provider, I am concerned that your age and/or underlying medical condition puts you at particularly high risk for serious complications should you contract a COVID-19 infection.     Specifically, you have the following conditions/diagnoses, included as high risk conditions per the Centers for Disease Control and Prevention at CDC.gov.     Hyperlipidemia       COVID-19 is a new disease and there is limited information regarding risk factors for severe disease. Based on currently available information and clinical expertise, older adults and people of any age who have serious underlying medical conditions might be at higher risk for severe illness from COVID-19.     It is my medical opinion that you should avoid close contact with others and work from home if possible during this COVID-19 pandemic at least 1-31-21.     Tim Hill MD    [facility]

## 2020-12-18 ENCOUNTER — TRANSFERRED RECORDS (OUTPATIENT)
Dept: HEALTH INFORMATION MANAGEMENT | Facility: CLINIC | Age: 50
End: 2020-12-18

## 2021-01-04 ENCOUNTER — VIRTUAL VISIT (OUTPATIENT)
Dept: CARDIOLOGY | Facility: CLINIC | Age: 51
End: 2021-01-04
Attending: INTERNAL MEDICINE
Payer: COMMERCIAL

## 2021-01-04 DIAGNOSIS — E78.5 HYPERLIPIDEMIA LDL GOAL <100: ICD-10-CM

## 2021-01-04 DIAGNOSIS — Z13.6 CARDIOVASCULAR SCREENING; LDL GOAL LESS THAN 160: Primary | ICD-10-CM

## 2021-01-04 PROCEDURE — 99214 OFFICE O/P EST MOD 30 MIN: CPT | Mod: 95 | Performed by: INTERNAL MEDICINE

## 2021-01-04 NOTE — PROGRESS NOTES
Shital Parrihs is a 50 year old female who is being evaluated via a billable video visit.    For which she agrees.            HPI:     I had the privilege to evaluate and examine Ms Shital Parrish, who is 50 yr female patient with hypercholesterolemia.  Patient denies chest pain, shortness of breath and intermittent claudication.  Patient does not complaint about limitations during effort.  Patient has no palpitations.      PAST MEDICAL HISTORY:  Past Medical History:   Diagnosis Date     Edema 10/24/2013     Gastro-oesophageal reflux disease      Heavy periods 10/24/2013     Hyperthyroidism 9/26/2014    borderline-treated with a med for 6 months in [place] and then numbers improved. had a low TSH here and referred to [facility]. numbers there were borderline but normal and no antibodies. rec. 12/14-I123 uptake was increased so Graves dz dx'd. may do methimazole if repeat TFTs show hyperthyroid     Leiomyoma of uterus, unspecified 9/26/2014     Nipple discharge 09/14    right side only. neg mammo and right breast u/s. MRI pending     PONV (postoperative nausea and vomiting)      Varicose veins of lower extremities with other complications 10/24/2013       CURRENT MEDICATIONS:  Current Outpatient Medications   Medication Sig Dispense Refill     ciclopirox (PENLAC) 8 % external solution Apply 8 Application topically as needed To nails nails       EPIDUO FORTE 0.3-2.5 % gel Apply 0.3 Pump topically as needed       estradiol (VIVELLE-DOT) 0.1 MG/24HR bi-weekly patch Place 1 patch onto the skin twice a week 24 patch 3     METHIMAZOLE PO Take 2.5 mg by mouth daily       olopatadine (PATANOL) 0.1 % ophthalmic solution INSTILL 1 DROP INTO BOTH EYES TWICE A DAY 5 mL 11     XIIDRA 5 % opthalmic solution Apply 5 Application topically as needed       atorvastatin (LIPITOR) 20 MG tablet Take 1 tablet (20 mg) by mouth daily (Patient not taking: Reported on 1/4/2021) 90 tablet 1     levocetirizine (XYZAL) 5 MG tablet Take 5 mg by mouth  every evening       montelukast (SINGULAIR) 10 MG tablet Take 1 tablet (10 mg) by mouth At Bedtime (Patient not taking: Reported on 2020) 30 tablet 3       PAST SURGICAL HISTORY:  Past Surgical History:   Procedure Laterality Date     APPENDECTOMY        SECTION       CHOLECYSTECTOMY, LAPOROSCOPIC  2012    Cholecystectomy, Laparoscopic     COLONOSCOPY N/A 2017    Procedure: COLONOSCOPY;  COLONOSCOPY;  Surgeon: Shannon Noel MD;  Location:  GI     COLONOSCOPY N/A 2019    Procedure: COLONOSCOPY;  Surgeon: Shannon Noel MD;  Location:  GI     HYSTERECTOMY SUPRACERVICAL N/A 2014    Procedure: HYSTERECTOMY SUPRACERVICAL;  Surgeon: Shauna Arceo MD;  Location:  OR [facility] and [facility] done by [facility]/ [facility]. started as laparoscopic but converted to open due to adhesions     LAPAROSCOPIC CYSTECTOMY OVARIAN (ONCOLOGY)      right. benign cyst. partial oopherectomy in [facility]     LAPAROSCOPIC SALPINGO-OOPHORECTOMY  14     MYOMECTOMY UTERUS      hysteroscopic     TONSILLECTOMY  1979       ALLERGIES     Allergies   Allergen Reactions     Penicillins      Seasonal Allergies      Amoxicillin Rash       FAMILY HISTORY:  Family History   Problem Relation Age of Onset     Diabetes Mother      Cancer Mother         melanoma     Colon Polyps Mother      No Known Problems Father      No Known Problems Sister      Diabetes Maternal Grandmother      Breast Cancer Maternal Aunt      Thyroid Disease Cousin      No Known Problems Brother      No Known Problems Maternal Grandfather      No Known Problems Paternal Grandmother      Asthma No family hx of        SOCIAL HISTORY:  Social History     Socioeconomic History     Marital status:      Spouse name: Marianna     Number of children: 2     Years of education: 16     Highest education level: None   Occupational History     Occupation: Program Leader     Comment: [employer]     Employer: [employer]    Social Needs      Financial resource strain: None     Food insecurity     Worry: None     Inability: None     Transportation needs     Medical: None     Non-medical: None   Tobacco Use     Smoking status: Former Smoker     Smokeless tobacco: Never Used     Tobacco comment: Quit 31+ years ago.   Substance and Sexual Activity     Alcohol use: Yes     Alcohol/week: 0.0 standard drinks     Comment: Socially      Drug use: No     Sexual activity: Yes     Partners: Male     Birth control/protection: Female Surgical     Comment: hysterectomy   Lifestyle     Physical activity     Days per week: None     Minutes per session: None     Stress: None   Relationships     Social connections     Talks on phone: None     Gets together: None     Attends Advent service: None     Active member of club or organization: None     Attends meetings of clubs or organizations: None     Relationship status: None     Intimate partner violence     Fear of current or ex partner: None     Emotionally abused: None     Physically abused: None     Forced sexual activity: None   Other Topics Concern      Service Not Asked     Blood Transfusions No     Caffeine Concern Not Asked     Occupational Exposure Not Asked     Hobby Hazards Not Asked     Sleep Concern Not Asked     Stress Concern Not Asked     Weight Concern Not Asked     Special Diet No     Back Care Not Asked     Exercise No     Bike Helmet Not Asked     Comment: NA     Seat Belt Yes     Self-Exams Not Asked     Parent/sibling w/ CABG, MI or angioplasty before 65F 55M? Not Asked   Social History Narrative    The patient was born in [facility]. She eats fruits and vegetables every day. She takes calcium supplement and vitamin D.        No advanced care directives on file           ROS:   Constitutional: No fever, chills, or sweats. No weight gain/loss   ENT: No visual disturbance, ear ache, epistaxis, sore throat  Allergies/Immunologic: Negative.   Respiratory: No cough,  "hemoptysia  Cardiovascular: As per HPI  GI: No nausea, vomiting, hematemesis, melena, or hematochezia  : No urinary frequency, dysuria, or hematuria  Integument: Negative  Psychiatric: Negative  Neuro: Negative  Endocrinology: Negative   Musculoskeletal: Negative    EXAM:    Vitals - Patient Reported  Weight (Patient Reported): 71.7 kg (158 lb)  Height (Patient Reported): 160 cm (5' 3\")  BMI (Based on Pt Reported Ht/Wt): 27.99  Pain Score: No Pain (0)(No SOB)    Labs:  LIPID RESULTS:  Lab Results   Component Value Date    CHOL 256 (H) 12/14/2020    HDL 54 12/14/2020     (H) 12/14/2020    TRIG 94 12/14/2020    CHOLHDLRATIO 3.5 09/11/2015    NHDL 202 (H) 12/14/2020       LIVER ENZYME RESULTS:  Lab Results   Component Value Date    AST 16 12/14/2020    ALT 28 12/14/2020       CBC RESULTS:  Lab Results   Component Value Date    WBC 5.4 09/25/2020    RBC 4.51 09/25/2020    HGB 14.0 09/25/2020    HCT 39.9 09/25/2020    MCV 89 09/25/2020    MCH 31.0 09/25/2020    MCHC 35.1 09/25/2020    RDW 11.9 09/25/2020     09/25/2020       BMP RESULTS:  Lab Results   Component Value Date     09/25/2020    POTASSIUM 4.3 09/25/2020    CHLORIDE 103 09/25/2020    CO2 26 09/25/2020    ANIONGAP 6 09/25/2020    GLC 84 11/11/2020    BUN 15 09/25/2020    CR 0.67 09/25/2020    GFRESTIMATED >90 09/25/2020    GFRESTBLACK >90 09/25/2020    BUZZ 9.3 09/25/2020        A1C RESULTS:  Lab Results   Component Value Date    A1C 5.2 11/11/2020           Assessment and Plan:     I discussed the results with patient.  We discussed the importance of a heart healthy diet and lifestyle.  We continue with same medical regimen.  Follow-up with repeating fasting within 6 months.      David Schaefer MD, PhD  Professor of Medicine  Division of Cardiology      CC  Patient Care Team:  Tim Hill MD as PCP - General (Family Practice)  Tim Hill MD as Assigned PCP  Shauna Arceo MD as Assigned OBGYN Provider  DAVID SCHAEFER A    Objective  " "  Vitals - Patient Reported  Weight (Patient Reported): 71.7 kg (158 lb)  Height (Patient Reported): 160 cm (5' 3\")  BMI (Based on Pt Reported Ht/Wt): 27.99  Pain Score: No Pain (0)(No SOB)           Patient agrees for a billable video visit    Video-Visit Details    Type of service:  Video Visit   Start: 4.00 pm  Stop: 4.30 pm        Originating Location Patient: Home    Distant Location Dr MARI Marie  [facility]     Platform used for Video Visit: Chidi  "

## 2021-01-04 NOTE — PATIENT INSTRUCTIONS
Patient Instructions:  It was a pleasure to see you in the cardiology clinic today.      If you have any questions, you can reach my nurse, Pat GUERRA LPN, at [phone].  Press Option #1 for the [facility], and then press Option #4 for nursing.    We are encouraging the use of [employer] to communicate with your HealthCare Provider    Medication Changes: None.    Recommendations: Repeat fasting labs in 6 months.    Studies Ordered: None.    The results from today include: None.    Please follow up: With Dr. Sepulveda to be determined.    Sincerely,    David Sepulveda MD     If you have an urgent need after hours (8:00 am to 4:30 pm) please call [phone] and ask for the cardiology fellow on call.

## 2021-01-04 NOTE — LETTER
1/4/2021      RE: Shital Parrish  [address]  [place] [address]       Dear Colleague,    Thank you for the opportunity to participate in the care of your patient, Shital Parrish, at the [facility] HEART [facility]. Please see a copy of my visit note below.    Shital Parrish is a 50 year old female who is being evaluated via a billable video visit.    For which she agrees.            HPI:     I had the privilege to evaluate and examine Ms Shital Parrish, who is 50 yr female patient with hypercholesterolemia.  Patient denies chest pain, shortness of breath and intermittent claudication.  Patient does not complaint about limitations during effort.  Patient has no palpitations.      PAST MEDICAL HISTORY:  Past Medical History:   Diagnosis Date     Edema 10/24/2013     Gastro-oesophageal reflux disease      Heavy periods 10/24/2013     Hyperthyroidism 9/26/2014    borderline-treated with a med for 6 months in [place] and then numbers improved. had a low TSH here and referred to [facility]. numbers there were borderline but normal and no antibodies. rec. 12/14-I123 uptake was increased so Graves dz dx'd. may do methimazole if repeat TFTs show hyperthyroid     Leiomyoma of uterus, unspecified 9/26/2014     Nipple discharge 09/14    right side only. neg mammo and right breast u/s. MRI pending     PONV (postoperative nausea and vomiting)      Varicose veins of lower extremities with other complications 10/24/2013       CURRENT MEDICATIONS:  Current Outpatient Medications   Medication Sig Dispense Refill     ciclopirox (PENLAC) 8 % external solution Apply 8 Application topically as needed To nails nails       EPIDUO FORTE 0.3-2.5 % gel Apply 0.3 Pump topically as needed       estradiol (VIVELLE-DOT) 0.1 MG/24HR bi-weekly patch Place 1 patch onto the skin twice a week 24 patch 3     METHIMAZOLE PO Take 2.5 mg by mouth daily       olopatadine (PATANOL) 0.1 %  ophthalmic solution INSTILL 1 DROP INTO BOTH EYES TWICE A DAY 5 mL 11     XIIDRA 5 % opthalmic solution Apply 5 Application topically as needed       atorvastatin (LIPITOR) 20 MG tablet Take 1 tablet (20 mg) by mouth daily (Patient not taking: Reported on 2021) 90 tablet 1     levocetirizine (XYZAL) 5 MG tablet Take 5 mg by mouth every evening       montelukast (SINGULAIR) 10 MG tablet Take 1 tablet (10 mg) by mouth At Bedtime (Patient not taking: Reported on 2020) 30 tablet 3       PAST SURGICAL HISTORY:  Past Surgical History:   Procedure Laterality Date     APPENDECTOMY        SECTION       CHOLECYSTECTOMY, LAPOROSCOPIC  2012    Cholecystectomy, Laparoscopic     COLONOSCOPY N/A 2017    Procedure: COLONOSCOPY;  COLONOSCOPY;  Surgeon: Shannon Noel MD;  Location:  GI     COLONOSCOPY N/A 2019    Procedure: COLONOSCOPY;  Surgeon: Shannon Noel MD;  Location:  GI     HYSTERECTOMY SUPRACERVICAL N/A 2014    Procedure: HYSTERECTOMY SUPRACERVICAL;  Surgeon: Shauna Arceo MD;  Location:  OR [facility] and [facility] done by Adis/ Leora. started as laparoscopic but converted to open due to adhesions     LAPAROSCOPIC CYSTECTOMY OVARIAN (ONCOLOGY)      right. benign cyst. partial oopherectomy in [facility]     LAPAROSCOPIC SALPINGO-OOPHORECTOMY  14     MYOMECTOMY UTERUS  2008    hysteroscopic     TONSILLECTOMY  1979       ALLERGIES     Allergies   Allergen Reactions     Penicillins      Seasonal Allergies      Amoxicillin Rash       FAMILY HISTORY:  Family History   Problem Relation Age of Onset     Diabetes Mother      Cancer Mother         melanoma     Colon Polyps Mother      No Known Problems Father      No Known Problems Sister      Diabetes Maternal Grandmother      Breast Cancer Maternal Aunt      Thyroid Disease Cousin      No Known Problems Brother      No Known Problems Maternal Grandfather      No Known Problems Paternal Grandmother      Asthma No family  hx of        SOCIAL HISTORY:  Social History     Socioeconomic History     Marital status:      Spouse name: Marianna     Number of children: 2     Years of education: 16     Highest education level: None   Occupational History     Occupation: Program Leader     Comment: [employer]     Employer: [employer]    Social Needs     Financial resource strain: None     Food insecurity     Worry: None     Inability: None     Transportation needs     Medical: None     Non-medical: None   Tobacco Use     Smoking status: Former Smoker     Smokeless tobacco: Never Used     Tobacco comment: Quit 31+ years ago.   Substance and Sexual Activity     Alcohol use: Yes     Alcohol/week: 0.0 standard drinks     Comment: Socially      Drug use: No     Sexual activity: Yes     Partners: Male     Birth control/protection: Female Surgical     Comment: hysterectomy   Lifestyle     Physical activity     Days per week: None     Minutes per session: None     Stress: None   Relationships     Social connections     Talks on phone: None     Gets together: None     Attends Evangelical service: None     Active member of club or organization: None     Attends meetings of clubs or organizations: None     Relationship status: None     Intimate partner violence     Fear of current or ex partner: None     Emotionally abused: None     Physically abused: None     Forced sexual activity: None   Other Topics Concern      Service Not Asked     Blood Transfusions No     Caffeine Concern Not Asked     Occupational Exposure Not Asked     Hobby Hazards Not Asked     Sleep Concern Not Asked     Stress Concern Not Asked     Weight Concern Not Asked     Special Diet No     Back Care Not Asked     Exercise No     Bike Helmet Not Asked     Comment: NA     Seat Belt Yes     Self-Exams Not Asked     Parent/sibling w/ CABG, MI or angioplasty before 65F 55M? Not Asked   Social History Narrative    The patient was born in [facility]. She eats fruits and  "vegetables every day. She takes calcium supplement and vitamin D.        No advanced care directives on file           ROS:   Constitutional: No fever, chills, or sweats. No weight gain/loss   ENT: No visual disturbance, ear ache, epistaxis, sore throat  Allergies/Immunologic: Negative.   Respiratory: No cough, hemoptysia  Cardiovascular: As per HPI  GI: No nausea, vomiting, hematemesis, melena, or hematochezia  : No urinary frequency, dysuria, or hematuria  Integument: Negative  Psychiatric: Negative  Neuro: Negative  Endocrinology: Negative   Musculoskeletal: Negative    EXAM:    Vitals - Patient Reported  Weight (Patient Reported): 71.7 kg (158 lb)  Height (Patient Reported): 160 cm (5' 3\")  BMI (Based on Pt Reported Ht/Wt): 27.99  Pain Score: No Pain (0)(No SOB)    Labs:  LIPID RESULTS:  Lab Results   Component Value Date    CHOL 256 (H) 12/14/2020    HDL 54 12/14/2020     (H) 12/14/2020    TRIG 94 12/14/2020    CHOLHDLRATIO 3.5 09/11/2015    NHDL 202 (H) 12/14/2020       LIVER ENZYME RESULTS:  Lab Results   Component Value Date    AST 16 12/14/2020    ALT 28 12/14/2020       CBC RESULTS:  Lab Results   Component Value Date    WBC 5.4 09/25/2020    RBC 4.51 09/25/2020    HGB 14.0 09/25/2020    HCT 39.9 09/25/2020    MCV 89 09/25/2020    MCH 31.0 09/25/2020    MCHC 35.1 09/25/2020    RDW 11.9 09/25/2020     09/25/2020       BMP RESULTS:  Lab Results   Component Value Date     09/25/2020    POTASSIUM 4.3 09/25/2020    CHLORIDE 103 09/25/2020    CO2 26 09/25/2020    ANIONGAP 6 09/25/2020    GLC 84 11/11/2020    BUN 15 09/25/2020    CR 0.67 09/25/2020    GFRESTIMATED >90 09/25/2020    GFRESTBLACK >90 09/25/2020    BUZZ 9.3 09/25/2020        A1C RESULTS:  Lab Results   Component Value Date    A1C 5.2 11/11/2020           Assessment and Plan:     I discussed the results with patient.  We discussed the importance of a heart healthy diet and lifestyle.  We continue with same medical " "regimen.  Follow-up with repeating fasting within 6 months.      David Schaefer MD, PhD  Professor of Medicine  Division of Cardiology      CC  Patient Care Team:  Tim Hill MD as PCP - General (Family Practice)  Tim Hill MD as Assigned PCP  Shauna Arceo MD as Assigned OBGYN Provider  DAVID SCHAEFER    Objective    Vitals - Patient Reported  Weight (Patient Reported): 71.7 kg (158 lb)  Height (Patient Reported): 160 cm (5' 3\")  BMI (Based on Pt Reported Ht/Wt): 27.99  Pain Score: No Pain (0)(No SOB)           Patient agrees for a billable video visit    Video-Visit Details    Type of service:  Video Visit   Start: 4.00 pm  Stop: 4.30 pm        Originating Location Patient: Home    Distant Location Dr MARI Schaefer [facility] HEART [facility]     Platform used for Video Visit: [employer]      Please do not hesitate to contact me if you have any questions/concerns.     Sincerely,     David Schaefer MD    "

## 2021-01-19 ENCOUNTER — TELEPHONE (OUTPATIENT)
Dept: FAMILY MEDICINE | Facility: CLINIC | Age: 51
End: 2021-01-19

## 2021-01-19 NOTE — LETTER
[facility]  [address] [address]  [phone]        January 19, 2021      Shital Parrish  [address] [place] [address]      Dear Shital,    As your health care provider, I am concerned that your age and/or underlying medical condition puts you at particularly high risk for serious complications should you contract a COVID-19 infection.     Specifically, you have the following conditions/diagnoses, included as high risk conditions per the Centers for Disease Control and Prevention at CDC.gov.     Hyperlipidemia         COVID-19 is a new disease and there is limited information regarding risk factors for severe disease. Based on currently available information and clinical expertise, older adults and people of any age who have serious underlying medical conditions might be at higher risk for severe illness from COVID-19.      It is my medical opinion that you should avoid close contact with others and work from home if possible during this COVID-19 pandemic at least 2-28-21.        Tim Hill MD    [facility]

## 2021-01-19 NOTE — TELEPHONE ENCOUNTER
Left message to notify pt the letter is written. Waiting call back with instruction on what to do with the letter. Letter in team 3 team shelf on the wall.  Florence Mei,

## 2021-01-19 NOTE — TELEPHONE ENCOUNTER
Patient is calling in stating Dr. Hill wrote her a letter to be out of work for a month, the patient would like to have Dr. Hill extend this absence for one more month. Please follow up regarding letter. [phone]    .Edwina DYER    [facility] [place] [place]

## 2021-02-19 ENCOUNTER — TELEPHONE (OUTPATIENT)
Dept: FAMILY MEDICINE | Facility: CLINIC | Age: 51
End: 2021-02-19

## 2021-02-19 NOTE — LETTER
[facility]  [address] [address]  [phone]        February 19, 2021      Shital Parrish  [address] [place] [address]      Dear Shital,    As your health care provider, I am concerned that your age and/or underlying medical condition puts you at particularly high risk for serious complications should you contract a COVID-19 infection.     Specifically, you have the following conditions/diagnoses, included as high risk conditions per the Centers for Disease Control and Prevention at CDC.gov.      Hyperlipidemia         COVID-19 is a new disease and there is limited information regarding risk factors for severe disease. Based on currently available information and clinical expertise, older adults and people of any age who have serious underlying medical conditions might be at higher risk for severe illness from COVID-19.      It is my medical opinion that you should avoid close contact with others and work from home if possible during this COVID-19 pandemic at least 4-30-21.        Tim Hill MD    [facility]

## 2021-02-19 NOTE — TELEPHONE ENCOUNTER
patient is request a new letter prior to appointment with provider.    Rosa CAMPOSRN BSN  [facility]  [phone]

## 2021-02-19 NOTE — TELEPHONE ENCOUNTER
Patient had previously had Dr. Hill write a letter to her employer, patient is requesting another letter to extend her leave from work from 03/12/2021 until the end of April. The patient has scheduled an office visit with Dr. Hill to discuss long term disability paperwork from [employer]. Patient will talk with [employer] today. Please contact patient at [phone] okay to leave a detailed vm.      .Edwina DYER    [facility] [place]

## 2021-02-22 ENCOUNTER — OFFICE VISIT (OUTPATIENT)
Dept: FAMILY MEDICINE | Facility: CLINIC | Age: 51
End: 2021-02-22
Payer: COMMERCIAL

## 2021-02-22 VITALS
HEART RATE: 76 BPM | OXYGEN SATURATION: 98 % | DIASTOLIC BLOOD PRESSURE: 80 MMHG | HEIGHT: 63 IN | SYSTOLIC BLOOD PRESSURE: 132 MMHG | BODY MASS INDEX: 29.95 KG/M2 | TEMPERATURE: 97.7 F | WEIGHT: 169 LBS

## 2021-02-22 DIAGNOSIS — E78.2 MIXED HYPERLIPIDEMIA: ICD-10-CM

## 2021-02-22 DIAGNOSIS — E55.9 VITAMIN D DEFICIENCY: Primary | ICD-10-CM

## 2021-02-22 DIAGNOSIS — E05.90 HYPERTHYROIDISM: ICD-10-CM

## 2021-02-22 PROCEDURE — 99213 OFFICE O/P EST LOW 20 MIN: CPT | Performed by: FAMILY MEDICINE

## 2021-02-22 RX ORDER — AMOXICILLIN 500 MG
CAPSULE ORAL
COMMUNITY
End: 2021-12-20

## 2021-02-22 ASSESSMENT — MIFFLIN-ST. JEOR: SCORE: 1350.71

## 2021-02-22 NOTE — PROGRESS NOTES
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 And, she wants file with her disability insurance, staying current status of 'out of work' until 4-30-21 based on her underlying health condition including hyperthyroidism/vitamin D deficiency/mixed hyperlipidemia. She is currently seeing specialist for her thyroid and lipid, and current taking vitamin D supplement  However, she has frequent URI with poor immunity for last 2-3 years. We will consider these health issue to appeal having her to take short term or long term disability benefits from it if possible          20 minutes spent on the date of the encounter doing chart review, history and exam, documentation and further activities as noted above       FUTURE APPOINTMENTS:       - Follow-up visit in 3 months for general health check     No follow-ups on file.    Tim Hill MD  [facility] YEISON Isaacs is a 50 year old who presents for the following health issues     HPI       Patient has paperwork from [employer].       Review of Systems   Constitutional, HEENT, cardiovascular, pulmonary, gi and gu systems are negative, except as otherwise noted.      Objective    /80   Pulse 76   Temp 97.7  F (36.5  C) (Tympanic)   Ht 1.6 m (5' 3\")   Wt 76.7 kg (169 lb)   LMP 10/26/2014   SpO2 98%   BMI 29.94 kg/m    Body mass index is 29.94 kg/m .  Physical Exam   GENERAL: healthy, alert and no distress  NECK: no adenopathy, no asymmetry, masses, or scars and thyroid normal to palpation  RESP: lungs clear to auscultation - no rales, rhonchi or wheezes  CV: regular rate and rhythm, normal S1 S2, no S3 or S4, no murmur, click or rub, no peripheral edema and peripheral pulses strong  ABDOMEN: soft, nontender, no " hepatosplenomegaly, no masses and bowel sounds normal  MS: no gross musculoskeletal defects noted, no edema

## 2021-03-11 ENCOUNTER — OFFICE VISIT (OUTPATIENT)
Dept: FAMILY MEDICINE | Facility: CLINIC | Age: 51
End: 2021-03-11
Payer: COMMERCIAL

## 2021-03-11 VITALS
OXYGEN SATURATION: 98 % | BODY MASS INDEX: 30.79 KG/M2 | DIASTOLIC BLOOD PRESSURE: 80 MMHG | HEIGHT: 63 IN | TEMPERATURE: 97.7 F | HEART RATE: 78 BPM | WEIGHT: 173.8 LBS | SYSTOLIC BLOOD PRESSURE: 110 MMHG

## 2021-03-11 DIAGNOSIS — E78.2 MIXED HYPERLIPIDEMIA: Primary | ICD-10-CM

## 2021-03-11 DIAGNOSIS — E05.90 HYPERTHYROIDISM: ICD-10-CM

## 2021-03-11 PROCEDURE — 99213 OFFICE O/P EST LOW 20 MIN: CPT | Performed by: FAMILY MEDICINE

## 2021-03-11 ASSESSMENT — MIFFLIN-ST. JEOR: SCORE: 1372.48

## 2021-03-11 NOTE — PROGRESS NOTES
"    Assessment & Plan     Mixed hyperlipidemia  Has been stable,   She is supposed to file LTD with [employer], they did not send me a document to fill to supports her LTD filing,   The documents will be reviewed and filled when available     Hyperthyroidism  Stable, keep monitoring              BMI:   Estimated body mass index is 30.79 kg/m  as calculated from the following:    Height as of this encounter: 1.6 m (5' 3\").    Weight as of this encounter: 78.8 kg (173 lb 12.8 oz).   Weight management plan: Discussed healthy diet and exercise guidelines    FUTURE APPOINTMENTS:       - Follow-up visit in 6 months for general health check     No follow-ups on file.    Tim Hill MD  [employer] YEISON Isaacs is a 50 year old who presents for the following health issues     HPI       Patient is here today regarding forms that need to be filled out.  - disability forms.     Review of Systems   Constitutional, HEENT, cardiovascular, pulmonary, gi and gu systems are negative, except as otherwise noted.      Objective    /80   Pulse 78   Temp 97.7  F (36.5  C) (Tympanic)   Ht 1.6 m (5' 3\")   Wt 78.8 kg (173 lb 12.8 oz)   LMP 10/26/2014   SpO2 98%   BMI 30.79 kg/m    Body mass index is 30.79 kg/m .  Physical Exam   GENERAL: healthy, alert and no distress  NECK: no adenopathy, no asymmetry, masses, or scars and thyroid normal to palpation  RESP: lungs clear to auscultation - no rales, rhonchi or wheezes  CV: regular rate and rhythm, normal S1 S2, no S3 or S4, no murmur, click or rub, no peripheral edema and peripheral pulses strong  ABDOMEN: soft, nontender, no hepatosplenomegaly, no masses and bowel sounds normal  MS: no gross musculoskeletal defects noted, no edema                "

## 2021-04-28 ENCOUNTER — TELEPHONE (OUTPATIENT)
Dept: FAMILY MEDICINE | Facility: CLINIC | Age: 51
End: 2021-04-28

## 2021-04-28 NOTE — LETTER
[facility]  [address] [address]  [phone]        April 28, 2021      Shital Parrish  [address] [place] [address]      Dear Shital,    As your health care provider, I am concerned that your age and/or underlying medical condition puts you at particularly high risk for serious complications should you contract a COVID-19 infection.      Specifically, you have the following conditions/diagnoses, included as high risk conditions per the Centers for Disease Control and Prevention at CDC.gov.      Hyperlipidemia         COVID-19 is a new disease and there is limited information regarding risk factors for severe disease. Based on currently available information and clinical expertise, older adults and people of any age who have serious underlying medical conditions might be at higher risk for severe illness from COVID-19.      It is my medical opinion that you should avoid close contact with others and work from home if possible during this COVID-19 pandemic until June 10th, 2021.            Tim Hill MD    [facility]

## 2021-04-28 NOTE — TELEPHONE ENCOUNTER
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 Any questions, pt can be reached [phone].  Florence Mei,

## 2021-05-17 ENCOUNTER — TELEPHONE (OUTPATIENT)
Dept: FAMILY MEDICINE | Facility: CLINIC | Age: 51
End: 2021-05-17

## 2021-05-17 ENCOUNTER — NURSE TRIAGE (OUTPATIENT)
Dept: NURSING | Facility: CLINIC | Age: 51
End: 2021-05-17

## 2021-05-17 DIAGNOSIS — Z11.52 ENCOUNTER FOR SCREENING FOR COVID-19: Primary | ICD-10-CM

## 2021-05-17 NOTE — TELEPHONE ENCOUNTER
S/w pt and advised Dr. Hill ordered covid testing for both her and her .  They will receive a call to schedule the test.    Pt states understanding.    Bárbara ROBERTS RN  EP Triage

## 2021-05-17 NOTE — TELEPHONE ENCOUNTER
Triage Call:    -Patient calling as she is confused regarding the airline stating she needs to be physically tested for COVID PCR swab within 72 hours. The earliest patient should be getting tested is Thursday prior to her Monday flight has it can take up to 72 hours to have results come back. If patient has COVID-19 PCR test scheduled on Thursday at 10:10am, results can take up to 72 hours, so Sunday at 10:10am is when the results will come back to the latest. If patient gets tested on the Friday prior, results could come back to the latest on Monday, which is the day patient is flying.    RN advised that patient needs to confirm with airlines rules regarding when to exactly get COVID-19 test and ask if it is based on when test has resulted or based on when patient physically gets swabbed.     -Patient will call to confirm with airline. Patient and her  have mychart as well, so results should be sent directly to their mychart. RN advised if patient has any other questions or concerns to call back nurse line. Patient verbalized understanding and agrees with plan.     Sania Santiago RN, BSN Nurse Triage Advisor 1:17 PM 5/17/2021     Additional Information    General information question, no triage required and triager able to answer question    Protocols used: INFORMATION ONLY CALL-A-    COVID 19 Nurse Triage Plan/Patient Instructions    Please be aware that novel coronavirus (COVID-19) may be circulating in the community. If you develop symptoms such as fever, cough, or SOB or if you have concerns about the presence of another infection including coronavirus (COVID-19), please contact your health care provider or visit [email or web address].     Disposition/Instructions    Additional COVID19 information to add for patients.   How can I protect others?  If you have symptoms (fever, cough, body aches or trouble breathing): Stay home and away from others (self-isolate) until:    At least 10 days  "have passed since your symptoms started, And     You ve had no fever--and no medicine that reduces fever--for 1 full day (24 hours), And      Your other symptoms have resolved (gotten better).     If you don t have symptoms, but a test showed that you have COVID-19 (you tested positive):    Stay home and away from others (self-isolate). Follow the tips under \"How do I self-isolate?\" below for 10 days (20 days if you have a weak immune system).    You don't need to be retested for COVID-19 before going back to school or work. As long as you're fever-free and feeling better, you can go back to school, work and other activities after waiting the 10 or 20 days.     How do I self-isolate?    Stay in your own room, even for meals. Use your own bathroom if you can.     Stay away from others in your home. No hugging, kissing or shaking hands. No visitors.    Don t go to work, school or anywhere else.     Clean  high touch  surfaces often (doorknobs, counters, handles, etc.). Use a household cleaning spray or wipes. You ll find a full list on the EPA website:  www.epa.gov/pesticide-registration/list-n-disinfectants-use-against-sars-cov-2.    Cover your mouth and nose with a mask, tissue or washcloth to avoid spreading germs.    Wash your hands and face often. Use soap and water.    Caregivers in these groups are at risk for severe illness due to COVID-19:  o People 65 years and older  o People who live in a nursing home or long-term care facility  o People with chronic disease (lung, heart, cancer, diabetes, kidney, liver, immunologic)  o People who have a weakened immune system, including those who:  - Are in cancer treatment  - Take medicine that weakens the immune system, such as corticosteroids  - Had a bone marrow or organ transplant  - Have an immune deficiency  - Have poorly controlled HIV or AIDS  - Are obese (body mass index of 40 or higher)  - Smoke regularly    Caregivers should wear gloves while washing dishes, " handling laundry and cleaning bedrooms and bathrooms.    Use caution when washing and drying laundry: Don t shake dirty laundry, and use the warmest water setting that you can.    For more tips, go to www.cdc.gov/coronavirus/2019-ncov/downloads/10Things.pdf.    How can I take care of myself?  1. Get lots of rest. Drink extra fluids (unless a doctor has told you not to).     2. Take Tylenol (acetaminophen) for fever or pain. If you have liver or kidney problems, ask your family doctor if it s okay to take Tylenol.     Adults can take either:     650 mg (two 325 mg pills) every 4 to 6 hours, or     1,000 mg (two 500 mg pills) every 8 hours as needed.     Note: Don t take more than 3,000 mg in one day.   Acetaminophen is found in many medicines (both prescribed and over-the-counter medicines). Read all labels to be sure you don t take too much.     For children, check the Tylenol bottle for the right dose. The dose is based on the child s age or weight.    3. If you have other health problems (like cancer, heart failure, an organ transplant or severe kidney disease): Call your specialty clinic if you don t feel better in the next 2 days.    4. Know when to call 911: Emergency warning signs include:    Trouble breathing or shortness of breath    Pain or pressure in the chest that doesn t go away    Feeling confused like you haven t felt before, or not being able to wake up    Bluish-colored lips or face    What are the symptoms of COVID-19?     The most common symptoms are cough, fever and trouble breathing.     Less common symptoms include body aches, chills, diarrhea (loose, watery poops), fatigue (feeling very tired), headache, runny nose, sore throat and loss of smell.    COVID-19 can cause severe coughing (bronchitis) and lung infection (pneumonia).    How does it spread?     The virus may spread when a person coughs or sneezes into the air. The virus can travel about 6 feet this way, and it can live on surfaces.       Common  (household disinfectants) will kill the virus.    Who is at risk?  Anyone can catch COVID-19 if they re around someone who has the virus.    How can others protect themselves?     Stay away from people who have COVID-19 (or symptoms of COVID-19).    Wash hands often with soap and water. Or, use hand  with at least 60% alcohol.    Avoid touching the eyes, nose or mouth.     Wear a face mask when you go out in public, when sick or when caring for a sick person.    Where can I get more information?     [facility]: About COVID-19: [email or web address]    CDC: What to Do If You re Sick: www.cdc.gov/coronavirus/2019-ncov/about/steps-when-sick.html    CDC: Ending Home Isolation: www.cdc.gov/coronavirus/2019-ncov/hcp/disposition-in-home-patients.html     CDC: Caring for Someone: www.cdc.gov/coronavirus/2019-ncov/if-you-are-sick/care-for-someone.html     [facility]: Interim Guidance for Hospital Discharge to Home: www.health.[facility].mn./diseases/coronavirus/hcp/hospdischarge.pdf    [facility] clinical trials (COVID-19 research studies): clinicalaffairs.[facility]-clinical-trials     Below are the COVID-19 hotlines at the [place] [facility] ([facility]). Interpreters are available.   o For health questions: Call 029-760-9889 or 1-435.614.7848 (7 a.m. to 7 p.m.)  o For questions about schools and childcare: Call 312-637-6834 or 1-707.417.9796 (7 a.m. to 7 p.m.)          Thank you for taking steps to prevent the spread of this virus.  o Limit your contact with others.  o Wear a simple mask to cover your cough.  o Wash your hands well and often.    Resources    [facility]: About COVID-19: [email or web address]    CDC: What to Do If You're Sick: www.cdc.gov/coronavirus/2019-ncov/about/steps-when-sick.html    CDC: Ending Home Isolation: www.cdc.gov/coronavirus/2019-ncov/hcp/disposition-in-home-patients.html     CDC: Caring for Someone:  www.cdc.gov/coronavirus/2019-ncov/if-you-are-sick/care-for-someone.html     [facility]: Interim Guidance for Hospital Discharge to Home: www.health.[facility].[place].us/diseases/coronavirus/hcp/hospdischarge.pdf    [facility] clinical trials (COVID-19 research studies): clinicalaffairs.[facility]-clinical-trials     Below are the COVID-19 hotlines at the [place] Department [facility] ([facility]). Interpreters are available.   o For health questions: Call 332-772-9215 or 1-576.587.8191 (7 a.m. to 7 p.m.)  o For questions about schools and childcare: Call 541-389-3969 or 1-713.782.2291 (7 a.m. to 7 p.m.)

## 2021-05-17 NOTE — TELEPHONE ENCOUNTER
Pt calling states is traveling on Monday 5/24 and needs a covid PCR test done.  Pt states they are flying to [place] and they have special requirements.  Pt is fully vaccinated and last shot was 3/4/21.    covid PCR test order pended.    Pt can be reached at [phone].    Bárbara ROBERTS RN  EP Triage

## 2021-05-20 ENCOUNTER — OFFICE VISIT (OUTPATIENT)
Dept: FAMILY MEDICINE | Facility: CLINIC | Age: 51
End: 2021-05-20
Payer: COMMERCIAL

## 2021-05-20 ENCOUNTER — TELEPHONE (OUTPATIENT)
Dept: FAMILY MEDICINE | Facility: CLINIC | Age: 51
End: 2021-05-20

## 2021-05-20 VITALS
WEIGHT: 176 LBS | OXYGEN SATURATION: 99 % | HEART RATE: 71 BPM | HEIGHT: 63 IN | RESPIRATION RATE: 12 BRPM | SYSTOLIC BLOOD PRESSURE: 118 MMHG | BODY MASS INDEX: 31.18 KG/M2 | DIASTOLIC BLOOD PRESSURE: 74 MMHG | TEMPERATURE: 97.9 F

## 2021-05-20 DIAGNOSIS — Z11.52 ENCOUNTER FOR SCREENING FOR COVID-19: ICD-10-CM

## 2021-05-20 DIAGNOSIS — R00.2 PALPITATIONS: ICD-10-CM

## 2021-05-20 DIAGNOSIS — Z11.52 ENCOUNTER FOR SCREENING FOR COVID-19: Primary | ICD-10-CM

## 2021-05-20 DIAGNOSIS — M79.89 SWELLING OF LIMB: Primary | ICD-10-CM

## 2021-05-20 DIAGNOSIS — E05.90 HYPERTHYROIDISM: ICD-10-CM

## 2021-05-20 LAB
ERYTHROCYTE [DISTWIDTH] IN BLOOD BY AUTOMATED COUNT: 12.3 % (ref 10–15)
HCT VFR BLD AUTO: 39.1 % (ref 35–47)
HGB BLD-MCNC: 13.6 G/DL (ref 11.7–15.7)
LABORATORY COMMENT REPORT: NORMAL
MCH RBC QN AUTO: 31.3 PG (ref 26.5–33)
MCHC RBC AUTO-ENTMCNC: 34.8 G/DL (ref 31.5–36.5)
MCV RBC AUTO: 90 FL (ref 78–100)
PLATELET # BLD AUTO: 306 10E9/L (ref 150–450)
RBC # BLD AUTO: 4.35 10E12/L (ref 3.8–5.2)
SARS-COV-2 RNA RESP QL NAA+PROBE: NEGATIVE
SARS-COV-2 RNA RESP QL NAA+PROBE: NORMAL
SPECIMEN SOURCE: NORMAL
SPECIMEN SOURCE: NORMAL
WBC # BLD AUTO: 7.2 10E9/L (ref 4–11)

## 2021-05-20 PROCEDURE — 99214 OFFICE O/P EST MOD 30 MIN: CPT | Performed by: FAMILY MEDICINE

## 2021-05-20 PROCEDURE — 84439 ASSAY OF FREE THYROXINE: CPT | Performed by: FAMILY MEDICINE

## 2021-05-20 PROCEDURE — 36415 COLL VENOUS BLD VENIPUNCTURE: CPT | Performed by: FAMILY MEDICINE

## 2021-05-20 PROCEDURE — U0003 INFECTIOUS AGENT DETECTION BY NUCLEIC ACID (DNA OR RNA); SEVERE ACUTE RESPIRATORY SYNDROME CORONAVIRUS 2 (SARS-COV-2) (CORONAVIRUS DISEASE [COVID-19]), AMPLIFIED PROBE TECHNIQUE, MAKING USE OF HIGH THROUGHPUT TECHNOLOGIES AS DESCRIBED BY CMS-2020-01-R: HCPCS | Performed by: FAMILY MEDICINE

## 2021-05-20 PROCEDURE — U0005 INFEC AGEN DETEC AMPLI PROBE: HCPCS | Performed by: FAMILY MEDICINE

## 2021-05-20 PROCEDURE — 84443 ASSAY THYROID STIM HORMONE: CPT | Performed by: FAMILY MEDICINE

## 2021-05-20 PROCEDURE — 80053 COMPREHEN METABOLIC PANEL: CPT | Performed by: FAMILY MEDICINE

## 2021-05-20 PROCEDURE — 85027 COMPLETE CBC AUTOMATED: CPT | Performed by: FAMILY MEDICINE

## 2021-05-20 ASSESSMENT — PAIN SCALES - GENERAL: PAINLEVEL: MILD PAIN (3)

## 2021-05-20 ASSESSMENT — MIFFLIN-ST. JEOR: SCORE: 1382.46

## 2021-05-20 NOTE — PROGRESS NOTES
"    Assessment & Plan     Swelling of limb  Worsening for last 2 - 3 weeks with tiredness and palpitation   Will have her to check lab and cardiac work up  - CBC with platelets  - Comprehensive metabolic panel (BMP + Alb, Alk Phos, ALT, AST, Total. Bili, TP)  - TSH  - T4, free  - Leadless EKG Monitor 3 to 7 Days; Future    Hyperthyroidism  Had sx mentioned above   Will have her to check lab for further evaluation   - CBC with platelets  - Comprehensive metabolic panel (BMP + Alb, Alk Phos, ALT, AST, Total. Bili, TP)  - TSH  - T4, free  - Leadless EKG Monitor 3 to 7 Days; Future    Palpitations  Has no CP/SOB, her BP is normal   Will review the Zio patch results for further evaluation   - Leadless EKG Monitor 3 to 7 Days; Future           FUTURE APPOINTMENTS:       - Follow-up visit in 2 weeks after lab and cardiac work up done     No follow-ups on file.    Tim Hill MD  [facility] YEISON Isaacs is a 50 year old who presents for the following health issues     HPI     Concern - swollen feet, hands  Onset: ongoing for at least 2 days  Description: swelling feet and hands  Intensity: moderate, severe  Progression of Symptoms:  worsening  Accompanying Signs & Symptoms: feeling heavy and hard time walking  Previous history of similar problem: not to the extent it is currently  Precipitating factors:        Worsened by: nothing  Alleviating factors:        Improved by: nothing  Therapies tried and outcome: foot elevation        Review of Systems   Constitutional, HEENT, cardiovascular, pulmonary, gi and gu systems are negative, except as otherwise noted.      Objective    /74   Pulse 71   Temp 97.9  F (36.6  C) (Tympanic)   Resp 12   Ht 1.6 m (5' 3\")   Wt 79.8 kg (176 lb)   LMP 10/26/2014   SpO2 99%   BMI 31.18 kg/m    Body mass index is 31.18 kg/m .  Physical Exam   GENERAL: healthy, alert and no distress  EYES: Eyes grossly normal to inspection, PERRL and conjunctivae " and sclerae normal  HENT: ear canals and TM's normal, nose and mouth without ulcers or lesions  NECK: no adenopathy, no asymmetry, masses, or scars and thyroid normal to palpation  RESP: lungs clear to auscultation - no rales, rhonchi or wheezes  CV: regular rate and rhythm, normal S1 S2, no S3 or S4, no murmur, click or rub, no peripheral edema and peripheral pulses strong  ABDOMEN: soft, nontender, no hepatosplenomegaly, no masses and bowel sounds normal  MS: no gross musculoskeletal defects noted, no edema  SKIN: no suspicious lesions or rashes

## 2021-05-20 NOTE — TELEPHONE ENCOUNTER
Pt requesting another Covid 19 test because of missed 72 hour time frame and she will be traveling 5/24. She can be reached at [phone] and she would like to be called for an appt once approved at [facility].   Thank you,   Mariluz Hopson

## 2021-05-21 DIAGNOSIS — Z11.52 ENCOUNTER FOR SCREENING FOR COVID-19: ICD-10-CM

## 2021-05-21 LAB
ALBUMIN SERPL-MCNC: 3.7 G/DL (ref 3.4–5)
ALP SERPL-CCNC: 35 U/L (ref 40–150)
ALT SERPL W P-5'-P-CCNC: 29 U/L (ref 0–50)
ANION GAP SERPL CALCULATED.3IONS-SCNC: 6 MMOL/L (ref 3–14)
AST SERPL W P-5'-P-CCNC: 20 U/L (ref 0–45)
BILIRUB SERPL-MCNC: 0.4 MG/DL (ref 0.2–1.3)
BUN SERPL-MCNC: 10 MG/DL (ref 7–30)
CALCIUM SERPL-MCNC: 8.9 MG/DL (ref 8.5–10.1)
CHLORIDE SERPL-SCNC: 106 MMOL/L (ref 94–109)
CO2 SERPL-SCNC: 24 MMOL/L (ref 20–32)
CREAT SERPL-MCNC: 0.6 MG/DL (ref 0.52–1.04)
GFR SERPL CREATININE-BSD FRML MDRD: >90 ML/MIN/{1.73_M2}
GLUCOSE SERPL-MCNC: 72 MG/DL (ref 70–99)
LABORATORY COMMENT REPORT: NORMAL
POTASSIUM SERPL-SCNC: 4.3 MMOL/L (ref 3.4–5.3)
PROT SERPL-MCNC: 7.1 G/DL (ref 6.8–8.8)
SARS-COV-2 RNA RESP QL NAA+PROBE: NEGATIVE
SARS-COV-2 RNA RESP QL NAA+PROBE: NORMAL
SODIUM SERPL-SCNC: 136 MMOL/L (ref 133–144)
SPECIMEN SOURCE: NORMAL
SPECIMEN SOURCE: NORMAL
T4 FREE SERPL-MCNC: 1.06 NG/DL (ref 0.76–1.46)
TSH SERPL DL<=0.005 MIU/L-ACNC: 0.84 MU/L (ref 0.4–4)

## 2021-05-21 PROCEDURE — U0003 INFECTIOUS AGENT DETECTION BY NUCLEIC ACID (DNA OR RNA); SEVERE ACUTE RESPIRATORY SYNDROME CORONAVIRUS 2 (SARS-COV-2) (CORONAVIRUS DISEASE [COVID-19]), AMPLIFIED PROBE TECHNIQUE, MAKING USE OF HIGH THROUGHPUT TECHNOLOGIES AS DESCRIBED BY CMS-2020-01-R: HCPCS | Performed by: FAMILY MEDICINE

## 2021-05-21 PROCEDURE — U0005 INFEC AGEN DETEC AMPLI PROBE: HCPCS | Performed by: FAMILY MEDICINE

## 2021-05-23 ENCOUNTER — NURSE TRIAGE (OUTPATIENT)
Dept: NURSING | Facility: CLINIC | Age: 51
End: 2021-05-23

## 2021-05-23 ENCOUNTER — MYC MEDICAL ADVICE (OUTPATIENT)
Dept: NURSING | Facility: CLINIC | Age: 51
End: 2021-05-23

## 2021-05-23 NOTE — TELEPHONE ENCOUNTER
"Patient calling back regarding negative covid letter needed for travel to [place]. \"The letter is dated for 5/20, I need 5/21. Gave information per epic. Pt will try to screen shot a copy of her results  Jessica Rosa RN [facility]      "

## 2021-05-23 NOTE — TELEPHONE ENCOUNTER
Patient needs her negative COVID-19 letter from test date 5/21/21. Advised to talk to HIM and [facility] Results team to see if someone can create that letter for her.  Patient verbalized understanding.    Additional Information    Negative: RN needs further essential information from caller in order to complete triage    Negative: Requesting regular office appointment    Negative: [1] Caller requesting NON-URGENT health information AND [2] PCP's office is the best resource    Health Information question, no triage required and triager able to answer question    Protocols used: INFORMATION ONLY CALL-A-AH

## 2021-06-22 NOTE — PROGRESS NOTES
Shital is a 50 year old  female who presents for annual exam.     Besides routine health maintenance, she has no other health concerns today     HPI:  The patient's PCP is Tim Hill MD.      Patient is doing overall really well. Is a  at [employer] and does some class teaching as well. 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 Has had a hard time finding balance with working and school. Was very regularly exercising before going back to school and now hasn't been doing it nearly as much. Got a treadmill during covid when the gyms closed and really likes having it there even though did also like going to the gym. Much less time involved in just getting on the treadmill, just hasn't been as committed to making the time for it like she was, though doing it somewhat. Weight is fairly stable from 9 months ago when here last. Thinks she may quit her job in the near future and just focus on school only b/c will have some in person classes which will make it too hard to balance both but not sure yet when she'll do that.    [place]. kendrick is still living with them but just got a job a month ago and so she is working on moving out and getting her own place.  is still working from home and likes having him there for company and to help her do some things around the house.    Still doing her estrogen patch and all her V.M sx are well controlled now. Had a LASH  for fibroids/bleeding/pain issues but menopausal sx have been in the last 2-3 yrs for when she started on ERT.    Patient is still being followed by endo for her hyperthyroid and is well controlled on low dose methimazole.    Had high cholesterol last year. LDL was 180's. Had recommended starting on meds but she didn't and wanted to try to work on diet changes as was already exercising and losing a bit of weight with that. Is  fasting today to have it repeated. Had cmp, TFTs and cbc just last month and those were normal. Has had elevated fasting sugars in the past, though minimally, but glucose was normal     Has mammo today and had colonoscopy  and needs recheck at 5 yrs      GYNECOLOGIC HISTORY:    Patient's last menstrual period was 10/26/2014.    Her current contraception method is: hysterectomy.  She  reports that she has quit smoking. Her smoking use included cigarettes. She smoked 0.00 packs per day for 0.00 years. She has never used smokeless tobacco.    Patient is sexually active.  STD testing offered?  Declined     Last PHQ-9 score on record =   PHQ-9 SCORE 2021   PHQ-9 Total Score 0     Last GAD7 score on record =   RACHEL-7 SCORE 2021   Total Score 0     Alcohol Score = 2    HEALTH MAINTENANCE:  Cholesterol:   Recent Labs   Lab Test 20  0811 20  0805 09/11/15  1115 14  1201   CHOL 256* 280* 184 192   HDL 54 57 52 52   * 205* 125 125   TRIG 94 89 35 77   CHOLHDLRATIO  --   --  3.5 3.7    < > = values in this interval not displayed.     TSH   Date Value Ref Range Status   2021 0.84 0.40 - 4.00 mU/L Final     Last Mammo: 20, Result: Normal, Next Mammo: Today   Pap:   Lab Results   Component Value Date    PAP NIL NEG-HPV 07/15/2019    PAP NIL 10/23/2012     Colonoscopy:  19, Result: Normal, repeat 5 years, Next Colonoscopy:   Dexa:  N/A    Health maintenance updated:  yes    HISTORY:  OB History    Para Term  AB Living   6 2 2 0 4 2   SAB TAB Ectopic Multiple Live Births   2 2 0 0 2      # Outcome Date GA Lbr Rajesh/2nd Weight Sex Delivery Anes PTL Lv   6 Term 10/01/98    F CS-LTranv   ARASELI      Name: Robin   5 Term 96    F CS-LTranv   ARASELI      Name: Nefeli   4 TAB            3 TAB            2 SAB            1 SAB                Patient Active Problem List   Diagnosis     Hyperthyroidism     S/P abdominal supracervical subtotal hysterectomy      Elevated fasting glucose     Pure hypercholesterolemia     Post-menopause on HRT (hormone replacement therapy)     Symptoms, such as flushing, sleeplessness, headache, lack of concentration, associated with the menopause     Past Surgical History:   Procedure Laterality Date     APPENDECTOMY        SECTION       CHOLECYSTECTOMY, LAPOROSCOPIC  2012    Cholecystectomy, Laparoscopic     COLONOSCOPY N/A 2017    Procedure: COLONOSCOPY;  COLONOSCOPY;  Surgeon: Shannon Noel MD;  Location:  GI     COLONOSCOPY N/A 2019    Procedure: COLONOSCOPY;  Surgeon: Shannon Noel MD;  Location:  GI     HYSTERECTOMY SUPRACERVICAL N/A 2014    Procedure: HYSTERECTOMY SUPRACERVICAL;  Surgeon: Shauna Arceo MD;  Location:  OR [facility] and [facility] done by Adis/ Leora. started as laparoscopic but converted to open due to adhesions     LAPAROSCOPIC CYSTECTOMY OVARIAN (ONCOLOGY)      right. benign cyst. partial oopherectomy in [facility]     LAPAROSCOPIC SALPINGO-OOPHORECTOMY  14     MYOMECTOMY UTERUS  2008    hysteroscopic     TONSILLECTOMY  1979      Social History     Tobacco Use     Smoking status: Former Smoker     Packs/day: 0.00     Years: 0.00     Pack years: 0.00     Types: Cigarettes     Smokeless tobacco: Never Used     Tobacco comment: Quit 31+ years ago.   Substance Use Topics     Alcohol use: Yes     Alcohol/week: 0.0 standard drinks     Comment: Socially       Problem (# of Occurrences) Relation (Name,Age of Onset)    Breast Cancer (1) Maternal Aunt    Cancer (1) Mother (Lyndsey Wolff): melanoma    Colon Polyps (1) Mother (Lyndsey Wolff)    Coronary Artery Disease (1) Father (Arnie Wheatley)    Diabetes (2) Mother (Lyndsey Wolff), Maternal Grandmother (Mallorie Wolff)    No Known Problems (4) Sister, Brother, Maternal Grandfather, Paternal Grandmother    Thyroid Disease (1) Cousin (Vianey Gruber)       Negative family history of: Asthma            Current Outpatient Medications  "  Medication Sig     cholecalciferol (VITAMIN D3) 125 mcg (5000 units) capsule      ciclopirox (PENLAC) 8 % external solution Apply 8 Application topically as needed To nails nails     EPIDUO FORTE 0.3-2.5 % gel Apply 0.3 Pump topically as needed     estradiol (VIVELLE-DOT) 0.1 MG/24HR bi-weekly patch Place 1 patch onto the skin twice a week     levocetirizine (XYZAL) 5 MG tablet Take 5 mg by mouth every evening     METHIMAZOLE PO Take 2.5 mg by mouth daily     olopatadine (PATANOL) 0.1 % ophthalmic solution INSTILL 1 DROP INTO BOTH EYES TWICE A DAY     Omega-3 Fatty Acids (FISH OIL) 1200 MG capsule      XIIDRA 5 % opthalmic solution Apply 5 Application topically as needed     atorvastatin (LIPITOR) 20 MG tablet Take 1 tablet (20 mg) by mouth daily (Patient not taking: Reported on 1/4/2021)     montelukast (SINGULAIR) 10 MG tablet Take 1 tablet (10 mg) by mouth At Bedtime (Patient not taking: Reported on 9/11/2020)     No current facility-administered medications for this visit.      Allergies   Allergen Reactions     Penicillins      Seasonal Allergies      Amoxicillin Rash       Past medical, surgical, social and family histories were reviewed and updated in EPIC.    ROS:   12 point review of systems negative other than symptoms noted below or in the HPI.  No urinary frequency or dysuria, bladder or kidney problems    EXAM:  /64   Ht 1.6 m (5' 3\")   Wt 78.9 kg (174 lb)   LMP 10/26/2014   BMI 30.82 kg/m     BMI: Body mass index is 30.82 kg/m .    PHYSICAL EXAM:  Constitutional:   Appearance: Well nourished, well developed, alert, in no acute distress  Neck:  Lymph Nodes:  No lymphadenopathy present    Thyroid:  Gland size normal, nontender, no nodules or masses present  on palpation  Chest:  Respiratory Effort:  Breathing unlabored  Cardiovascular:    Heart: Auscultation:  Regular rate, normal rhythm, no murmurs present  Breasts: Palpation of Breasts and Axillae:  No masses present on palpation, no breast " tenderness., Axillary Lymph Nodes:  No lymphadenopathy present. and No nodularity, asymmetry or nipple discharge bilaterally.  Gastrointestinal:   Abdominal Examination:  Abdomen nontender to palpation, tone normal without rigidity or guarding, no masses present, umbilicus without lesions   Liver and Spleen:  No hepatomegaly present, liver nontender to palpation    Hernias:  No hernias present  Lymphatic: Lymph Nodes:  No other lymphadenopathy present  Skin:  General Inspection:  No rashes present, no lesions present, no areas of  discoloration  Neurologic:    Mental Status:  Oriented X3.  Normal strength and tone, sensory exam                grossly normal, mentation intact and speech normal.    Psychiatric:   Mentation appears normal and affect normal/bright.         Pelvic Exam:  External Genitalia:     Normal appearance for age, no discharge present, no tenderness present, no inflammatory lesions present, color normal  Vagina:     Normal vaginal vault without central or paravaginal defects, no discharge present, no inflammatory lesions present, no masses present  Bladder:     Nontender to palpation  Urethra:   Urethral Body:  Urethra palpation normal, urethra structural support normal   Urethral Meatus:  No erythema or lesions present  Cervix:     Appearance healthy, no lesions present, nontender to palpation, no bleeding present  Uterus:     Surgically absent  Adnexa:     No adnexal tenderness present, no adnexal masses present  Perineum:     Perineum within normal limits, no evidence of trauma, no rashes or skin lesions present  Anus:     Anus within normal limits, no hemorrhoids present  Inguinal Lymph Nodes:     No lymphadenopathy present  Pubic Hair:     Normal pubic hair distribution for age  Genitalia and Groin:     No rashes present, no lesions present, no areas of discoloration, no masses present      COUNSELING:   Reviewed preventive health counseling, as reflected in patient instructions  Special  attention given to:        Regular exercise       Healthy diet/nutrition       (Esther)menopause management    BMI: Body mass index is 30.82 kg/m .  Weight management plan: Discussed healthy diet and exercise guidelines    ASSESSMENT:  50 year old female with satisfactory annual exam.    ICD-10-CM    1. Encounter for gynecological examination without abnormal finding  Z01.419    2. Symptoms, such as flushing, sleeplessness, headache, lack of concentration, associated with the menopause  N95.1 estradiol (VIVELLE-DOT) 0.1 MG/24HR bi-weekly patch   3. Post-menopause on HRT (hormone replacement therapy)  Z79.890 estradiol (VIVELLE-DOT) 0.1 MG/24HR bi-weekly patch   4. Pure hypercholesterolemia  E78.00 Lipid panel reflex to direct LDL Fasting   5. Screening for metabolic disorder  Z13.228    6. Screening for thyroid disorder  Z13.29    7. Vitamin D deficiency  E55.9 Vitamin D Deficiency   8. Hyperthyroidism  E05.90        PLAN:  Pap is UTD for another 3 yrs  mammo today and colonoscopy in 2024  Should do a dexa in the next 1-2 yrs as will be likely about 3-4 yrs postmenopausal based on ERT need/V.M sx start given LASH several years prior  Reviewed her ERT and will refill it for the year    Fasting lipids and vitamin D today as that wasn't done when had her labs last year    Discussed diet and exercise. Should schedule treadmill time first thing in AM before work and her own school responsibilities take over and she doesn't get it done  Will recommit to that  Will follow up with her once her lipids are back and determine if needs to start statin or not, though with LDL in the 180s, and even at a BMI of <30 last year, likely will need to be on it.    Shauna Arceo MD

## 2021-06-23 ENCOUNTER — ANCILLARY PROCEDURE (OUTPATIENT)
Dept: MAMMOGRAPHY | Facility: CLINIC | Age: 51
End: 2021-06-23
Payer: COMMERCIAL

## 2021-06-23 ENCOUNTER — OFFICE VISIT (OUTPATIENT)
Dept: OBGYN | Facility: CLINIC | Age: 51
End: 2021-06-23
Payer: COMMERCIAL

## 2021-06-23 VITALS
SYSTOLIC BLOOD PRESSURE: 102 MMHG | BODY MASS INDEX: 30.83 KG/M2 | HEIGHT: 63 IN | WEIGHT: 174 LBS | DIASTOLIC BLOOD PRESSURE: 64 MMHG

## 2021-06-23 DIAGNOSIS — Z13.29 SCREENING FOR THYROID DISORDER: ICD-10-CM

## 2021-06-23 DIAGNOSIS — E55.9 VITAMIN D DEFICIENCY: ICD-10-CM

## 2021-06-23 DIAGNOSIS — Z13.228 SCREENING FOR METABOLIC DISORDER: ICD-10-CM

## 2021-06-23 DIAGNOSIS — N95.1 SYMPTOMS, SUCH AS FLUSHING, SLEEPLESSNESS, HEADACHE, LACK OF CONCENTRATION, ASSOCIATED WITH THE MENOPAUSE: ICD-10-CM

## 2021-06-23 DIAGNOSIS — E78.00 PURE HYPERCHOLESTEROLEMIA: ICD-10-CM

## 2021-06-23 DIAGNOSIS — Z01.419 ENCOUNTER FOR GYNECOLOGICAL EXAMINATION WITHOUT ABNORMAL FINDING: Primary | ICD-10-CM

## 2021-06-23 DIAGNOSIS — Z12.31 VISIT FOR SCREENING MAMMOGRAM: ICD-10-CM

## 2021-06-23 DIAGNOSIS — E05.90 HYPERTHYROIDISM: ICD-10-CM

## 2021-06-23 DIAGNOSIS — Z79.890 POST-MENOPAUSE ON HRT (HORMONE REPLACEMENT THERAPY): ICD-10-CM

## 2021-06-23 LAB
CHOLEST SERPL-MCNC: 268 MG/DL
DEPRECATED CALCIDIOL+CALCIFEROL SERPL-MC: 58 UG/L (ref 20–75)
HDLC SERPL-MCNC: 63 MG/DL
LDLC SERPL CALC-MCNC: 181 MG/DL
NONHDLC SERPL-MCNC: 205 MG/DL
TRIGL SERPL-MCNC: 119 MG/DL

## 2021-06-23 PROCEDURE — 80061 LIPID PANEL: CPT | Performed by: OBSTETRICS & GYNECOLOGY

## 2021-06-23 PROCEDURE — 99396 PREV VISIT EST AGE 40-64: CPT | Performed by: OBSTETRICS & GYNECOLOGY

## 2021-06-23 PROCEDURE — 77063 BREAST TOMOSYNTHESIS BI: CPT | Mod: TC | Performed by: RADIOLOGY

## 2021-06-23 PROCEDURE — 82306 VITAMIN D 25 HYDROXY: CPT | Performed by: OBSTETRICS & GYNECOLOGY

## 2021-06-23 PROCEDURE — 77067 SCR MAMMO BI INCL CAD: CPT | Mod: TC | Performed by: RADIOLOGY

## 2021-06-23 PROCEDURE — 36415 COLL VENOUS BLD VENIPUNCTURE: CPT | Performed by: OBSTETRICS & GYNECOLOGY

## 2021-06-23 RX ORDER — ESTRADIOL 0.1 MG/D
1 FILM, EXTENDED RELEASE TRANSDERMAL
Qty: 24 PATCH | Refills: 3 | Status: SHIPPED | OUTPATIENT
Start: 2021-06-24 | End: 2022-07-18

## 2021-06-23 ASSESSMENT — ANXIETY QUESTIONNAIRES
6. BECOMING EASILY ANNOYED OR IRRITABLE: NOT AT ALL
7. FEELING AFRAID AS IF SOMETHING AWFUL MIGHT HAPPEN: NOT AT ALL
2. NOT BEING ABLE TO STOP OR CONTROL WORRYING: NOT AT ALL
IF YOU CHECKED OFF ANY PROBLEMS ON THIS QUESTIONNAIRE, HOW DIFFICULT HAVE THESE PROBLEMS MADE IT FOR YOU TO DO YOUR WORK, TAKE CARE OF THINGS AT HOME, OR GET ALONG WITH OTHER PEOPLE: NOT DIFFICULT AT ALL
1. FEELING NERVOUS, ANXIOUS, OR ON EDGE: NOT AT ALL
GAD7 TOTAL SCORE: 0
5. BEING SO RESTLESS THAT IT IS HARD TO SIT STILL: NOT AT ALL
3. WORRYING TOO MUCH ABOUT DIFFERENT THINGS: NOT AT ALL

## 2021-06-23 ASSESSMENT — PATIENT HEALTH QUESTIONNAIRE - PHQ9
SUM OF ALL RESPONSES TO PHQ QUESTIONS 1-9: 0
5. POOR APPETITE OR OVEREATING: NOT AT ALL

## 2021-06-23 ASSESSMENT — MIFFLIN-ST. JEOR: SCORE: 1373.39

## 2021-06-24 ASSESSMENT — ANXIETY QUESTIONNAIRES: GAD7 TOTAL SCORE: 0

## 2021-06-26 PROBLEM — Z79.890 POST-MENOPAUSE ON HRT (HORMONE REPLACEMENT THERAPY): Status: ACTIVE | Noted: 2021-06-26

## 2021-06-26 PROBLEM — N95.1 SYMPTOMS, SUCH AS FLUSHING, SLEEPLESSNESS, HEADACHE, LACK OF CONCENTRATION, ASSOCIATED WITH THE MENOPAUSE: Status: ACTIVE | Noted: 2021-06-26

## 2021-06-26 PROBLEM — E78.00 PURE HYPERCHOLESTEROLEMIA: Status: ACTIVE | Noted: 2021-06-26

## 2021-10-02 ENCOUNTER — HEALTH MAINTENANCE LETTER (OUTPATIENT)
Age: 51
End: 2021-10-02

## 2021-10-03 DIAGNOSIS — Z79.890 POST-MENOPAUSE ON HRT (HORMONE REPLACEMENT THERAPY): ICD-10-CM

## 2021-10-03 DIAGNOSIS — N95.1 SYMPTOMS, SUCH AS FLUSHING, SLEEPLESSNESS, HEADACHE, LACK OF CONCENTRATION, ASSOCIATED WITH THE MENOPAUSE: ICD-10-CM

## 2021-10-04 RX ORDER — ESTRADIOL 0.1 MG/D
PATCH, EXTENDED RELEASE TRANSDERMAL
Qty: 8 PATCH | Refills: 11 | OUTPATIENT
Start: 2021-10-04

## 2021-10-04 NOTE — TELEPHONE ENCOUNTER
"Requested Prescriptions   Pending Prescriptions Disp Refills     VIVELLE-DOT 0.1 MG/24HR bi-weekly patch [Pharmacy Med Name: VIVELLE-DOT 0.1 MG PATCH] 8 patch 11     Sig: APPLY 1 PATCH TWICE A WEEK       Hormone Replacement Therapy Passed - 10/3/2021  9:10 AM        Passed - Blood pressure under 140/90 in past 12 months     BP Readings from Last 3 Encounters:   06/23/21 102/64   05/20/21 118/74   03/11/21 110/80                 Passed - Recent (12 mo) or future (30 days) visit within the authorizing provider's specialty     Patient has had an office visit with the authorizing provider or a provider within the authorizing providers department within the previous 12 mos or has a future within next 30 days. See \"Patient Info\" tab in inbasket, or \"Choose Columns\" in Meds & Orders section of the refill encounter.              Passed - Patient has mammogram in past 2 years on file if age 50-75        Passed - Medication is active on med list        Passed - Patient is 18 years of age or older        Passed - No active pregnancy on record        Passed - No positive pregnancy test on record in past 12 months           Last Written Prescription Date:  6/23/21  Last Fill Quantity: 24,  # refills: 3   Last office visit: 6/23/2021 with prescribing provider:  Dr Arceo   Future Office Visit:      Refill request refused due to :   [x] Refills available at pharmacy.  Request sent back to pharmacy as \"duplicate\"  [] Patient report no longer needing it  [] Medication discontinued       Nishi Moore RN on 10/4/2021 at 9:18 AM            "

## 2021-12-08 ENCOUNTER — TELEPHONE (OUTPATIENT)
Dept: FAMILY MEDICINE | Facility: CLINIC | Age: 51
End: 2021-12-08
Payer: COMMERCIAL

## 2021-12-08 NOTE — TELEPHONE ENCOUNTER
Patient states that she had contact with someone who had contact with a COVID 19 patient.     Informed the patient that situation is not an exposure to COVID 19.     Advised that patient should monitor for COVID 19 even if fully vaccinated and be tested if concerned.  Juliane Curtis RN

## 2021-12-13 ENCOUNTER — LAB (OUTPATIENT)
Dept: LAB | Facility: CLINIC | Age: 51
End: 2021-12-13
Payer: COMMERCIAL

## 2021-12-13 DIAGNOSIS — Z13.6 CARDIOVASCULAR SCREENING; LDL GOAL LESS THAN 160: ICD-10-CM

## 2021-12-13 DIAGNOSIS — E78.5 HYPERLIPIDEMIA LDL GOAL <100: ICD-10-CM

## 2021-12-13 DIAGNOSIS — E78.00 PURE HYPERCHOLESTEROLEMIA: ICD-10-CM

## 2021-12-13 PROCEDURE — 80061 LIPID PANEL: CPT

## 2021-12-13 PROCEDURE — 36415 COLL VENOUS BLD VENIPUNCTURE: CPT

## 2021-12-13 PROCEDURE — 80053 COMPREHEN METABOLIC PANEL: CPT

## 2021-12-14 LAB
ALBUMIN SERPL-MCNC: 3.4 G/DL (ref 3.4–5)
ALP SERPL-CCNC: 41 U/L (ref 40–150)
ALT SERPL W P-5'-P-CCNC: 24 U/L (ref 0–50)
ANION GAP SERPL CALCULATED.3IONS-SCNC: 5 MMOL/L (ref 3–14)
AST SERPL W P-5'-P-CCNC: 13 U/L (ref 0–45)
BILIRUB SERPL-MCNC: 0.4 MG/DL (ref 0.2–1.3)
BUN SERPL-MCNC: 15 MG/DL (ref 7–30)
CALCIUM SERPL-MCNC: 8.6 MG/DL (ref 8.5–10.1)
CHLORIDE BLD-SCNC: 105 MMOL/L (ref 94–109)
CO2 SERPL-SCNC: 25 MMOL/L (ref 20–32)
CREAT SERPL-MCNC: 0.62 MG/DL (ref 0.52–1.04)
GFR SERPL CREATININE-BSD FRML MDRD: >90 ML/MIN/1.73M2
GLUCOSE BLD-MCNC: 91 MG/DL (ref 70–99)
POTASSIUM BLD-SCNC: 4.2 MMOL/L (ref 3.4–5.3)
PROT SERPL-MCNC: 7.2 G/DL (ref 6.8–8.8)
SODIUM SERPL-SCNC: 135 MMOL/L (ref 133–144)

## 2021-12-17 ENCOUNTER — MYC MEDICAL ADVICE (OUTPATIENT)
Dept: CARDIOLOGY | Facility: CLINIC | Age: 51
End: 2021-12-17
Payer: COMMERCIAL

## 2021-12-17 LAB
CHOLEST SERPL-MCNC: 213 MG/DL
FASTING STATUS PATIENT QL REPORTED: YES
HDLC SERPL-MCNC: 54 MG/DL
LDLC SERPL CALC-MCNC: 138 MG/DL
NONHDLC SERPL-MCNC: 159 MG/DL
TRIGL SERPL-MCNC: 107 MG/DL

## 2021-12-29 ENCOUNTER — TELEPHONE (OUTPATIENT)
Dept: CARDIOLOGY | Facility: CLINIC | Age: 51
End: 2021-12-29
Payer: COMMERCIAL

## 2021-12-29 NOTE — TELEPHONE ENCOUNTER
----- Message from Margareth Ovalles RN sent at 12/28/2021  2:17 PM CST -----  Regarding: RE: schedule  Hi Ester Judge, can you call her and ask her which one she wants. I talked to her today and she was happy to book on the Thursday.  Marina  ----- Message -----  From: Nu Piedra  Sent: 12/28/2021   1:13 PM CST  To: Margareth Ovalles RN, #  Subject: RE: schedule                                     Are we not having the one for jan 3rd that's scheduled already?    ----- Message -----  From: Margareth Ovalles RN  Sent: 12/28/2021  12:07 PM CST  To: Clinic Coordinators-Card-  Subject: schedule                                         Action Needed --  I've placed a hold that needs scheduling. Please see below.  Department: cardiology  Provider: Dr. Sepulveda  Date/Time: Thursday January 6 5:30 phone call   RFV: discuss results and daughters results   Referring Provider: Dr. Sepulveda      Patient aware of the appointment

## 2022-01-03 ENCOUNTER — VIRTUAL VISIT (OUTPATIENT)
Dept: CARDIOLOGY | Facility: CLINIC | Age: 52
End: 2022-01-03
Attending: INTERNAL MEDICINE
Payer: COMMERCIAL

## 2022-01-03 DIAGNOSIS — E78.01 FAMILIAL HYPERCHOLESTEREMIA: Primary | ICD-10-CM

## 2022-01-03 PROCEDURE — 99214 OFFICE O/P EST MOD 30 MIN: CPT | Mod: GT | Performed by: INTERNAL MEDICINE

## 2022-01-03 NOTE — LETTER
1/3/2022      RE: Shital Parrish  [address]  [place] [address]       Dear Colleague,    Thank you for the opportunity to participate in the care of your patient, Shital Parrish, at the [facility] [place] [facility]. Please see a copy of my visit note below.      HPI:     I had the privilege to evaluate and examine Ms Shital Parrish, who is 51 yr female patient with hypercholesterolemia.  Patient denies chest pain, shortness of breath and intermittent claudication.  Patient does not complaint about limitations during effort.  Patient has no palpitations.      PAST MEDICAL HISTORY:  Past Medical History:   Diagnosis Date     Edema 10/24/2013     Gastro-oesophageal reflux disease      Heavy periods 10/24/2013     Hyperthyroidism 9/26/2014    borderline-treated with a med for 6 months in [place] and then numbers improved. had a low TSH here and referred to [facility]. numbers there were borderline but normal and no antibodies. rec. 12/14-I123 uptake was increased so Graves dz dx'd. may do methimazole if repeat TFTs show hyperthyroid     Leiomyoma of uterus, unspecified 9/26/2014     Nipple discharge 09/14    right side only. neg mammo and right breast u/s. MRI pending     PONV (postoperative nausea and vomiting)      Post-menopause on HRT (hormone replacement therapy) 6/26/2021     Pure hypercholesterolemia 6/26/2021     Varicose veins of lower extremities with other complications 10/24/2013       CURRENT MEDICATIONS:  Current Outpatient Medications   Medication Sig Dispense Refill     cholecalciferol (VITAMIN D3) 125 mcg (5000 units) capsule        ciclopirox (PENLAC) 8 % external solution Apply 8 Application topically as needed To nails nails       EPIDUO FORTE 0.3-2.5 % gel Apply 0.3 Pump topically as needed       estradiol (VIVELLE-DOT) 0.1 MG/24HR bi-weekly patch Place 1 patch onto the skin twice a week 24 patch 3     METHIMAZOLE PO Take 2.5 mg by  mouth daily       olopatadine (PATANOL) 0.1 % ophthalmic solution INSTILL 1 DROP INTO BOTH EYES TWICE A DAY 5 mL 11     levocetirizine (XYZAL) 5 MG tablet Take 5 mg by mouth every evening       montelukast (SINGULAIR) 10 MG tablet Take 1 tablet (10 mg) by mouth At Bedtime (Patient not taking: Reported on 2020) 30 tablet 3     XIIDRA 5 % opthalmic solution Apply 5 Application topically as needed         PAST SURGICAL HISTORY:  Past Surgical History:   Procedure Laterality Date     APPENDECTOMY        SECTION       CHOLECYSTECTOMY, LAPOROSCOPIC  2012    Cholecystectomy, Laparoscopic     COLONOSCOPY N/A 2017    Procedure: COLONOSCOPY;  COLONOSCOPY;  Surgeon: Shannon Noel MD;  Location:  GI     COLONOSCOPY N/A 2019    Procedure: COLONOSCOPY;  Surgeon: Shannon Noel MD;  Location:  GI     HYSTERECTOMY SUPRACERVICAL N/A 2014    Procedure: HYSTERECTOMY SUPRACERVICAL;  Surgeon: Shauna Arceo MD;  Location:  OR [facility] and [facility] done by Adis/ Leora. started as laparoscopic but converted to open due to adhesions     LAPAROSCOPIC CYSTECTOMY OVARIAN (ONCOLOGY)      right. benign cyst. partial [facility]herectomy in [facility]     LAPAROSCOPIC SALPINGO-OOPHORECTOMY  14     MYOMECTOMY UTERUS      hysteroscopic     TONSILLECTOMY  1979       ALLERGIES     Allergies   Allergen Reactions     Penicillins      Seasonal Allergies      Amoxicillin Rash       FAMILY HISTORY:  Family History   Problem Relation Age of Onset     Diabetes Mother      Cancer Mother         melanoma     Colon Polyps Mother      Coronary Artery Disease Father      No Known Problems Sister      Diabetes Maternal Grandmother      Breast Cancer Maternal Aunt      Thyroid Disease Cousin      No Known Problems Brother      No Known Problems Maternal Grandfather      No Known Problems Paternal Grandmother      Asthma No family hx of        SOCIAL HISTORY:  Social History     Socioeconomic History      Marital status:      Spouse name: Marianna     Number of children: 2     Years of education: 16     Highest education level: None   Occupational History     Occupation: Program Leader     Comment: [employer]     Employer: [employer]    Tobacco Use     Smoking status: Former Smoker     Packs/day: 0.00     Years: 0.00     Pack years: 0.00     Types: Cigarettes     Smokeless tobacco: Never Used     Tobacco comment: Quit 31+ years ago.   Substance and Sexual Activity     Alcohol use: Yes     Alcohol/week: 0.0 standard drinks     Comment: Socially      Drug use: No     Sexual activity: Yes     Partners: Male     Birth control/protection: None     Comment: hysterectomy   Other Topics Concern      Service Not Asked     Blood Transfusions No     Caffeine Concern Not Asked     Occupational Exposure Not Asked     Hobby Hazards Not Asked     Sleep Concern Not Asked     Stress Concern Not Asked     Weight Concern Not Asked     Special Diet No     Back Care Not Asked     Exercise No     Bike Helmet Not Asked     Comment: NA     Seat Belt Yes     Self-Exams Not Asked     Parent/sibling w/ CABG, MI or angioplasty before 65F 55M? No   Social History Narrative    The patient was born in [facility]. She eats fruits and vegetables every day. She takes calcium supplement and vitamin D.        No advanced care directives on file         Social Determinants of Health     Financial Resource Strain: Not on file   Food Insecurity: Not on file   Transportation Needs: Not on file   Physical Activity: Not on file   Stress: Not on file   Social Connections: Not on file   Intimate Partner Violence: Not on file   Housing Stability: Not on file       ROS:   Constitutional: No fever, chills, or sweats. No weight gain/loss   ENT: No visual disturbance, ear ache, epistaxis, sore throat  Allergies/Immunologic: Negative.   Respiratory: No cough, hemoptysia  Cardiovascular: As per HPI  GI: No nausea, vomiting, hematemesis,  melena, or hematochezia  : No urinary frequency, dysuria, or hematuria  Integument: Negative  Psychiatric: Negative  Neuro: Negative  Endocrinology: Negative   Musculoskeletal: Negative    EXAM:  Virtual visit     Labs:  LIPID RESULTS:  Lab Results   Component Value Date    CHOL 213 (H) 12/13/2021    CHOL 268 (H) 06/23/2021    HDL 54 12/13/2021    HDL 63 06/23/2021     (H) 12/13/2021     (H) 06/23/2021    TRIG 107 12/13/2021    TRIG 119 06/23/2021    CHOLHDLRATIO 3.5 09/11/2015    NHDL 159 (H) 12/13/2021    NHDL 205 (H) 06/23/2021       LIVER ENZYME RESULTS:  Lab Results   Component Value Date    AST 13 12/13/2021    AST 20 05/20/2021    ALT 24 12/13/2021    ALT 29 05/20/2021       CBC RESULTS:  Lab Results   Component Value Date    WBC 7.2 05/20/2021    RBC 4.35 05/20/2021    HGB 13.6 05/20/2021    HCT 39.1 05/20/2021    MCV 90 05/20/2021    MCH 31.3 05/20/2021    MCHC 34.8 05/20/2021    RDW 12.3 05/20/2021     05/20/2021       BMP RESULTS:  Lab Results   Component Value Date     12/13/2021     05/20/2021    POTASSIUM 4.2 12/13/2021    POTASSIUM 4.3 05/20/2021    CHLORIDE 105 12/13/2021    CHLORIDE 106 05/20/2021    CO2 25 12/13/2021    CO2 24 05/20/2021    ANIONGAP 5 12/13/2021    ANIONGAP 6 05/20/2021    GLC 91 12/13/2021    GLC 72 05/20/2021    BUN 15 12/13/2021    BUN 10 05/20/2021    CR 0.62 12/13/2021    CR 0.60 05/20/2021    GFRESTIMATED >90 12/13/2021    GFRESTIMATED >90 05/20/2021    GFRESTBLACK >90 05/20/2021    BUZZ 8.6 12/13/2021    BUZZ 8.9 05/20/2021        A1C RESULTS:  Lab Results   Component Value Date    A1C 5.2 11/11/2020             Assessment and Plan:     I discussed the results with the patient.  I discussed the importance of a heart healthy diet and lifestyle.  We continue with the same medical regimen.  Follow-up within 1 year.    David Sepulveda MD, PhD  Professor of Medicine  Division of Cardiology    CC  Patient Care Team:  Tim Hill MD as PCP - General  (Family Practice)  Shauna Arceo MD as Assigned OBGYN Provider  Gricel Laws RN as Specialty Care Coordinator (Cardiology)

## 2022-01-03 NOTE — PROGRESS NOTES
Shital is a 51 year old who is being evaluated via a billable video visit.            Leonel Gold, Visit Facilitator/MA.        Video-Visit Details    Location:  Patient: Home  Dr Sepulveda: Office     Duration    Start: 6.00pm  Stop: 6.25 pm      Video System    [employer]    HPI:     I had the privilege to evaluate and examine Ms Shital Parrish, who is 51 yr female patient with hypercholesterolemia.  Patient denies chest pain, shortness of breath and intermittent claudication.  Patient does not complaint about limitations during effort.  Patient has no palpitations.        PAST MEDICAL HISTORY:  Past Medical History:   Diagnosis Date     Edema 10/24/2013     Gastro-oesophageal reflux disease      Heavy periods 10/24/2013     Hyperthyroidism 9/26/2014    borderline-treated with a med for 6 months in [place] and then numbers improved. had a low TSH here and referred to [facility]. numbers there were borderline but normal and no antibodies. rec. 12/14-I123 uptake was increased so Graves dz dx'd. may do methimazole if repeat TFTs show hyperthyroid     Leiomyoma of uterus, unspecified 9/26/2014     Nipple discharge 09/14    right side only. neg mammo and right breast u/s. MRI pending     PONV (postoperative nausea and vomiting)      Post-menopause on HRT (hormone replacement therapy) 6/26/2021     Pure hypercholesterolemia 6/26/2021     Varicose veins of lower extremities with other complications 10/24/2013       CURRENT MEDICATIONS:  Current Outpatient Medications   Medication Sig Dispense Refill     cholecalciferol (VITAMIN D3) 125 mcg (5000 units) capsule        ciclopirox (PENLAC) 8 % external solution Apply 8 Application topically as needed To nails nails       EPIDUO FORTE 0.3-2.5 % gel Apply 0.3 Pump topically as needed       estradiol (VIVELLE-DOT) 0.1 MG/24HR bi-weekly patch Place 1 patch onto the skin twice a week 24 patch 3     METHIMAZOLE PO Take 2.5 mg by mouth daily       olopatadine (PATANOL) 0.1 % ophthalmic solution  INSTILL 1 DROP INTO BOTH EYES TWICE A DAY 5 mL 11     levocetirizine (XYZAL) 5 MG tablet Take 5 mg by mouth every evening       montelukast (SINGULAIR) 10 MG tablet Take 1 tablet (10 mg) by mouth At Bedtime (Patient not taking: Reported on 2020) 30 tablet 3     XIIDRA 5 % opthalmic solution Apply 5 Application topically as needed         PAST SURGICAL HISTORY:  Past Surgical History:   Procedure Laterality Date     APPENDECTOMY        SECTION       CHOLECYSTECTOMY, LAPOROSCOPIC  2012    Cholecystectomy, Laparoscopic     COLONOSCOPY N/A 2017    Procedure: COLONOSCOPY;  COLONOSCOPY;  Surgeon: Shannon Noel MD;  Location:  GI     COLONOSCOPY N/A 2019    Procedure: COLONOSCOPY;  Surgeon: Shannon Noel MD;  Location:  GI     HYSTERECTOMY SUPRACERVICAL N/A 2014    Procedure: HYSTERECTOMY SUPRACERVICAL;  Surgeon: Shauna Arceo MD;  Location:  OR [facility] and [facility] done by Adis/ Leora. started as laparoscopic but converted to open due to adhesions     LAPAROSCOPIC CYSTECTOMY OVARIAN (ONCOLOGY)      right. benign cyst. partial oopherectomy in [facility]     LAPAROSCOPIC SALPINGO-OOPHORECTOMY  14     MYOMECTOMY UTERUS  2008    hysteroscopic     TONSILLECTOMY  1979       ALLERGIES     Allergies   Allergen Reactions     Penicillins      Seasonal Allergies      Amoxicillin Rash       FAMILY HISTORY:  Family History   Problem Relation Age of Onset     Diabetes Mother      Cancer Mother         melanoma     Colon Polyps Mother      Coronary Artery Disease Father      No Known Problems Sister      Diabetes Maternal Grandmother      Breast Cancer Maternal Aunt      Thyroid Disease Cousin      No Known Problems Brother      No Known Problems Maternal Grandfather      No Known Problems Paternal Grandmother      Asthma No family hx of        SOCIAL HISTORY:  Social History     Socioeconomic History     Marital status:      Spouse name: Marianna     Number of  children: 2     Years of education: 16     Highest education level: None   Occupational History     Occupation: Program Leader     Comment: [employer]     Employer: [employer]    Tobacco Use     Smoking status: Former Smoker     Packs/day: 0.00     Years: 0.00     Pack years: 0.00     Types: Cigarettes     Smokeless tobacco: Never Used     Tobacco comment: Quit 31+ years ago.   Substance and Sexual Activity     Alcohol use: Yes     Alcohol/week: 0.0 standard drinks     Comment: Socially      Drug use: No     Sexual activity: Yes     Partners: Male     Birth control/protection: None     Comment: hysterectomy   Other Topics Concern      Service Not Asked     Blood Transfusions No     Caffeine Concern Not Asked     Occupational Exposure Not Asked     Hobby Hazards Not Asked     Sleep Concern Not Asked     Stress Concern Not Asked     Weight Concern Not Asked     Special Diet No     Back Care Not Asked     Exercise No     Bike Helmet Not Asked     Comment: NA     Seat Belt Yes     Self-Exams Not Asked     Parent/sibling w/ CABG, MI or angioplasty before 65F 55M? No   Social History Narrative    The patient was born in [facility]. She eats fruits and vegetables every day. She takes calcium supplement and vitamin D.        No advanced care directives on file         Social Determinants of Health     Financial Resource Strain: Not on file   Food Insecurity: Not on file   Transportation Needs: Not on file   Physical Activity: Not on file   Stress: Not on file   Social Connections: Not on file   Intimate Partner Violence: Not on file   Housing Stability: Not on file       ROS:   Constitutional: No fever, chills, or sweats. No weight gain/loss   ENT: No visual disturbance, ear ache, epistaxis, sore throat  Allergies/Immunologic: Negative.   Respiratory: No cough, hemoptysia  Cardiovascular: As per HPI  GI: No nausea, vomiting, hematemesis, melena, or hematochezia  : No urinary frequency, dysuria, or  hematuria  Integument: Negative  Psychiatric: Negative  Neuro: Negative  Endocrinology: Negative   Musculoskeletal: Negative    EXAM:  Virtual visit     Labs:  LIPID RESULTS:  Lab Results   Component Value Date    CHOL 213 (H) 12/13/2021    CHOL 268 (H) 06/23/2021    HDL 54 12/13/2021    HDL 63 06/23/2021     (H) 12/13/2021     (H) 06/23/2021    TRIG 107 12/13/2021    TRIG 119 06/23/2021    CHOLHDLRATIO 3.5 09/11/2015    NHDL 159 (H) 12/13/2021    NHDL 205 (H) 06/23/2021       LIVER ENZYME RESULTS:  Lab Results   Component Value Date    AST 13 12/13/2021    AST 20 05/20/2021    ALT 24 12/13/2021    ALT 29 05/20/2021       CBC RESULTS:  Lab Results   Component Value Date    WBC 7.2 05/20/2021    RBC 4.35 05/20/2021    HGB 13.6 05/20/2021    HCT 39.1 05/20/2021    MCV 90 05/20/2021    MCH 31.3 05/20/2021    MCHC 34.8 05/20/2021    RDW 12.3 05/20/2021     05/20/2021       BMP RESULTS:  Lab Results   Component Value Date     12/13/2021     05/20/2021    POTASSIUM 4.2 12/13/2021    POTASSIUM 4.3 05/20/2021    CHLORIDE 105 12/13/2021    CHLORIDE 106 05/20/2021    CO2 25 12/13/2021    CO2 24 05/20/2021    ANIONGAP 5 12/13/2021    ANIONGAP 6 05/20/2021    GLC 91 12/13/2021    GLC 72 05/20/2021    BUN 15 12/13/2021    BUN 10 05/20/2021    CR 0.62 12/13/2021    CR 0.60 05/20/2021    GFRESTIMATED >90 12/13/2021    GFRESTIMATED >90 05/20/2021    GFRESTBLACK >90 05/20/2021    BUZZ 8.6 12/13/2021    BUZZ 8.9 05/20/2021        A1C RESULTS:  Lab Results   Component Value Date    A1C 5.2 11/11/2020             Assessment and Plan:     I discussed the results with the patient.  I discussed the importance of a heart healthy diet and lifestyle.  We continue with the same medical regimen.  Follow-up within 1 year.    David Sepulveda MD, PhD  Professor of Medicine  Division of Cardiology    CC  Patient Care Team:  Tim Hill MD as PCP - General (Family Practice)  Tim Hill MD as Assigned PCP  Adis  MD Shauna as Assigned OBGYN Provider  David Sepulveda MD as Assigned Heart and Vascular Provider  Gricel Laws RN as Specialty Care Coordinator (Cardiology)

## 2022-01-03 NOTE — NURSING NOTE
Chief Complaint   Patient presents with     Follow Up     follow for lab results and daughters results. Pt flagged for 3 medication removals from Alise Gold, Visit Facilitator/MA.

## 2022-01-04 NOTE — PATIENT INSTRUCTIONS
"January 3, 2022    Cardiology Provider You Saw During Your Visit: Dr. Sepulveda      Medication Changes: None      Follow Up: As needed      Labs:   Results for BENJI MCALLISTER (MRN [id number]) as of 1/3/2022 18:43   Ref. Range 12/13/2021 10:59   Sodium Latest Ref Range: 133 - 144 mmol/L 135   Potassium Latest Ref Range: 3.4 - 5.3 mmol/L 4.2   Chloride Latest Ref Range: 94 - 109 mmol/L 105   Carbon Dioxide Latest Ref Range: 20 - 32 mmol/L 25   Urea Nitrogen Latest Ref Range: 7 - 30 mg/dL 15   Creatinine Latest Ref Range: 0.52 - 1.04 mg/dL 0.62   GFR Estimate Latest Ref Range: >60 mL/min/1.73m2 >90   Calcium Latest Ref Range: 8.5 - 10.1 mg/dL 8.6   Anion Gap Latest Ref Range: 3 - 14 mmol/L 5   Albumin Latest Ref Range: 3.4 - 5.0 g/dL 3.4   Protein Total Latest Ref Range: 6.8 - 8.8 g/dL 7.2   Bilirubin Total Latest Ref Range: 0.2 - 1.3 mg/dL 0.4   Alkaline Phosphatase Latest Ref Range: 40 - 150 U/L 41   ALT Latest Ref Range: 0 - 50 U/L 24   AST Latest Ref Range: 0 - 45 U/L 13   Cholesterol Latest Ref Range: <200 mg/dL 213 (H)   Patient Fasting > 8hrs? Unknown Yes   HDL Cholesterol Latest Ref Range: >=50 mg/dL 54   LDL Cholesterol Calculated Latest Ref Range: <=100 mg/dL 138 (H)   Non HDL Cholesterol Latest Ref Range: <130 mg/dL 159 (H)   Triglycerides Latest Ref Range: <150 mg/dL 107   Glucose Latest Ref Range: 70 - 99 mg/dL 91           Follow the American Heart Association Diet and Lifestyle Recommendations:  -Limit saturated fat, trans fat, sodium, red meat, sweets and sugar-sweetened beverages. If you choose to eat red meat, compare labels and select the leanest cuts available.  -Aim for at least 150 minutes of moderate physical activity or 75 minutes of vigorous physical activity - or an equal combination of both - each week.      To Reach Us:  -During business hours: [phone], press option # 1 to be routed to the [facility] then option # 4 for \"To send a message to your care team\"     -After hours, weekends or " holidays: [phone], press option #4 and ask to speak to the on-call cardiologist. Inform them you are a patient at the [facility].      Gricel Laws RN  Cardiology Care Coordinator - General Cardiology  [facility]

## 2022-05-27 ENCOUNTER — ANCILLARY PROCEDURE (OUTPATIENT)
Dept: MAMMOGRAPHY | Facility: CLINIC | Age: 52
End: 2022-05-27
Attending: OBSTETRICS & GYNECOLOGY
Payer: COMMERCIAL

## 2022-05-27 DIAGNOSIS — Z12.31 VISIT FOR SCREENING MAMMOGRAM: ICD-10-CM

## 2022-05-27 PROCEDURE — 77063 BREAST TOMOSYNTHESIS BI: CPT | Mod: TC | Performed by: RADIOLOGY

## 2022-05-27 PROCEDURE — 77067 SCR MAMMO BI INCL CAD: CPT | Mod: TC | Performed by: RADIOLOGY

## 2022-07-14 DIAGNOSIS — E78.5 HYPERLIPIDEMIA LDL GOAL <100: Primary | ICD-10-CM

## 2022-07-18 DIAGNOSIS — Z79.890 POST-MENOPAUSE ON HRT (HORMONE REPLACEMENT THERAPY): ICD-10-CM

## 2022-07-18 DIAGNOSIS — N95.1 SYMPTOMS, SUCH AS FLUSHING, SLEEPLESSNESS, HEADACHE, LACK OF CONCENTRATION, ASSOCIATED WITH THE MENOPAUSE: ICD-10-CM

## 2022-07-18 RX ORDER — ESTRADIOL 0.1 MG/D
FILM, EXTENDED RELEASE TRANSDERMAL
Qty: 24 PATCH | Refills: 0 | Status: SHIPPED | OUTPATIENT
Start: 2022-07-18 | End: 2022-08-10

## 2022-07-18 NOTE — TELEPHONE ENCOUNTER
"Requested Prescriptions   Pending Prescriptions Disp Refills     estradiol (VIVELLE-DOT) 0.1 MG/24HR bi-weekly patch [Pharmacy Med Name: ESTRADIOL 0.1 MG PATCH (2/WK)] 24 patch 3     Sig: APPLY 1 PATCH TWICE A WEEK       Hormone Replacement Therapy Failed - 7/18/2022 12:38 AM        Failed - Blood pressure under 140/90 in past 12 months     BP Readings from Last 3 Encounters:   06/23/21 102/64   05/20/21 118/74   03/11/21 110/80                 Passed - Recent (12 mo) or future (30 days) visit within the authorizing provider's specialty     Patient has had an office visit with the authorizing provider or a provider within the authorizing providers department within the previous 12 mos or has a future within next 30 days. See \"Patient Info\" tab in inbasket, or \"Choose Columns\" in Meds & Orders section of the refill encounter.              Passed - Patient has mammogram in past 2 years on file if age 50-75        Passed - Medication is active on med list        Passed - Patient is 18 years of age or older        Passed - No active pregnancy on record        Passed - No positive pregnancy test on record in past 12 months           Last OV: 6/23/21  Medication is being filled for 1 time refill only due to:  Patient needs to be seen because it has been more than one year since last visit. has appt scheduled 8/10/22 with Dr.Jeffers Felicita Montoya, STACY      "

## 2022-08-08 DIAGNOSIS — E05.00 GRAVES DISEASE: Primary | ICD-10-CM

## 2022-08-08 NOTE — PROGRESS NOTES
Shital is a 51 year old  female who presents for annual exam.     Besides routine health maintenance, she has no other health concerns today .    HPI:  The patient's PCP is  Tim Hill MD.      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 Likes school and says that has a diff type of appreciation for school now that she is older.     Was placed on 0.1mg vivelle patch when her v.m sx started. Had a LASH  but ovaries were left. However fairly shortly after her lash at age 43 began to have significant v.m sx and started on it.  Sx are completely resolved on it so decided to try to wean off of it. Did well initially but then after a short time completely off of it her sx came back. Wondering what plan should be to balance sx control and overall risks.     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 Wondering if that is hormone related or something else.    Patient had a high LDL in the 180s and was Rx statin. Can't recall if never took it or just short time and then stopped but managing with her PCP. Despite weight gain and being more sedentary changed her diet and doing meditarannean based and home cooked meals and less going out and her last LDL was in the 130s  hoping to keep working on lifestyle changes rather than needing meds.      Robin has moved out of the house since the last visit, enjoys alone time w/ her . Josh matriculated to medical school this yr. Tried to get in last yr but was instructed to find more clinical experience, did that for a yr, then was admitted to  [employer] is unsure of  which specialty though. Josh is also engaged to her  and patient has been planning her wedding.     GYNECOLOGIC HISTORY:    Patient's last menstrual period was 10/26/2014.      Her current contraception method is: hysterectomy.  She  reports that she has quit smoking. Her smoking use included cigarettes. She smoked 0.00 packs per day for 0.00 years. She has never used smokeless tobacco.      Patient is sexually active.  STD testing offered?  Declined    Last PHQ-9 score on record =   PHQ-9 SCORE 8/10/2022   PHQ-9 Total Score 0     Last GAD7 score on record =   RACHEL-7 SCORE 8/10/2022   Total Score 0     Alcohol Score = 1    HEALTH MAINTENANCE:  Cholesterol:   Recent Labs   Lab Test 21  1059 21  0915 16  1116 09/11/15  1115 14  1201   CHOL 213* 268*   < > 184 192   HDL 54 63   < > 52 52   * 181*   < > 125 125   TRIG 107 119   < > 35 77   CHOLHDLRATIO  --   --   --  3.5 3.7    < > = values in this interval not displayed.       Last Mammo: 22, Result: Normal, Next Mammo: routine   Pap:   Lab Results   Component Value Date    PAP NIL 07/15/2019    PAP NIL 10/23/2012      Colonoscopy:  19, Result: Normal, Next Colonoscopy:    Dexa:  N/A    Health maintenance updated:  yes    HISTORY:  OB History    Para Term  AB Living   6 2 2 0 4 2   SAB IAB Ectopic Multiple Live Births   2 2 0 0 2      # Outcome Date GA Lbr Rajesh/2nd Weight Sex Delivery Anes PTL Lv   6 Term 10/01/98    F CS-LTranv   ARASELI      Name: Robin   5 Term 96    F CS-LTranv   ARASELI      Name: Josh   4 IAB            3 IAB            2 SAB            1 SAB                Patient Active Problem List   Diagnosis     Hyperthyroidism     S/P abdominal supracervical subtotal hysterectomy     Elevated fasting glucose     Pure hypercholesterolemia     Post-menopause on HRT (hormone replacement therapy)     Symptoms, such as flushing, sleeplessness, headache, lack of concentration, associated with the  menopause     Past Surgical History:   Procedure Laterality Date     APPENDECTOMY        SECTION       CHOLECYSTECTOMY, LAPOROSCOPIC  2012    Cholecystectomy, Laparoscopic     COLONOSCOPY N/A 2017    Procedure: COLONOSCOPY;  COLONOSCOPY;  Surgeon: Shannon Noel MD;  Location:  GI     COLONOSCOPY N/A 2019    Procedure: COLONOSCOPY;  Surgeon: Shannon Noel MD;  Location:  GI     HYSTERECTOMY SUPRACERVICAL N/A 2014    Procedure: HYSTERECTOMY SUPRACERVICAL;  Surgeon: Shauna Arceo MD;  [facility] by Adis/ Leora. started as laparoscopic but converted to open due to adhesions     LAPAROSCOPIC CYSTECTOMY OVARIAN (ONCOLOGY)      right. benign cyst. partial oopherectomy in [facility]     LAPAROSCOPIC SALPINGO-OOPHORECTOMY  14     MYOMECTOMY UTERUS      hysteroscopic     TONSILLECTOMY        Social History     Tobacco Use     Smoking status: Former Smoker     Packs/day: 0.00     Years: 0.00     Pack years: 0.00     Types: Cigarettes     Smokeless tobacco: Never Used     Tobacco comment: Quit 31+ years ago.   Substance Use Topics     Alcohol use: Yes     Alcohol/week: 0.0 standard drinks     Comment: Socially       Problem (# of Occurrences) Relation (Name,Age of Onset)    Breast Cancer (1) Maternal Aunt    Cancer (1) Mother (Lyndsey Wolff): melanoma    Colon Polyps (1) Mother (Lyndsey Wolff)    Coronary Artery Disease (1) Father (Arnie Wheatley)    Diabetes (2) Mother (Lyndsey Wolff), Maternal Grandmother (Mallorie Wolff)    No Known Problems (4) Sister, Brother, Maternal Grandfather, Paternal Grandmother    Thyroid Disease (1) Cousin (Vianey Gruber)       Negative family history of: Asthma            Current Outpatient Medications   Medication Sig     estradiol (VIVELLE-DOT) 0.075 MG/24HR BIW patch Place 1 patch onto the skin twice a week     cholecalciferol (VITAMIN D3) 125 mcg (5000 units) capsule      ciclopirox (PENLAC) 8 % external  "solution Apply 8 Application topically as needed To nails nails (Patient not taking: No sig reported)     EPIDUO FORTE 0.3-2.5 % gel Apply 0.3 Pump topically as needed (Patient not taking: No sig reported)     methimazole (TAPAZOLE) 5 MG tablet TAKE 1/2 TABLET BY MOUTH DAILY     olopatadine (PATANOL) 0.1 % ophthalmic solution INSTILL 1 DROP INTO BOTH EYES TWICE A DAY (Patient not taking: No sig reported)     No current facility-administered medications for this visit.     Allergies   Allergen Reactions     Penicillins      Seasonal Allergies      Amoxicillin Rash       Past medical, surgical, social and family histories were reviewed and updated in EPIC.    ROS:   12 point review of systems negative other than symptoms noted below or in the HPI.  No urinary frequency or dysuria, bladder or kidney problems    EXAM:  /68   Ht 1.615 m (5' 3.6\")   Wt 81.6 kg (180 lb)   LMP 10/26/2014   Breastfeeding No   BMI 31.29 kg/m     BMI: Body mass index is 31.29 kg/m .    PHYSICAL EXAM:  Constitutional:   Appearance: Well nourished, well developed, alert, in no acute distress  Neck:  Lymph Nodes:  No lymphadenopathy present    Thyroid:  Gland size normal, nontender, no nodules or masses present  on palpation  Chest:  Respiratory Effort:  Breathing unlabored, CTA bilaterally  Cardiovascular:    Heart: Auscultation:  Regular rate, normal rhythm, no murmurs present  Breasts: Inspection of Breasts:  No lymphadenopathy present., Palpation of Breasts and Axillae:  No masses present on palpation, no breast tenderness., Axillary Lymph Nodes:  No lymphadenopathy present. and No nodularity, asymmetry or nipple discharge bilaterally.  Gastrointestinal:   Abdominal Examination:  Abdomen nontender to palpation, tone normal without rigidity or guarding, no masses present, umbilicus without lesions   Liver and Spleen:  No hepatomegaly present, liver nontender to palpation    Hernias:  No hernias present  Lymphatic: Lymph Nodes:  No " other lymphadenopathy present  Skin:  General Inspection:  No rashes present, no lesions present, no areas of  discoloration  Neurologic:    Mental Status:  Oriented X3.  Normal strength and tone, sensory exam                grossly normal, mentation intact and speech normal.    Psychiatric:   Mentation appears normal and affect normal/bright.         Pelvic Exam:  External Genitalia:     Normal appearance for age, no discharge present, no tenderness present, no inflammatory lesions present, color normal  Vagina:     Normal vaginal vault without central or paravaginal defects, no discharge present, no inflammatory lesions present, no masses present  Bladder:     Nontender to palpation  Urethra:   Urethral Body:  Urethra palpation normal, urethra structural support normal   Urethral Meatus:  No erythema or lesions present  Cervix:     Appearance healthy, no lesions present, nontender to palpation, no bleeding present  Uterus:     Surgically absent  Adnexa:     No adnexal tenderness present, no adnexal masses present  Perineum:     Perineum within normal limits, no evidence of trauma, no rashes or skin lesions present  Anus:     Anus within normal limits, no hemorrhoids present  Inguinal Lymph Nodes:     No lymphadenopathy present  Pubic Hair:     Normal pubic hair distribution for age  Genitalia and Groin:     No rashes present, no lesions present, no areas of discoloration, no masses present      COUNSELING:   Reviewed preventive health counseling, as reflected in patient instructions    BMI: Body mass index is 31.29 kg/m .  Weight management plan: Discussed healthy diet and exercise guidelines    ASSESSMENT:  51 year old female with satisfactory annual exam.    ICD-10-CM    1. Encounter for gynecological examination without abnormal finding  Z01.419    2. Hyperthyroidism  E05.90    3. S/P abdominal supracervical subtotal hysterectomy  Z90.711    4. Post-menopause on HRT (hormone replacement therapy)  Z79.890  estradiol (VIVELLE-DOT) 0.075 MG/24HR BIW patch   5. Symptoms, such as flushing, sleeplessness, headache, lack of concentration, associated with the menopause  N95.1 estradiol (VIVELLE-DOT) 0.075 MG/24HR BIW patch   6. Screening for osteoporosis  Z13.820 DX Hip/Pelvis/Spine   7. Need for Tdap vaccination  Z23 TDAP VACCINE (Adacel, Boostrix)  [[id number]]   8. Need for prophylactic vaccination with combined diphtheria-tetanus-pertussis (DTP) vaccine  Z23        PLAN:  Dexa done today.   TDAP done today.   mammo was done 5/22 and normal. May consider trying to shift appts so can sync up and be done at the same time going forward    Patient attempted to wean and stop her ERT but her sx did return. Discussed her young age and younger but average age for v.m sx at 43.  Discussed shortest course and lowest dose possible for HRT while balancing sx and overall risk.  With just ERT and in a patch and at her young age do feel that some degree of ERT is likely needed as evidenced by her sx  Will try 0.075mg patch and do that long term or again try to wean lower over time.    Never started lipid meds although was considering it and not opposed. Went to see cards who told her to do fish oil and work on dietary changes and her f/u was much improved.  Should do annual fasting lipids either with cards or PCP to determine when/if statin is necessary in the future.     Has not had dexa yet and reviewed the recommendations and age, etc.  Hard to know exactly when menopause is/was due to hyst and v.m sx alone could be perimenopause.  However do feel that she should consider it in the next year or two.     On the same date of the encounter, an additional 10 minutes on top of the preventative gyn annual exam, were spent on chart review and completion including: imaging, lab work, previous visit notes by this provider,  and other provider notes, along with direct management of the patient's medical issues as above.    Shauna Arceo MD

## 2022-08-10 ENCOUNTER — OFFICE VISIT (OUTPATIENT)
Dept: OBGYN | Facility: CLINIC | Age: 52
End: 2022-08-10
Attending: OBSTETRICS & GYNECOLOGY

## 2022-08-10 ENCOUNTER — ANCILLARY PROCEDURE (OUTPATIENT)
Dept: BONE DENSITY | Facility: CLINIC | Age: 52
End: 2022-08-10
Attending: OBSTETRICS & GYNECOLOGY
Payer: COMMERCIAL

## 2022-08-10 VITALS
DIASTOLIC BLOOD PRESSURE: 68 MMHG | WEIGHT: 180 LBS | HEIGHT: 64 IN | BODY MASS INDEX: 30.73 KG/M2 | SYSTOLIC BLOOD PRESSURE: 114 MMHG

## 2022-08-10 DIAGNOSIS — Z23 NEED FOR PROPHYLACTIC VACCINATION WITH COMBINED DIPHTHERIA-TETANUS-PERTUSSIS (DTP) VACCINE: ICD-10-CM

## 2022-08-10 DIAGNOSIS — Z90.711 S/P ABDOMINAL SUPRACERVICAL SUBTOTAL HYSTERECTOMY: ICD-10-CM

## 2022-08-10 DIAGNOSIS — Z13.820 SCREENING FOR OSTEOPOROSIS: ICD-10-CM

## 2022-08-10 DIAGNOSIS — N95.1 SYMPTOMS, SUCH AS FLUSHING, SLEEPLESSNESS, HEADACHE, LACK OF CONCENTRATION, ASSOCIATED WITH THE MENOPAUSE: ICD-10-CM

## 2022-08-10 DIAGNOSIS — Z23 NEED FOR TDAP VACCINATION: ICD-10-CM

## 2022-08-10 DIAGNOSIS — Z79.890 POST-MENOPAUSE ON HRT (HORMONE REPLACEMENT THERAPY): ICD-10-CM

## 2022-08-10 DIAGNOSIS — E05.90 HYPERTHYROIDISM: ICD-10-CM

## 2022-08-10 DIAGNOSIS — Z01.419 ENCOUNTER FOR GYNECOLOGICAL EXAMINATION WITHOUT ABNORMAL FINDING: Primary | ICD-10-CM

## 2022-08-10 PROCEDURE — 77080 DXA BONE DENSITY AXIAL: CPT | Performed by: OBSTETRICS & GYNECOLOGY

## 2022-08-10 PROCEDURE — 99396 PREV VISIT EST AGE 40-64: CPT | Mod: 25 | Performed by: OBSTETRICS & GYNECOLOGY

## 2022-08-10 PROCEDURE — 90715 TDAP VACCINE 7 YRS/> IM: CPT | Performed by: OBSTETRICS & GYNECOLOGY

## 2022-08-10 PROCEDURE — 90471 IMMUNIZATION ADMIN: CPT | Performed by: OBSTETRICS & GYNECOLOGY

## 2022-08-10 PROCEDURE — 99213 OFFICE O/P EST LOW 20 MIN: CPT | Mod: 25 | Performed by: OBSTETRICS & GYNECOLOGY

## 2022-08-10 RX ORDER — ESTRADIOL 0.07 MG/D
1 FILM, EXTENDED RELEASE TRANSDERMAL
Qty: 24 PATCH | Refills: 3 | Status: SHIPPED | OUTPATIENT
Start: 2022-08-11 | End: 2023-07-24

## 2022-08-10 ASSESSMENT — PATIENT HEALTH QUESTIONNAIRE - PHQ9
SUM OF ALL RESPONSES TO PHQ QUESTIONS 1-9: 0
5. POOR APPETITE OR OVEREATING: NOT AT ALL

## 2022-08-10 ASSESSMENT — ANXIETY QUESTIONNAIRES
5. BEING SO RESTLESS THAT IT IS HARD TO SIT STILL: NOT AT ALL
GAD7 TOTAL SCORE: 0
GAD7 TOTAL SCORE: 0
1. FEELING NERVOUS, ANXIOUS, OR ON EDGE: NOT AT ALL
IF YOU CHECKED OFF ANY PROBLEMS ON THIS QUESTIONNAIRE, HOW DIFFICULT HAVE THESE PROBLEMS MADE IT FOR YOU TO DO YOUR WORK, TAKE CARE OF THINGS AT HOME, OR GET ALONG WITH OTHER PEOPLE: NOT DIFFICULT AT ALL
2. NOT BEING ABLE TO STOP OR CONTROL WORRYING: NOT AT ALL
3. WORRYING TOO MUCH ABOUT DIFFERENT THINGS: NOT AT ALL
6. BECOMING EASILY ANNOYED OR IRRITABLE: NOT AT ALL
7. FEELING AFRAID AS IF SOMETHING AWFUL MIGHT HAPPEN: NOT AT ALL

## 2022-08-11 ENCOUNTER — OFFICE VISIT (OUTPATIENT)
Dept: FAMILY MEDICINE | Facility: CLINIC | Age: 52
End: 2022-08-11
Payer: COMMERCIAL

## 2022-08-11 VITALS
BODY MASS INDEX: 30.56 KG/M2 | OXYGEN SATURATION: 97 % | RESPIRATION RATE: 16 BRPM | SYSTOLIC BLOOD PRESSURE: 112 MMHG | WEIGHT: 179 LBS | DIASTOLIC BLOOD PRESSURE: 78 MMHG | HEART RATE: 83 BPM | TEMPERATURE: 97.7 F | HEIGHT: 64 IN

## 2022-08-11 DIAGNOSIS — E78.5 HYPERLIPIDEMIA LDL GOAL <100: ICD-10-CM

## 2022-08-11 DIAGNOSIS — E05.00 GRAVES DISEASE: ICD-10-CM

## 2022-08-11 DIAGNOSIS — Z01.818 PREOPERATIVE EXAMINATION: Primary | ICD-10-CM

## 2022-08-11 DIAGNOSIS — H57.9 EYE LESION: ICD-10-CM

## 2022-08-11 LAB — T3FREE SERPL-MCNC: 3.4 PG/ML (ref 2–4.4)

## 2022-08-11 PROCEDURE — 84443 ASSAY THYROID STIM HORMONE: CPT | Performed by: FAMILY MEDICINE

## 2022-08-11 PROCEDURE — 80053 COMPREHEN METABOLIC PANEL: CPT | Performed by: FAMILY MEDICINE

## 2022-08-11 PROCEDURE — 84439 ASSAY OF FREE THYROXINE: CPT | Performed by: FAMILY MEDICINE

## 2022-08-11 PROCEDURE — 83721 ASSAY OF BLOOD LIPOPROTEIN: CPT | Performed by: FAMILY MEDICINE

## 2022-08-11 PROCEDURE — 84481 FREE ASSAY (FT-3): CPT | Performed by: FAMILY MEDICINE

## 2022-08-11 PROCEDURE — 36415 COLL VENOUS BLD VENIPUNCTURE: CPT | Performed by: FAMILY MEDICINE

## 2022-08-11 PROCEDURE — 99214 OFFICE O/P EST MOD 30 MIN: CPT | Performed by: FAMILY MEDICINE

## 2022-08-11 PROCEDURE — 80061 LIPID PANEL: CPT | Performed by: FAMILY MEDICINE

## 2022-08-11 ASSESSMENT — PAIN SCALES - GENERAL: PAINLEVEL: NO PAIN (0)

## 2022-08-11 NOTE — PROGRESS NOTES
[address] [address]  Phone: [phone]  Primary Provider: Tim Hill        PREOPERATIVE EVALUATION:  Today's date: 8/11/2022    Shital Parrish is a 51 year old female who presents for a preoperative evaluation.    Surgical Information:  Surgery/Procedure: eye surgery both eyes   Surgery Location: [facility]   Surgeon: Dr Ruiz   Surgery Date: 8/12/2022  Time of Surgery:  11:30   Where patient plans to recover: At home with family  Fax number for surgical facility: [facility] [phone]     Type of Anesthesia Anticipated: to be determined    Assessment & Plan     The proposed surgical procedure is considered LOW risk.    Preoperative examination      Eye lesion             Risks and Recommendations:  The patient has the following additional risks and recommendations for perioperative complications:   - No identified additional risk factors other than previously addressed    Medication Instructions:   - naproxen (Aleve, Naprosyn): HOLD 4 days before surgery.     RECOMMENDATION:  APPROVAL GIVEN to proceed with proposed procedure, without further diagnostic evaluation.    Review of external notes as documented above           Subjective       Preop Questions 8/4/2022   1. Have you ever had a heart attack or stroke? No   2. Have you ever had surgery on your heart or blood vessels, such as a stent placement, a coronary artery bypass, or surgery on an artery in your head, neck, heart, or legs? No   3. Do you have chest pain with activity? No   4. Do you have a history of  heart failure? No   5. Do you currently have a cold, bronchitis or symptoms of other infection? No   6. Do you have a cough, shortness of breath, or wheezing? No   7. Do you or anyone in your family have previous history of blood clots? UNKNOWN    8. Do you or does anyone in your family have a serious bleeding problem such as prolonged  bleeding following surgeries or cuts? No   9. Have you ever had problems with anemia or been told to take iron pills? No   10. Have you had any abnormal blood loss such as black, tarry or bloody stools, or abnormal vaginal bleeding? No   11. Have you ever had a blood transfusion? No   12. Are you willing to have a blood transfusion if it is medically needed before, during, or after your surgery? Yes   13. Have you or any of your relatives ever had problems with anesthesia? No   14. Do you have sleep apnea, excessive snoring or daytime drowsiness? No   15. Do you have any artifical heart valves or other implanted medical devices like a pacemaker, defibrillator, or continuous glucose monitor? No   16. Do you have artificial joints? No   17. Are you allergic to latex? No   18. Is there any chance that you may be pregnant? No       Health Care Directive:  Patient does not have a Health Care Directive or Living Will: Discussed advance care planning with patient; however, patient declined at this time.    Preoperative Review of :   reviewed - no record of controlled substances prescribed.      Status of Chronic Conditions:  See problem list for active medical problems.  Problems all longstanding and stable, except as noted/documented.  See ROS for pertinent symptoms related to these conditions.      Review of Systems  CONSTITUTIONAL: NEGATIVE for fever, chills, change in weight  ENT/MOUTH: NEGATIVE for ear, mouth and throat problems  RESP: NEGATIVE for significant cough or SOB  CV: NEGATIVE for chest pain, palpitations or peripheral edema    Patient Active Problem List    Diagnosis Date Noted     Pure hypercholesterolemia 06/26/2021     Priority: Medium     Post-menopause on HRT (hormone replacement therapy) 06/26/2021     Priority: Medium     Symptoms, such as flushing, sleeplessness, headache, lack of concentration, associated with the menopause 06/26/2021     Priority: Medium     Elevated fasting glucose  2015     Priority: Medium     S/P abdominal supracervical subtotal hysterectomy 2014     Priority: Medium     Hyperthyroidism 2014     Priority: Medium      Past Medical History:   Diagnosis Date     Edema 10/24/2013     Gastro-oesophageal reflux disease      Heavy periods 10/24/2013     Hyperthyroidism 2014    borderline-treated with a med for 6 months in [facility] and then numbers improved. had a low TSH here and referred to [facility]. numbers there were borderline but normal and no antibodies. rec. -I123 uptake was increased so Graves dz dx'd. may do methimazole if repeat TFTs show hyperthyroid     Leiomyoma of uterus, unspecified 2014     Nipple discharge     right side only. neg mammo and right breast u/s. MRI pending     PONV (postoperative nausea and vomiting)      Post-menopause on HRT (hormone replacement therapy) 2021     Pure hypercholesterolemia 2021     Varicose veins of lower extremities with other complications 10/24/2013     Past Surgical History:   Procedure Laterality Date     APPENDECTOMY        SECTION       CHOLECYSTECTOMY, LAPOROSCOPIC  2012    Cholecystectomy, Laparoscopic     COLONOSCOPY N/A 2017    Procedure: COLONOSCOPY;  COLONOSCOPY;  Surgeon: Shannon Noel MD;  Location:  GI     COLONOSCOPY N/A 2019    Procedure: COLONOSCOPY;  Surgeon: Shannon Noel MD;  Location:  GI     HYSTERECTOMY SUPRACERVICAL N/A 2014    Procedure: HYSTERECTOMY SUPRACERVICAL;  Surgeon: Shauna Arceo MD;  Location:  OR [facility] and [facility]SO done by [facility]/ [facility]. started as laparoscopic but converted to open due to adhesions     LAPAROSCOPIC CYSTECTOMY OVARIAN (ONCOLOGY)      right. benign cyst. partial oopherectomy in [facility]     LAPAROSCOPIC SALPINGO-OOPHORECTOMY  14     MYOMECTOMY UTERUS  2008    hysteroscopic     TONSILLECTOMY  1979     Current Outpatient Medications   Medication Sig Dispense Refill     cholecalciferol  "(VITAMIN D3) 125 mcg (5000 units) capsule  (Patient not taking: Reported on 8/10/2022)       ciclopirox (PENLAC) 8 % external solution Apply 8 Application topically as needed To nails nails (Patient not taking: Reported on 8/10/2022)       EPIDUO FORTE 0.3-2.5 % gel Apply 0.3 Pump topically as needed (Patient not taking: Reported on 8/10/2022)       estradiol (VIVELLE-DOT) 0.075 MG/24HR BIW patch Place 1 patch onto the skin twice a week 24 patch 3     olopatadine (PATANOL) 0.1 % ophthalmic solution INSTILL 1 DROP INTO BOTH EYES TWICE A DAY (Patient not taking: Reported on 8/10/2022) 5 mL 11       Allergies   Allergen Reactions     Penicillins      Seasonal Allergies      Amoxicillin Rash        Social History     Tobacco Use     Smoking status: Former Smoker     Packs/day: 0.00     Years: 0.00     Pack years: 0.00     Types: Cigarettes     Smokeless tobacco: Never Used     Tobacco comment: Quit 31+ years ago.   Substance Use Topics     Alcohol use: Yes     Alcohol/week: 0.0 standard drinks     Comment: Socially      Family History   Problem Relation Age of Onset     Diabetes Mother      Cancer Mother         melanoma     Colon Polyps Mother      Coronary Artery Disease Father      No Known Problems Sister      Diabetes Maternal Grandmother      Breast Cancer Maternal Aunt      Thyroid Disease Cousin      No Known Problems Brother      No Known Problems Maternal Grandfather      No Known Problems Paternal Grandmother      Asthma No family hx of      History   Drug Use No         Objective     /78   Pulse 83   Temp 97.7  F (36.5  C) (Tympanic)   Resp 16   Ht 1.613 m (5' 3.5\")   Wt 81.2 kg (179 lb)   LMP 10/26/2014   SpO2 97%   BMI 31.21 kg/m      Physical Exam  GENERAL APPEARANCE: healthy, alert and no distress  HENT: ear canals and TM's normal and nose and mouth without ulcers or lesions  RESP: lungs clear to auscultation - no rales, rhonchi or wheezes  CV: regular rate and rhythm, normal S1 S2, no S3 " or S4 and no murmur, click or rub   ABDOMEN: soft, nontender, no HSM or masses and bowel sounds normal  NEURO: Normal strength and tone, sensory exam grossly normal, mentation intact and speech normal    Recent Labs   Lab Test 12/13/21  1059 05/20/21  1615 11/11/20  0000 09/25/20  0805   HGB  --  13.6  --  14.0   PLT  --  306  --  311    136  --  135   POTASSIUM 4.2 4.3  --  4.3   CR 0.62 0.60  --  0.67   A1C  --   --  5.2  --         Diagnostics:  No labs were ordered during this visit.   No EKG required for low risk surgery (cataract, skin procedure, breast biopsy, etc).    Revised Cardiac Risk Index (RCRI):  The patient has the following serious cardiovascular risks for perioperative complications:   - No serious cardiac risks = 0 points     RCRI Interpretation: 0 points: Class I (very low risk - 0.4% complication rate)           Signed Electronically by: Tim Hill MD  Copy of this evaluation report is provided to requesting physician.

## 2022-08-11 NOTE — RESULT ENCOUNTER NOTE
Shital,    Your bone density is great. Your lumbar spine is better than the average 30 year old and your hips are totally normal range too.    Probably don't need to repeat this again before age 59 or so.    So nice to see you today!    Shauna Arceo MD

## 2022-08-12 ENCOUNTER — MEDICAL CORRESPONDENCE (OUTPATIENT)
Dept: HEALTH INFORMATION MANAGEMENT | Facility: CLINIC | Age: 52
End: 2022-08-12

## 2022-08-12 LAB
ALBUMIN SERPL-MCNC: 3.9 G/DL (ref 3.4–5)
ALP SERPL-CCNC: 41 U/L (ref 40–150)
ALT SERPL W P-5'-P-CCNC: 36 U/L (ref 0–50)
ANION GAP SERPL CALCULATED.3IONS-SCNC: 7 MMOL/L (ref 3–14)
AST SERPL W P-5'-P-CCNC: 21 U/L (ref 0–45)
BILIRUB SERPL-MCNC: 0.6 MG/DL (ref 0.2–1.3)
BUN SERPL-MCNC: 16 MG/DL (ref 7–30)
CALCIUM SERPL-MCNC: 9.1 MG/DL (ref 8.5–10.1)
CHLORIDE BLD-SCNC: 106 MMOL/L (ref 94–109)
CHOLEST SERPL-MCNC: 232 MG/DL
CO2 SERPL-SCNC: 24 MMOL/L (ref 20–32)
CREAT SERPL-MCNC: 0.58 MG/DL (ref 0.52–1.04)
FASTING STATUS PATIENT QL REPORTED: YES
GFR SERPL CREATININE-BSD FRML MDRD: >90 ML/MIN/1.73M2
GLUCOSE BLD-MCNC: 85 MG/DL (ref 70–99)
HDLC SERPL-MCNC: 60 MG/DL
LDLC SERPL CALC-MCNC: 159 MG/DL
NONHDLC SERPL-MCNC: 172 MG/DL
POTASSIUM BLD-SCNC: 4.3 MMOL/L (ref 3.4–5.3)
PROT SERPL-MCNC: 7.1 G/DL (ref 6.8–8.8)
SODIUM SERPL-SCNC: 137 MMOL/L (ref 133–144)
T4 FREE SERPL-MCNC: 1.38 NG/DL (ref 0.76–1.46)
TRIGL SERPL-MCNC: 67 MG/DL
TSH SERPL DL<=0.005 MIU/L-ACNC: 0.14 MU/L (ref 0.4–4)

## 2022-08-12 PROCEDURE — 88305 TISSUE EXAM BY PATHOLOGIST: CPT | Mod: TC,ORL | Performed by: SOCIAL WORKER

## 2022-08-13 LAB — LDLC SERPL DIRECT ASSAY-MCNC: 165 MG/DL

## 2022-08-15 ENCOUNTER — LAB REQUISITION (OUTPATIENT)
Dept: LAB | Facility: CLINIC | Age: 52
End: 2022-08-15
Payer: COMMERCIAL

## 2022-08-17 LAB
PATH REPORT.COMMENTS IMP SPEC: NORMAL
PATH REPORT.FINAL DX SPEC: NORMAL
PATH REPORT.GROSS SPEC: NORMAL
PATH REPORT.MICROSCOPIC SPEC OTHER STN: NORMAL
PATH REPORT.RELEVANT HX SPEC: NORMAL
PHOTO IMAGE: NORMAL

## 2022-08-17 PROCEDURE — 88341 IMHCHEM/IMCYTCHM EA ADD ANTB: CPT | Mod: 26 | Performed by: PATHOLOGY

## 2022-08-17 PROCEDURE — 88305 TISSUE EXAM BY PATHOLOGIST: CPT | Mod: 26 | Performed by: PATHOLOGY

## 2022-08-17 PROCEDURE — 88342 IMHCHEM/IMCYTCHM 1ST ANTB: CPT | Mod: 26 | Performed by: PATHOLOGY

## 2022-08-29 ENCOUNTER — TELEPHONE (OUTPATIENT)
Dept: CARDIOLOGY | Facility: CLINIC | Age: 52
End: 2022-08-29

## 2022-08-29 ENCOUNTER — VIRTUAL VISIT (OUTPATIENT)
Dept: CARDIOLOGY | Facility: CLINIC | Age: 52
End: 2022-08-29
Attending: INTERNAL MEDICINE
Payer: COMMERCIAL

## 2022-08-29 DIAGNOSIS — E78.5 HYPERLIPIDEMIA LDL GOAL <100: Primary | ICD-10-CM

## 2022-08-29 PROCEDURE — 99214 OFFICE O/P EST MOD 30 MIN: CPT | Mod: GT | Performed by: INTERNAL MEDICINE

## 2022-08-29 RX ORDER — METHIMAZOLE 5 MG/1
TABLET ORAL
COMMUNITY
Start: 2022-08-12

## 2022-08-29 NOTE — NURSING NOTE
Chief Complaint   Patient presents with     Follow Up     Pt states fatigue the last few days     Tari Chau, VF/CMA

## 2022-08-29 NOTE — PATIENT INSTRUCTIONS
August 29, 2022    Cardiology Provider You Saw During Your Visit: Dr. Sepulveda      Medication Changes: None      Follow Up: Repeat fasting labs in 3 months        Follow the American Heart Association Diet and Lifestyle Recommendations:  -Limit saturated fat, trans fat, sodium, red meat, sweets and sugar-sweetened beverages. If you choose to eat red meat, compare labels and select the leanest cuts available.  -Aim for at least 150 minutes of moderate physical activity or 75 minutes of vigorous physical activity - or an equal combination of both - each week.      To Reach Us:  -During business hours: [phone], press option # 1 to schedule an appointment or to leave a message for your care team.     -After hours, weekends or holidays: [phone], press option #4 and ask to speak to the on-call cardiologist. Inform them you are a patient at the [facility].      Gricel Laws RN  Cardiology Care Coordinator - General Cardiology  [facility]

## 2022-08-29 NOTE — PROGRESS NOTES
Patient is eligible for a video visit      Location:   - Patient: home  - Dr Sepulveda: [facility]    Time:   - 12.30 pm  - 1.00 pm    Video system: TwitChat    HPI: had the privilege to evaluate and examine Ms Shital Parrish, who is 51 yr female patient with heterozygeous hypercholesterolemia.  Patient denies chest pain, shortness of breath and intermittent claudication.  Patient does not complaint about limitations during effort.  Patient has no palpitations.    PAST MEDICAL HISTORY:  Past Medical History:   Diagnosis Date     Edema 10/24/2013     Gastro-oesophageal reflux disease      Heavy periods 10/24/2013     Hyperthyroidism 9/26/2014    borderline-treated with a med for 6 months in [place] and then numbers improved. had a low TSH here and referred to [facility]. numbers there were borderline but normal and no antibodies. rec. 12/14-I123 uptake was increased so Graves dz dx'd. may do methimazole if repeat TFTs show hyperthyroid     Leiomyoma of uterus, unspecified 9/26/2014     Nipple discharge 09/14    right side only. neg mammo and right breast u/s. MRI pending     PONV (postoperative nausea and vomiting)      Post-menopause on HRT (hormone replacement therapy) 6/26/2021     Pure hypercholesterolemia 6/26/2021     Varicose veins of lower extremities with other complications 10/24/2013       CURRENT MEDICATIONS:  Current Outpatient Medications   Medication Sig Dispense Refill     cholecalciferol (VITAMIN D3) 125 mcg (5000 units) capsule        estradiol (VIVELLE-DOT) 0.075 MG/24HR BIW patch Place 1 patch onto the skin twice a week 24 patch 3     methimazole (TAPAZOLE) 5 MG tablet TAKE 1/2 TABLET BY MOUTH DAILY       ciclopirox (PENLAC) 8 % external solution Apply 8 Application topically as needed To nails nails (Patient not taking: No sig reported)       EPIDUO FORTE 0.3-2.5 % gel Apply 0.3 Pump topically as needed (Patient not taking: No sig reported)       olopatadine (PATANOL) 0.1 % ophthalmic solution INSTILL 1 DROP INTO  BOTH EYES TWICE A DAY (Patient not taking: No sig reported) 5 mL 11           PAST SURGICAL HISTORY:  Past Surgical History:   Procedure Laterality Date     APPENDECTOMY        SECTION       CHOLECYSTECTOMY, LAPOROSCOPIC  2012    Cholecystectomy, Laparoscopic     COLONOSCOPY N/A 2017    Procedure: COLONOSCOPY;  COLONOSCOPY;  Surgeon: Shannon Noel MD;  Location:  GI     COLONOSCOPY N/A 2019    Procedure: COLONOSCOPY;  Surgeon: Shannon Noel MD;  Location:  GI     HYSTERECTOMY SUPRACERVICAL N/A 2014    Procedure: HYSTERECTOMY SUPRACERVICAL;  Surgeon: Shauna Arceo MD;  Location:  OR [facility] and LSO done by [facility]. started as laparoscopic but converted to open due to adhesions     LAPAROSCOPIC CYSTECTOMY OVARIAN (ONCOLOGY)      right. benign cyst. partial oopherectomy in [facility]     LAPAROSCOPIC SALPINGO-OOPHORECTOMY  14     MYOMECTOMY UTERUS      hysteroscopic     TONSILLECTOMY  1979       ALLERGIES     Allergies   Allergen Reactions     Penicillins      Seasonal Allergies      Amoxicillin Rash       FAMILY HISTORY:  Family History   Problem Relation Age of Onset     Diabetes Mother      Cancer Mother         melanoma     Colon Polyps Mother      Coronary Artery Disease Father      No Known Problems Sister      Diabetes Maternal Grandmother      Breast Cancer Maternal Aunt      Thyroid Disease Cousin      No Known Problems Brother      No Known Problems Maternal Grandfather      No Known Problems Paternal Grandmother      Asthma No family hx of        SOCIAL HISTORY:  Social History     Socioeconomic History     Marital status:      Spouse name: Marianna     Number of children: 2     Years of education: 16     Highest education level: None   Occupational History     Occupation: Program Leader     Comment: [employer]     Employer: [employer]    Tobacco Use     Smoking status: Former Smoker     Packs/day: 0.00     Years: 0.00     Pack  years: 0.00     Types: Cigarettes     Smokeless tobacco: Never Used     Tobacco comment: Quit 31+ years ago.   Substance and Sexual Activity     Alcohol use: Yes     Alcohol/week: 0.0 standard drinks     Comment: Socially      Drug use: No     Sexual activity: Yes     Partners: Male     Birth control/protection: None     Comment: hysterectomy   Other Topics Concern     Blood Transfusions No     Special Diet No     Exercise No     Seat Belt Yes     Parent/sibling w/ CABG, MI or angioplasty before 65F 55M? No   Social History Narrative    The patient was born in [facility]. She eats fruits and vegetables every day. She takes calcium supplement and vitamin D.        No advanced care directives on file           ROS:   Constitutional: No fever, chills, or sweats. No weight gain/loss   ENT: No visual disturbance, ear ache, epistaxis, sore throat  Allergies/Immunologic: Negative.   Respiratory: No cough, hemoptysia  Cardiovascular: As per HPI  GI: No nausea, vomiting, hematemesis, melena, or hematochezia  : No urinary frequency, dysuria, or hematuria  Integument: Negative  Psychiatric: Negative  Neuro: Negative  Endocrinology: Negative   Musculoskeletal: Negative    EXAM:  LMP 10/26/2014       Labs:  LIPID RESULTS:  Lab Results   Component Value Date    CHOL 232 (H) 08/11/2022    CHOL 268 (H) 06/23/2021    HDL 60 08/11/2022    HDL 63 06/23/2021     (H) 08/11/2022     (H) 08/11/2022     (H) 06/23/2021    TRIG 67 08/11/2022    TRIG 119 06/23/2021    CHOLHDLRATIO 3.5 09/11/2015    NHDL 172 (H) 08/11/2022    NHDL 205 (H) 06/23/2021       LIVER ENZYME RESULTS:  Lab Results   Component Value Date    AST 21 08/11/2022    AST 20 05/20/2021    ALT 36 08/11/2022    ALT 29 05/20/2021       CBC RESULTS:  Lab Results   Component Value Date    WBC 7.2 05/20/2021    RBC 4.35 05/20/2021    HGB 13.6 05/20/2021    HCT 39.1 05/20/2021    MCV 90 05/20/2021    MCH 31.3 05/20/2021    MCHC 34.8 05/20/2021    RDW 12.3  05/20/2021     05/20/2021       BMP RESULTS:  Lab Results   Component Value Date     08/11/2022     05/20/2021    POTASSIUM 4.3 08/11/2022    POTASSIUM 4.3 05/20/2021    CHLORIDE 106 08/11/2022    CHLORIDE 106 05/20/2021    CO2 24 08/11/2022    CO2 24 05/20/2021    ANIONGAP 7 08/11/2022    ANIONGAP 6 05/20/2021    GLC 85 08/11/2022    GLC 72 05/20/2021    BUN 16 08/11/2022    BUN 10 05/20/2021    CR 0.58 08/11/2022    CR 0.60 05/20/2021    GFRESTIMATED >90 08/11/2022    GFRESTIMATED >90 05/20/2021    GFRESTBLACK >90 05/20/2021    BUZZ 9.1 08/11/2022    BUZZ 8.9 05/20/2021        A1C RESULTS:  Lab Results   Component Value Date    A1C 5.2 11/11/2020           Assessment and Plan:     I discussed the results with patient.  I discussed the importance of a heart healthy diet and lifestyle.  I discussed following points with patient.     Medication Changes: None     Follow Up: Repeat fasting labs in 3 months    David Sepulveda MD, PhD  Professor of Medicine  Division of Cardiology       CC  Patient Care Team:  Tim Hill MD as PCP - General (Family Practice)  Tim Hill MD as Assigned PCP  Shauna Arceo MD as Assigned OBGYN Provider  David Sepulveda MD as Assigned Heart and Vascular Provider  Gricel Laws RN as Specialty Care Coordinator (Cardiology)  Shital is a 51 year old who is being evaluated via a billable telephone visit.      What phone number would you like to be contacted at? [phone]  How would you like to obtain your AVS? BETH Molina/TRINITY

## 2022-08-29 NOTE — LETTER
8/29/2022      RE: Shital Parrihs  [address]  [place] [address]       Dear Colleague,    Thank you for the opportunity to participate in the care of your patient, Shital Parrish, at the [facility]ART [facility] [place] at [facility]. Please see a copy of my visit note below.      Patient is eligible for a video visit      Location:   - Patient: home  - Dr Sepulveda: [facility] [place]    Time:   - 12.30 pm  - 1.00 pm    Video system: Vast    HPI: had the privilege to evaluate and examine Ms Shital Parrish, who is 51 yr female patient with heterozygeous hypercholesterolemia.  Patient denies chest pain, shortness of breath and intermittent claudication.  Patient does not complaint about limitations during effort.  Patient has no palpitations.    PAST MEDICAL HISTORY:  Past Medical History:   Diagnosis Date     Edema 10/24/2013     Gastro-oesophageal reflux disease      Heavy periods 10/24/2013     Hyperthyroidism 9/26/2014    borderline-treated with a med for 6 months in [place] and then numbers improved. had a low TSH here and referred to [facility]. numbers there were borderline but normal and no antibodies. rec. 12/14-I123 uptake was increased so Graves dz dx'd. may do methimazole if repeat TFTs show hyperthyroid     Leiomyoma of uterus, unspecified 9/26/2014     Nipple discharge 09/14    right side only. neg mammo and right breast u/s. MRI pending     PONV (postoperative nausea and vomiting)      Post-menopause on HRT (hormone replacement therapy) 6/26/2021     Pure hypercholesterolemia 6/26/2021     Varicose veins of lower extremities with other complications 10/24/2013       CURRENT MEDICATIONS:  Current Outpatient Medications   Medication Sig Dispense Refill     cholecalciferol (VITAMIN D3) 125 mcg (5000 units) capsule        estradiol (VIVELLE-DOT) 0.075 MG/24HR BIW patch Place 1 patch onto the skin twice a week 24 patch 3     methimazole (TAPAZOLE) 5 MG tablet TAKE 1/2  TABLET BY MOUTH DAILY       ciclopirox (PENLAC) 8 % external solution Apply 8 Application topically as needed To nails nails (Patient not taking: No sig reported)       EPIDUO FORTE 0.3-2.5 % gel Apply 0.3 Pump topically as needed (Patient not taking: No sig reported)       olopatadine (PATANOL) 0.1 % ophthalmic solution INSTILL 1 DROP INTO BOTH EYES TWICE A DAY (Patient not taking: No sig reported) 5 mL 11           PAST SURGICAL HISTORY:  Past Surgical History:   Procedure Laterality Date     APPENDECTOMY        SECTION       CHOLECYSTECTOMY, LAPOROSCOPIC  2012    Cholecystectomy, Laparoscopic     COLONOSCOPY N/A 2017    Procedure: COLONOSCOPY;  COLONOSCOPY;  Surgeon: Shannon Noel MD;  Location:  GI     COLONOSCOPY N/A 2019    Procedure: COLONOSCOPY;  Surgeon: Shannon Noel MD;  Location:  GI     HYSTERECTOMY SUPRACERVICAL N/A 2014    Procedure: HYSTERECTOMY SUPRACERVICAL;  Surgeon: Shauna Arceo MD;  Location:  OR [facility] and [facility] done by Ad[facility]/ [facility]. started as laparoscopic but converted to open due to adhesions     LAPAROSCOPIC CYSTECTOMY OVARIAN (ONCOLOGY)      right. benign cyst. partial oopherectomy in [facility]     LAPAROSCOPIC SALPINGO-OOPHORECTOMY  14     MYOMECTOMY UTERUS  2008    hysteroscopic     TONSILLECTOMY  1979       ALLERGIES     Allergies   Allergen Reactions     Penicillins      Seasonal Allergies      Amoxicillin Rash       FAMILY HISTORY:  Family History   Problem Relation Age of Onset     Diabetes Mother      Cancer Mother         melanoma     Colon Polyps Mother      Coronary Artery Disease Father      No Known Problems Sister      Diabetes Maternal Grandmother      Breast Cancer Maternal Aunt      Thyroid Disease Cousin      No Known Problems Brother      No Known Problems Maternal Grandfather      No Known Problems Paternal Grandmother      Asthma No family hx of        SOCIAL HISTORY:  Social History     Socioeconomic History      Marital status:      Spouse name: Marianna     Number of children: 2     Years of education: 16     Highest education level: None   Occupational History     Occupation: Program Leader     Comment: [employer]     Employer: [employer]    Tobacco Use     Smoking status: Former Smoker     Packs/day: 0.00     Years: 0.00     Pack years: 0.00     Types: Cigarettes     Smokeless tobacco: Never Used     Tobacco comment: Quit 31+ years ago.   Substance and Sexual Activity     Alcohol use: Yes     Alcohol/week: 0.0 standard drinks     Comment: Socially      Drug use: No     Sexual activity: Yes     Partners: Male     Birth control/protection: None     Comment: hysterectomy   Other Topics Concern     Blood Transfusions No     Special Diet No     Exercise No     Seat Belt Yes     Parent/sibling w/ CABG, MI or angioplasty before 65F 55M? No   Social History Narrative    The patient was born in [facility]. She eats fruits and vegetables every day. She takes calcium supplement and vitamin D.        No advanced care directives on file           ROS:   Constitutional: No fever, chills, or sweats. No weight gain/loss   ENT: No visual disturbance, ear ache, epistaxis, sore throat  Allergies/Immunologic: Negative.   Respiratory: No cough, hemoptysia  Cardiovascular: As per HPI  GI: No nausea, vomiting, hematemesis, melena, or hematochezia  : No urinary frequency, dysuria, or hematuria  Integument: Negative  Psychiatric: Negative  Neuro: Negative  Endocrinology: Negative   Musculoskeletal: Negative    EXAM:  LMP 10/26/2014       Labs:  LIPID RESULTS:  Lab Results   Component Value Date    CHOL 232 (H) 08/11/2022    CHOL 268 (H) 06/23/2021    HDL 60 08/11/2022    HDL 63 06/23/2021     (H) 08/11/2022     (H) 08/11/2022     (H) 06/23/2021    TRIG 67 08/11/2022    TRIG 119 06/23/2021    CHOLHDLRATIO 3.5 09/11/2015    NHDL 172 (H) 08/11/2022    NHDL 205 (H) 06/23/2021       LIVER ENZYME  RESULTS:  Lab Results   Component Value Date    AST 21 08/11/2022    AST 20 05/20/2021    ALT 36 08/11/2022    ALT 29 05/20/2021       CBC RESULTS:  Lab Results   Component Value Date    WBC 7.2 05/20/2021    RBC 4.35 05/20/2021    HGB 13.6 05/20/2021    HCT 39.1 05/20/2021    MCV 90 05/20/2021    MCH 31.3 05/20/2021    MCHC 34.8 05/20/2021    RDW 12.3 05/20/2021     05/20/2021       BMP RESULTS:  Lab Results   Component Value Date     08/11/2022     05/20/2021    POTASSIUM 4.3 08/11/2022    POTASSIUM 4.3 05/20/2021    CHLORIDE 106 08/11/2022    CHLORIDE 106 05/20/2021    CO2 24 08/11/2022    CO2 24 05/20/2021    ANIONGAP 7 08/11/2022    ANIONGAP 6 05/20/2021    GLC 85 08/11/2022    GLC 72 05/20/2021    BUN 16 08/11/2022    BUN 10 05/20/2021    CR 0.58 08/11/2022    CR 0.60 05/20/2021    GFRESTIMATED >90 08/11/2022    GFRESTIMATED >90 05/20/2021    GFRESTBLACK >90 05/20/2021    BUZZ 9.1 08/11/2022    BUZZ 8.9 05/20/2021        A1C RESULTS:  Lab Results   Component Value Date    A1C 5.2 11/11/2020           Assessment and Plan:     I discussed the results with patient.  I discussed the importance of a heart healthy diet and lifestyle.  I discussed following points with patient.     Medication Changes: None     Follow Up: Repeat fasting labs in 3 months    David Sepulveda MD, PhD  Professor of Medicine  Division of Cardiology       CC  Patient Care Team:  Tim Hill MD as PCP - General (Family Practice)  Tim Hill MD as Assigned PCP  Shauna Arceo MD as Assigned OBGYN Provider  David Sepulveda MD as Assigned Heart and Vascular Provider  Gricel Laws, RN as Specialty Care Coordinator (Cardiology)  Shital is a 51 year old who is being evaluated via a billable telephone visit.      What phone number would you like to be contacted at? [phone]  How would you like to obtain your AVS? BETH Molina/TRINITY          Please do not hesitate to contact me if you have any  questions/concerns.     Sincerely,     David Sepulveda MD

## 2022-09-03 ENCOUNTER — HEALTH MAINTENANCE LETTER (OUTPATIENT)
Age: 52
End: 2022-09-03

## 2022-09-06 DIAGNOSIS — E05.90 HYPERTHYROIDISM: Primary | ICD-10-CM

## 2022-09-12 ENCOUNTER — TELEPHONE (OUTPATIENT)
Dept: FAMILY MEDICINE | Facility: CLINIC | Age: 52
End: 2022-09-12

## 2022-09-12 NOTE — TELEPHONE ENCOUNTER
General Call    Reason for Call:  Pt would like to inform care team of immuizations    What are your questions or concerns:  Pt had new COVID booster (Moderna) and flu vaccination at [facility] on 9/11/22.     Beatrice Savage,  [facility]

## 2022-10-17 ENCOUNTER — LAB (OUTPATIENT)
Dept: LAB | Facility: CLINIC | Age: 52
End: 2022-10-17
Payer: COMMERCIAL

## 2022-10-17 DIAGNOSIS — E05.90 HYPERTHYROIDISM: ICD-10-CM

## 2022-10-17 LAB — T3FREE SERPL-MCNC: 3.1 PG/ML (ref 2–4.4)

## 2022-10-17 PROCEDURE — 36415 COLL VENOUS BLD VENIPUNCTURE: CPT

## 2022-10-17 PROCEDURE — 84481 FREE ASSAY (FT-3): CPT

## 2022-10-17 PROCEDURE — 84439 ASSAY OF FREE THYROXINE: CPT

## 2022-10-17 PROCEDURE — 84443 ASSAY THYROID STIM HORMONE: CPT

## 2022-10-18 LAB
T4 FREE SERPL-MCNC: 1.03 NG/DL (ref 0.76–1.46)
TSH SERPL DL<=0.005 MIU/L-ACNC: 0.57 MU/L (ref 0.4–4)

## 2022-12-19 ENCOUNTER — LAB (OUTPATIENT)
Dept: LAB | Facility: CLINIC | Age: 52
End: 2022-12-19
Attending: INTERNAL MEDICINE
Payer: COMMERCIAL

## 2022-12-19 DIAGNOSIS — E78.5 HYPERLIPIDEMIA LDL GOAL <100: ICD-10-CM

## 2022-12-19 LAB
CHOLEST SERPL-MCNC: 247 MG/DL
HDLC SERPL-MCNC: 52 MG/DL
LDLC SERPL CALC-MCNC: 177 MG/DL
LDLC SERPL DIRECT ASSAY-MCNC: 160 MG/DL
NONHDLC SERPL-MCNC: 195 MG/DL
TRIGL SERPL-MCNC: 92 MG/DL

## 2022-12-19 PROCEDURE — 36415 COLL VENOUS BLD VENIPUNCTURE: CPT

## 2022-12-19 PROCEDURE — 80061 LIPID PANEL: CPT

## 2022-12-19 PROCEDURE — 83721 ASSAY OF BLOOD LIPOPROTEIN: CPT | Mod: 59

## 2022-12-20 ENCOUNTER — MYC MEDICAL ADVICE (OUTPATIENT)
Dept: CARDIOLOGY | Facility: CLINIC | Age: 52
End: 2022-12-20

## 2023-01-02 ENCOUNTER — VIRTUAL VISIT (OUTPATIENT)
Dept: CARDIOLOGY | Facility: CLINIC | Age: 53
End: 2023-01-02
Attending: INTERNAL MEDICINE
Payer: COMMERCIAL

## 2023-01-02 DIAGNOSIS — E78.5 HYPERLIPIDEMIA LDL GOAL <100: Primary | ICD-10-CM

## 2023-01-02 PROCEDURE — G0463 HOSPITAL OUTPT CLINIC VISIT: HCPCS | Mod: PN,GT | Performed by: INTERNAL MEDICINE

## 2023-01-02 PROCEDURE — 99214 OFFICE O/P EST MOD 30 MIN: CPT | Mod: GT | Performed by: INTERNAL MEDICINE

## 2023-01-02 RX ORDER — PRAVASTATIN SODIUM 10 MG
10 TABLET ORAL DAILY
Qty: 90 TABLET | Refills: 3 | Status: SHIPPED | OUTPATIENT
Start: 2023-01-02 | End: 2023-03-27

## 2023-01-02 RX ORDER — FEXOFENADINE HCL 180 MG/1
180 TABLET ORAL DAILY
COMMUNITY

## 2023-01-02 NOTE — LETTER
1/2/2023      RE: Shital Parrish  [address]  [place] [address]       Dear Colleague,    Thank you for the opportunity to participate in the care of your patient, Shital Parrish, at the [facility]ART [facility] [place] at [facility]. Please see a copy of my visit note below.      Patient is eligible for a video visit    Location:     Patient: Home  Dr Sepulveda: [facility] [place]    Time:    Start: 6.00 pm  Stop: 6.30 pm    Video system: Stereotaxis    HPI: had the privilege to evaluate and examine Ms Shital Parrish, who is 52 yr female patient with heterozygeous hypercholesterolemia.  Patient denies chest pain, shortness of breath and intermittent claudication.  Patient does not complaint about limitations during effort.  Patient has no palpitations.    PAST MEDICAL HISTORY:  Past Medical History:   Diagnosis Date     Edema 10/24/2013     Gastro-oesophageal reflux disease      Heavy periods 10/24/2013     Hyperthyroidism 9/26/2014    borderline-treated with a med for 6 months in [place] and then numbers improved. had a low TSH here and referred to [facility]. numbers there were borderline but normal and no antibodies. rec. 12/14-I123 uptake was increased so Graves dz dx'd. may do methimazole if repeat TFTs show hyperthyroid     Leiomyoma of uterus, unspecified 9/26/2014     Nipple discharge 09/14    right side only. neg mammo and right breast u/s. MRI pending     PONV (postoperative nausea and vomiting)      Post-menopause on HRT (hormone replacement therapy) 6/26/2021     Pure hypercholesterolemia 6/26/2021     Varicose veins of lower extremities with other complications 10/24/2013       CURRENT MEDICATIONS:  Current Outpatient Medications   Medication Sig Dispense Refill     estradiol (VIVELLE-DOT) 0.075 MG/24HR BIW patch Place 1 patch onto the skin twice a week 24 patch 3     fexofenadine (ALLEGRA) 180 MG tablet Take 180 mg by mouth daily       methimazole (TAPAZOLE) 5 MG tablet TAKE  1/2 TABLET BY MOUTH DAILY       pravastatin (PRAVACHOL) 10 MG tablet Take 1 tablet (10 mg) by mouth daily 90 tablet 3     cholecalciferol (VITAMIN D3) 125 mcg (5000 units) capsule  (Patient not taking: Reported on 2023)       ciclopirox (PENLAC) 8 % external solution Apply 8 Application topically as needed To nails nails (Patient not taking: Reported on 8/10/2022)       EPIDUO FORTE 0.3-2.5 % gel Apply 0.3 Pump topically as needed (Patient not taking: Reported on 8/10/2022)       olopatadine (PATANOL) 0.1 % ophthalmic solution INSTILL 1 DROP INTO BOTH EYES TWICE A DAY (Patient not taking: Reported on 8/10/2022) 5 mL 11       PAST SURGICAL HISTORY:  Past Surgical History:   Procedure Laterality Date     APPENDECTOMY        SECTION       CHOLECYSTECTOMY, LAPOROSCOPIC  2012    Cholecystectomy, Laparoscopic     COLONOSCOPY N/A 2017    Procedure: COLONOSCOPY;  COLONOSCOPY;  Surgeon: Shannon Noel MD;  Location:  GI     COLONOSCOPY N/A 2019    Procedure: COLONOSCOPY;  Surgeon: Shannon Noel MD;  Location:  GI     HYSTERECTOMY SUPRACERVICAL N/A 2014    Procedure: HYSTERECTOMY SUPRACERVICAL;  Surgeon: Shauna Arceo MD;  Location:  OR [facility] and [facility] done by [facility]/ [facility]. started as laparoscopic but converted to open due to adhesions     LAPAROSCOPIC CYSTECTOMY OVARIAN (ONCOLOGY)      right. benign cyst. partial oopherectomy in [facility]     LAPAROSCOPIC SALPINGO-OOPHORECTOMY  14     MYOMECTOMY UTERUS  2008    hysteroscopic     TONSILLECTOMY  1979       ALLERGIES     Allergies   Allergen Reactions     Penicillins      Seasonal Allergies      Amoxicillin Rash       FAMILY HISTORY:  Family History   Problem Relation Age of Onset     Diabetes Mother      Cancer Mother         melanoma     Colon Polyps Mother      Coronary Artery Disease Father      No Known Problems Sister      Diabetes Maternal Grandmother      Breast Cancer Maternal Aunt      Thyroid Disease  Cousin      No Known Problems Brother      No Known Problems Maternal Grandfather      No Known Problems Paternal Grandmother      Asthma No family hx of        SOCIAL HISTORY:  Social History     Socioeconomic History     Marital status:      Spouse name: Marianna     Number of children: 2     Years of education: 16     Highest education level: None   Occupational History     Occupation: Program Leader     Comment: [employer]     Employer: [employer]    Tobacco Use     Smoking status: Former     Packs/day: 0.00     Years: 0.00     Pack years: 0.00     Types: Cigarettes     Smokeless tobacco: Never     Tobacco comments:     Quit 31+ years ago.   Substance and Sexual Activity     Alcohol use: Yes     Alcohol/week: 0.0 standard drinks     Comment: Socially      Drug use: No     Sexual activity: Yes     Partners: Male     Birth control/protection: None     Comment: hysterectomy   Other Topics Concern     Blood Transfusions No     Special Diet No     Exercise No     Seat Belt Yes     Parent/sibling w/ CABG, MI or angioplasty before 65F 55M? No   Social History Narrative    The patient was born in [facility]. She eats fruits and vegetables every day. She takes calcium supplement and vitamin D.        No advanced care directives on file           ROS:   Constitutional: No fever, chills, or sweats. No weight gain/loss   ENT: No visual disturbance, ear ache, epistaxis, sore throat  Allergies/Immunologic: Negative.   Respiratory: No cough, hemoptysia  Cardiovascular: As per HPI  GI: No nausea, vomiting, hematemesis, melena, or hematochezia  : No urinary frequency, dysuria, or hematuria  Integument: Negative  Psychiatric: Negative  Neuro: Negative  Endocrinology: Negative   Musculoskeletal: Negative    EXAM:  Virtual    No vitals    Labs:  LIPID RESULTS:  Lab Results   Component Value Date    CHOL 247 (H) 12/19/2022    CHOL 268 (H) 06/23/2021    HDL 52 12/19/2022    HDL 63 06/23/2021     (H)  12/19/2022     (H) 12/19/2022     (H) 06/23/2021    TRIG 92 12/19/2022    TRIG 119 06/23/2021    CHOLHDLRATIO 3.5 09/11/2015    NHDL 195 (H) 12/19/2022    NHDL 205 (H) 06/23/2021       LIVER ENZYME RESULTS:  Lab Results   Component Value Date    AST 21 08/11/2022    AST 20 05/20/2021    ALT 36 08/11/2022    ALT 29 05/20/2021       CBC RESULTS:  Lab Results   Component Value Date    WBC 7.2 05/20/2021    RBC 4.35 05/20/2021    HGB 13.6 05/20/2021    HCT 39.1 05/20/2021    MCV 90 05/20/2021    MCH 31.3 05/20/2021    MCHC 34.8 05/20/2021    RDW 12.3 05/20/2021     05/20/2021       BMP RESULTS:  Lab Results   Component Value Date     08/11/2022     05/20/2021    POTASSIUM 4.3 08/11/2022    POTASSIUM 4.3 05/20/2021    CHLORIDE 106 08/11/2022    CHLORIDE 106 05/20/2021    CO2 24 08/11/2022    CO2 24 05/20/2021    ANIONGAP 7 08/11/2022    ANIONGAP 6 05/20/2021    GLC 85 08/11/2022    GLC 72 05/20/2021    BUN 16 08/11/2022    BUN 10 05/20/2021    CR 0.58 08/11/2022    CR 0.60 05/20/2021    GFRESTIMATED >90 08/11/2022    GFRESTIMATED >90 05/20/2021    GFRESTBLACK >90 05/20/2021    BUZZ 9.1 08/11/2022    BUZZ 8.9 05/20/2021        A1C RESULTS:  Lab Results   Component Value Date    A1C 5.2 11/11/2020               Assessment and Plan:     I discussed the results with patient.  I discussed the importance of a heart healthy diet and lifestyle.  I discussed following points with patient.     Medication Changes: None     Follow Up: Repeat fasting labs in 6-12  months     David Sepulveda MD, PhD  Professor of Medicine  Division of Cardiology        CC  Patient Care Team:  Tim Hill MD as PCP - General (Family Practice)  Shauna Arceo MD as Assigned OBGYN Provider  David Sepulveda MD as Assigned Heart and Vascular Provider  Gricel Laws RN as Specialty Care Coordinator (Cardiology)

## 2023-01-02 NOTE — NURSING NOTE
Shital is a 52 year old who is being evaluated via a billable video visit.      How would you like to obtain your AVS? CalendlyharVicarious  If the video visit is dropped, the invitation should be resent by: Text to cell phone: [phone]  Will anyone else be joining your video visit? No        Video-Visit Details    Type of service:  Video Visit     Originating Location (pt. Location): Home    Distant Location (provider location):  On-site  Platform used for Video Visit: JessicaWell

## 2023-01-03 NOTE — PATIENT INSTRUCTIONS
January 2, 2023    Cardiology Provider You Saw During Your Visit: Dr. Sepulveda      Medication Changes: Start pravastatin 10 mg daily      Follow Up:   -Repeat fasting labs in 3 months        Follow the American Heart Association Diet and Lifestyle Recommendations:  -Limit saturated fat, trans fat, sodium, red meat, sweets and sugar-sweetened beverages. If you choose to eat red meat, compare labels and select the leanest cuts available.  -Aim for at least 150 minutes of moderate physical activity or 75 minutes of vigorous physical activity - or an equal combination of both - each week.      To Reach Us:  -During business hours: [phone], press option # 1 to schedule an appointment or to leave a message for your care team.     -After hours, weekends or holidays: [phone], press option #4 and ask to speak to the on-call cardiologist. Inform them you are a patient at the [facility].      Gricel Laws RN  Cardiology Care Coordinator - General Cardiology  [facility]

## 2023-01-15 NOTE — PROGRESS NOTES
Patient is eligible for a video visit    Location:     Patient: Home  [place] [facility] [place]    Start: 6.00 pm  Stop: 6.30 pm    Video system: [employer]    HPI: had the privilege to evaluate and examine Ms Shital Parrish, who is 52 yr female patient with heterozygeous hypercholesterolemia.  Patient denies chest pain, shortness of breath and intermittent claudication.  Patient does not complaint about limitations during effort.  Patient has no palpitations.    PAST MEDICAL HISTORY:  Past Medical History:   Diagnosis Date     Edema 10/24/2013     Gastro-oesophageal reflux disease      Heavy periods 10/24/2013     Hyperthyroidism 9/26/2014    borderline-treated with a med for 6 months in [place] and then numbers improved. had a low TSH here and referred to [facility]. numbers there were borderline but normal and no antibodies. rec. 12/14-I123 uptake was increased so Graves dz dx'd. may do methimazole if repeat TFTs show hyperthyroid     Leiomyoma of uterus, unspecified 9/26/2014     Nipple discharge 09/14    right side only. neg mammo and right breast u/s. MRI pending     PONV (postoperative nausea and vomiting)      Post-menopause on HRT (hormone replacement therapy) 6/26/2021     Pure hypercholesterolemia 6/26/2021     Varicose veins of lower extremities with other complications 10/24/2013       CURRENT MEDICATIONS:  Current Outpatient Medications   Medication Sig Dispense Refill     estradiol (VIVELLE-DOT) 0.075 MG/24HR BIW patch Place 1 patch onto the skin twice a week 24 patch 3     fexofenadine (ALLEGRA) 180 MG tablet Take 180 mg by mouth daily       methimazole (TAPAZOLE) 5 MG tablet TAKE 1/2 TABLET BY MOUTH DAILY       pravastatin (PRAVACHOL) 10 MG tablet Take 1 tablet (10 mg) by mouth daily 90 tablet 3     cholecalciferol (VITAMIN D3) 125 mcg (5000 units) capsule  (Patient not taking: Reported on 1/2/2023)       ciclopirox (PENLAC) 8 % external solution Apply 8 Application topically as needed To nails nails (Patient  not taking: Reported on 8/10/2022)       EPIDUO FORTE 0.3-2.5 % gel Apply 0.3 Pump topically as needed (Patient not taking: Reported on 8/10/2022)       olopatadine (PATANOL) 0.1 % ophthalmic solution INSTILL 1 DROP INTO BOTH EYES TWICE A DAY (Patient not taking: Reported on 8/10/2022) 5 mL 11       PAST SURGICAL HISTORY:  Past Surgical History:   Procedure Laterality Date     APPENDECTOMY        SECTION       CHOLECYSTECTOMY, LAPOROSCOPIC  2012    Cholecystectomy, Laparoscopic     COLONOSCOPY N/A 2017    Procedure: COLONOSCOPY;  COLONOSCOPY;  Surgeon: Shannon Noel MD;  Location:  GI     COLONOSCOPY N/A 2019    Procedure: COLONOSCOPY;  Surgeon: Shannon Noel MD;  Location:  GI     HYSTERECTOMY SUPRACERVICAL N/A 2014    Procedure: HYSTERECTOMY SUPRACERVICAL;  Surgeon: Shauna Arceo MD;  Location:  OR [facility] and L[facility] done by Adis/ Leora. started as laparoscopic but converted to open due to adhesions     LAPAROSCOPIC CYSTECTOMY OVARIAN (ONCOLOGY)      right. benign cyst. partial oopherectomy in [facility]     LAPAROSCOPIC SALPINGO-OOPHORECTOMY  14     MYOMECTOMY UTERUS  2008    hysteroscopic     TONSILLECTOMY  1979       ALLERGIES     Allergies   Allergen Reactions     Penicillins      Seasonal Allergies      Amoxicillin Rash       FAMILY HISTORY:  Family History   Problem Relation Age of Onset     Diabetes Mother      Cancer Mother         melanoma     Colon Polyps Mother      Coronary Artery Disease Father      No Known Problems Sister      Diabetes Maternal Grandmother      Breast Cancer Maternal Aunt      Thyroid Disease Cousin      No Known Problems Brother      No Known Problems Maternal Grandfather      No Known Problems Paternal Grandmother      Asthma No family hx of        SOCIAL HISTORY:  Social History     Socioeconomic History     Marital status:      Spouse name: Marianna     Number of children: 2     Years of education: 16     Highest  education level: None   Occupational History     Occupation: Program Leader     Comment: [employer]     Employer: [employer]    Tobacco Use     Smoking status: Former     Packs/day: 0.00     Years: 0.00     Pack years: 0.00     Types: Cigarettes     Smokeless tobacco: Never     Tobacco comments:     Quit 31+ years ago.   Substance and Sexual Activity     Alcohol use: Yes     Alcohol/week: 0.0 standard drinks     Comment: Socially      Drug use: No     Sexual activity: Yes     Partners: Male     Birth control/protection: None     Comment: hysterectomy   Other Topics Concern     Blood Transfusions No     Special Diet No     Exercise No     Seat Belt Yes     Parent/sibling w/ CABG, MI or angioplasty before 65F 55M? No   Social History Narrative    The patient was born in [place]. She eats fruits and vegetables every day. She takes calcium supplement and vitamin D.        No advanced care directives on file           ROS:   Constitutional: No fever, chills, or sweats. No weight gain/loss   ENT: No visual disturbance, ear ache, epistaxis, sore throat  Allergies/Immunologic: Negative.   Respiratory: No cough, hemoptysia  Cardiovascular: As per HPI  GI: No nausea, vomiting, hematemesis, melena, or hematochezia  : No urinary frequency, dysuria, or hematuria  Integument: Negative  Psychiatric: Negative  Neuro: Negative  Endocrinology: Negative   Musculoskeletal: Negative    EXAM:  Virtual    No vitals    Labs:  LIPID RESULTS:  Lab Results   Component Value Date    CHOL 247 (H) 12/19/2022    CHOL 268 (H) 06/23/2021    HDL 52 12/19/2022    HDL 63 06/23/2021     (H) 12/19/2022     (H) 12/19/2022     (H) 06/23/2021    TRIG 92 12/19/2022    TRIG 119 06/23/2021    CHOLHDLRATIO 3.5 09/11/2015    NHDL 195 (H) 12/19/2022    NHDL 205 (H) 06/23/2021       LIVER ENZYME RESULTS:  Lab Results   Component Value Date    AST 21 08/11/2022    AST 20 05/20/2021    ALT 36 08/11/2022    ALT 29 05/20/2021        CBC RESULTS:  Lab Results   Component Value Date    WBC 7.2 05/20/2021    RBC 4.35 05/20/2021    HGB 13.6 05/20/2021    HCT 39.1 05/20/2021    MCV 90 05/20/2021    MCH 31.3 05/20/2021    MCHC 34.8 05/20/2021    RDW 12.3 05/20/2021     05/20/2021       BMP RESULTS:  Lab Results   Component Value Date     08/11/2022     05/20/2021    POTASSIUM 4.3 08/11/2022    POTASSIUM 4.3 05/20/2021    CHLORIDE 106 08/11/2022    CHLORIDE 106 05/20/2021    CO2 24 08/11/2022    CO2 24 05/20/2021    ANIONGAP 7 08/11/2022    ANIONGAP 6 05/20/2021    GLC 85 08/11/2022    GLC 72 05/20/2021    BUN 16 08/11/2022    BUN 10 05/20/2021    CR 0.58 08/11/2022    CR 0.60 05/20/2021    GFRESTIMATED >90 08/11/2022    GFRESTIMATED >90 05/20/2021    GFRESTBLACK >90 05/20/2021    BUZZ 9.1 08/11/2022    BUZZ 8.9 05/20/2021        A1C RESULTS:  Lab Results   Component Value Date    A1C 5.2 11/11/2020               Assessment and Plan:     I discussed the results with patient.  I discussed the importance of a heart healthy diet and lifestyle.  I discussed following points with patient.     Medication Changes: None     Follow Up: Repeat fasting labs in 6-12  months     David Sepulveda MD, PhD  Professor of Medicine  Division of Cardiology            CC  Patient Care Team:  Tim Hill MD as PCP - General (Family Practice)  Tim Hill MD as Assigned PCP  Shauna Arceo MD as Assigned OBGYN Provider  David Sepulveda MD as Assigned Heart and Vascular Provider  Gricel Laws, RN as Specialty Care Coordinator (Cardiology)  SELF, REFERRED

## 2023-03-24 ENCOUNTER — LAB (OUTPATIENT)
Dept: LAB | Facility: CLINIC | Age: 53
End: 2023-03-24
Payer: COMMERCIAL

## 2023-03-24 DIAGNOSIS — E78.5 HYPERLIPIDEMIA LDL GOAL <100: ICD-10-CM

## 2023-03-24 LAB
ALBUMIN SERPL BCG-MCNC: 4 G/DL (ref 3.5–5.2)
ALP SERPL-CCNC: 44 U/L (ref 35–104)
ALT SERPL W P-5'-P-CCNC: 14 U/L (ref 10–35)
ANION GAP SERPL CALCULATED.3IONS-SCNC: 13 MMOL/L (ref 7–15)
AST SERPL W P-5'-P-CCNC: 17 U/L (ref 10–35)
BILIRUB SERPL-MCNC: 0.3 MG/DL
BUN SERPL-MCNC: 18.2 MG/DL (ref 6–20)
CALCIUM SERPL-MCNC: 8.8 MG/DL (ref 8.6–10)
CHLORIDE SERPL-SCNC: 103 MMOL/L (ref 98–107)
CHOLEST SERPL-MCNC: 184 MG/DL
CREAT SERPL-MCNC: 0.68 MG/DL (ref 0.51–0.95)
DEPRECATED HCO3 PLAS-SCNC: 23 MMOL/L (ref 22–29)
GFR SERPL CREATININE-BSD FRML MDRD: >90 ML/MIN/1.73M2
GLUCOSE SERPL-MCNC: 98 MG/DL (ref 70–99)
HDLC SERPL-MCNC: 49 MG/DL
LDLC SERPL CALC-MCNC: 120 MG/DL
LDLC SERPL DIRECT ASSAY-MCNC: 118 MG/DL
NONHDLC SERPL-MCNC: 135 MG/DL
POTASSIUM SERPL-SCNC: 4.3 MMOL/L (ref 3.4–5.3)
PROT SERPL-MCNC: 6.5 G/DL (ref 6.4–8.3)
SODIUM SERPL-SCNC: 139 MMOL/L (ref 136–145)
TRIGL SERPL-MCNC: 73 MG/DL

## 2023-03-24 PROCEDURE — 80061 LIPID PANEL: CPT

## 2023-03-24 PROCEDURE — 80053 COMPREHEN METABOLIC PANEL: CPT

## 2023-03-24 PROCEDURE — 36415 COLL VENOUS BLD VENIPUNCTURE: CPT

## 2023-03-24 PROCEDURE — 83721 ASSAY OF BLOOD LIPOPROTEIN: CPT | Mod: 59

## 2023-03-27 ENCOUNTER — TELEPHONE (OUTPATIENT)
Dept: CARDIOLOGY | Facility: CLINIC | Age: 53
End: 2023-03-27
Payer: COMMERCIAL

## 2023-03-27 ENCOUNTER — VIRTUAL VISIT (OUTPATIENT)
Dept: CARDIOLOGY | Facility: CLINIC | Age: 53
End: 2023-03-27
Attending: INTERNAL MEDICINE
Payer: COMMERCIAL

## 2023-03-27 DIAGNOSIS — E78.5 HYPERLIPIDEMIA LDL GOAL <100: ICD-10-CM

## 2023-03-27 PROCEDURE — 99214 OFFICE O/P EST MOD 30 MIN: CPT | Mod: VID | Performed by: INTERNAL MEDICINE

## 2023-03-27 RX ORDER — PRAVASTATIN SODIUM 20 MG
20 TABLET ORAL DAILY
Qty: 90 TABLET | Refills: 3 | Status: SHIPPED | OUTPATIENT
Start: 2023-03-27 | End: 2024-03-17

## 2023-03-27 NOTE — NURSING NOTE
Chief Complaint   Patient presents with     Follow Up     No other pt reported vitals to report today     Is the patient currently in the state of [place]? YES    Visit mode:VIDEO    If the visit is dropped, the patient can be reconnected by: VIDEO VISIT: Text to cell phone: [phone]    Will anyone else be joining the visit? NO      How would you like to obtain your AVS? MyChart    Are changes needed to the allergy or medication list? YES: medications flagged for removal    BETH Rich/CMA

## 2023-03-27 NOTE — PATIENT INSTRUCTIONS
March 27, 2023    Cardiology Provider You Saw During Your Visit: Dr. Sepulveda      Medication Changes: Increase pravastatin according to schedule below:  -For 3 weeks: Take 20 mg on Mondays and Thursdays. Take 10 mg the other days.  -For the next 3 weeks: Alternate between 10 and 20 mg every other day.  -Then: Increase to 20 mg daily      Follow Up:   -Fasting labs in June or July, with a virtual visit sometime after.      Follow the American Heart Association Diet and Lifestyle Recommendations:  -Limit saturated fat, trans fat, sodium, red meat, sweets and sugar-sweetened beverages. If you choose to eat red meat, compare labels and select the leanest cuts available.  -Aim for at least 150 minutes of moderate physical activity or 75 minutes of vigorous physical activity - or an equal combination of both - each week.      To Reach Us:  -During business hours: [phone], press option # 1 to schedule an appointment or to leave a message for your care team.     -After hours, weekends or holidays: [phone], press option #4 and ask to speak to the on-call cardiologist. Inform them you are a patient at the [facility].      Gricel Laws RN  Cardiology Care Coordinator - General Cardiology  [facility]

## 2023-03-27 NOTE — PROGRESS NOTES
Patient is eligible for a video visit    Time    Start: 6.30 pm    Stop: 6.55 pm    Location    Patient: home     [place] Sepulveda[place] [facility] [place]    Video system    [employer]      HPI:   I had the privilege to evaluate and examine Ms Shital Parrish, who is 52 yr female patient with heterozygeous hypercholesterolemia.  Patient denies chest pain, shortness of breath and intermittent claudication.  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  Patient has no palpitations.    PAST MEDICAL HISTORY:  Past Medical History:   Diagnosis Date     Edema 10/24/2013     Gastro-oesophageal reflux disease      Heavy periods 10/24/2013     Hyperthyroidism 9/26/2014    borderline-treated with a med for 6 months in [place] and then numbers improved. had a low TSH here and referred to [facility]. numbers there were borderline but normal and no antibodies. rec. 12/14-I123 uptake was increased so Graves dz dx'd. may do methimazole if repeat TFTs show hyperthyroid     Leiomyoma of uterus, unspecified 9/26/2014     Nipple discharge 09/14    right side only. neg mammo and right breast u/s. MRI pending     PONV (postoperative nausea and vomiting)      Post-menopause on HRT (hormone replacement therapy) 6/26/2021     Pure hypercholesterolemia 6/26/2021     Varicose veins of lower extremities with other complications 10/24/2013       CURRENT MEDICATIONS:  Current Outpatient Medications   Medication Sig Dispense Refill     estradiol (VIVELLE-DOT) 0.075 MG/24HR BIW patch Place 1 patch onto the skin twice a week 24 patch 3     fexofenadine (ALLEGRA) 180 MG tablet Take 180 mg by mouth daily       methimazole (TAPAZOLE) 5 MG tablet TAKE 1/2 TABLET BY MOUTH DAILY       pravastatin (PRAVACHOL) 10 MG tablet Take 1 tablet (10 mg) by mouth daily 90 tablet 3     cholecalciferol (VITAMIN D3) 125 mcg (5000 units) capsule  (Patient not taking: Reported on 1/2/2023)        ciclopirox (PENLAC) 8 % external solution Apply 8 Application topically as needed To nails nails (Patient not taking: Reported on 8/10/2022)       EPIDUO FORTE 0.3-2.5 % gel Apply 0.3 Pump topically as needed (Patient not taking: Reported on 8/10/2022)       olopatadine (PATANOL) 0.1 % ophthalmic solution INSTILL 1 DROP INTO BOTH EYES TWICE A DAY (Patient not taking: Reported on 8/10/2022) 5 mL 11       PAST SURGICAL HISTORY:  Past Surgical History:   Procedure Laterality Date     APPENDECTOMY        SECTION       CHOLECYSTECTOMY, LAPOROSCOPIC  2012    Cholecystectomy, Laparoscopic     COLONOSCOPY N/A 2017    Procedure: COLONOSCOPY;  COLONOSCOPY;  Surgeon: Shannon Noel MD;  Location:  GI     COLONOSCOPY N/A 2019    Procedure: COLONOSCOPY;  Surgeon: Shannon Noel MD;  Location:  GI     HYSTERECTOMY SUPRACERVICAL N/A 2014    Procedure: HYSTERECTOMY SUPRACERVICAL;  Surgeon: Shauna Arceo MD;  [facility]. started as laparoscopic but converted to open due to adhesions     LAPAROSCOPIC CYSTECTOMY OVARIAN (ONCOLOGY)      right. benign cyst. partial oopherectomy in [facility]     LAPAROSCOPIC SALPINGO-OOPHORECTOMY  14     MYOMECTOMY UTERUS  2008    hysteroscopic     TONSILLECTOMY  1979       ALLERGIES     Allergies   Allergen Reactions     Penicillins      Seasonal Allergies      Amoxicillin Rash       FAMILY HISTORY:  Family History   Problem Relation Age of Onset     Diabetes Mother      Cancer Mother         melanoma     Colon Polyps Mother      Coronary Artery Disease Father      No Known Problems Sister      Diabetes Maternal Grandmother      Breast Cancer Maternal Aunt      Thyroid Disease Cousin      No Known Problems Brother      No Known Problems Maternal Grandfather      No Known Problems Paternal Grandmother      Asthma No family hx of        SOCIAL HISTORY:  Social History     Socioeconomic History     Marital  status:      Spouse name: Marianna     Number of children: 2     Years of education: 16     Highest education level: None   Occupational History     Occupation: Program Leader     Comment: [employer]     Employer: [employer]    Tobacco Use     Smoking status: Former     Packs/day: 0.00     Years: 0.00     Pack years: 0.00     Types: Cigarettes     Smokeless tobacco: Never     Tobacco comments:     Quit 31+ years ago.   Substance and Sexual Activity     Alcohol use: Yes     Alcohol/week: 0.0 standard drinks     Comment: Socially      Drug use: No     Sexual activity: Yes     Partners: Male     Birth control/protection: None     Comment: hysterectomy   Other Topics Concern     Blood Transfusions No     Special Diet No     Exercise No     Seat Belt Yes     Parent/sibling w/ CABG, MI or angioplasty before 65F 55M? No   Social History Narrative    The patient was born in [place]. She eats fruits and vegetables every day. She takes calcium supplement and vitamin D.        No advanced care directives on file           ROS:   Constitutional: No fever, chills, or sweats. No weight gain/loss   ENT: No visual disturbance, ear ache, epistaxis, sore throat  Allergies/Immunologic: Negative.   Respiratory: No cough, hemoptysia  Cardiovascular: As per HPI  GI: No nausea, vomiting, hematemesis, melena, or hematochezia  : No urinary frequency, dysuria, or hematuria  Integument: Negative  Psychiatric: Negative  Neuro: Negative  Endocrinology: Negative   Musculoskeletal: Negative    EXAM:  LMP 10/26/2014     Video visit.    Labs:  LIPID RESULTS:  Lab Results   Component Value Date    CHOL 184 03/24/2023    CHOL 268 (H) 06/23/2021    HDL 49 (L) 03/24/2023    HDL 63 06/23/2021     (H) 03/24/2023     (H) 03/24/2023     (H) 06/23/2021    TRIG 73 03/24/2023    TRIG 119 06/23/2021    CHOLHDLRATIO 3.5 09/11/2015    NHDL 135 (H) 03/24/2023    NHDL 205 (H) 06/23/2021       LIVER ENZYME RESULTS:  Lab  Results   Component Value Date    AST 17 03/24/2023    AST 20 05/20/2021    ALT 14 03/24/2023    ALT 29 05/20/2021       CBC RESULTS:  Lab Results   Component Value Date    WBC 7.2 05/20/2021    RBC 4.35 05/20/2021    HGB 13.6 05/20/2021    HCT 39.1 05/20/2021    MCV 90 05/20/2021    MCH 31.3 05/20/2021    MCHC 34.8 05/20/2021    RDW 12.3 05/20/2021     05/20/2021       BMP RESULTS:  Lab Results   Component Value Date     03/24/2023     05/20/2021    POTASSIUM 4.3 03/24/2023    POTASSIUM 4.3 08/11/2022    POTASSIUM 4.3 05/20/2021    CHLORIDE 103 03/24/2023    CHLORIDE 106 08/11/2022    CHLORIDE 106 05/20/2021    CO2 23 03/24/2023    CO2 24 08/11/2022    CO2 24 05/20/2021    ANIONGAP 13 03/24/2023    ANIONGAP 7 08/11/2022    ANIONGAP 6 05/20/2021    GLC 98 03/24/2023    GLC 85 08/11/2022    GLC 72 05/20/2021    BUN 18.2 03/24/2023    BUN 16 08/11/2022    BUN 10 05/20/2021    CR 0.68 03/24/2023    CR 0.60 05/20/2021    GFRESTIMATED >90 03/24/2023    GFRESTIMATED >90 05/20/2021    GFRESTBLACK >90 05/20/2021    BUZZ 8.8 03/24/2023    BUZZ 8.9 05/20/2021        A1C RESULTS:  Lab Results   Component Value Date    A1C 5.2 11/11/2020           Assessment and Plan:     We discussed the results with patient.  We discussed the importance of a heart healthy lifestyle and diet.       Medication Changes: Increase pravastatin according to schedule below:  -For 3 weeks: Take 20 mg on Mondays and Thursdays. Take 10 mg the other days.  -For the next 3 weeks: Alternate between 10 and 20 mg every other day.  -Then: Increase to 20 mg daily        Follow Up:   -Fasting labs in June or July, with a virtual visit sometime after.        Follow the American Heart Association Diet and Lifestyle Recommendations:  -Limit saturated fat, trans fat, sodium, red meat, sweets and sugar-sweetened beverages. If you choose to eat red meat, compare labels and select the leanest cuts available.  -Aim for at least 150 minutes of moderate  physical activity or 75 minutes of vigorous physical activity - or an equal combination of both - each week.      25 min were spent directly with patient during video visit.    David Sepulveda MD, PhD  Professor of Medicine  Division of Cardiology         CC  Patient Care Team:  Tim Hill MD as PCP - General (Family Practice)  Tim Hill MD as Assigned PCP  Shauna Arceo MD as Assigned OBGYN Provider  David Sepulveda MD as Assigned Heart and Vascular Provider  Gricel Laws RN as Specialty Care Coordinator (Cardiology)

## 2023-03-27 NOTE — TELEPHONE ENCOUNTER
Please help schedule fasting labs in June or July, with a virtual visit with Derick sometime after labs.

## 2023-03-27 NOTE — LETTER
3/27/2023      RE: Shital Parrish  [address]  [place] [address]       Dear Colleague,    Thank you for the opportunity to participate in the care of your patient, Shital Parrish, at the [facility] [place] [facility]. Please see a copy of my visit note below.      Patient is eligible for a video visit    Time    Start: 6.30 pm    Stop: 6.55 pm    Location    Patient: home     Dr Sepulveda: [facility]    Video system    [employer]      HPI:   I had the privilege to evaluate and examine Ms Shital Parrish, who is 52 yr female patient with heterozygeous hypercholesterolemia.  Patient denies chest pain, shortness of breath and intermittent claudication.  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  Patient has no palpitations.    PAST MEDICAL HISTORY:  Past Medical History:   Diagnosis Date     Edema 10/24/2013     Gastro-oesophageal reflux disease      Heavy periods 10/24/2013     Hyperthyroidism 9/26/2014    borderline-treated with a med for 6 months in [place] and then numbers improved. had a low TSH here and referred to [facility]. numbers there were borderline but normal and no antibodies. rec. 12/14-I123 uptake was increased so Graves dz dx'd. may do methimazole if repeat TFTs show hyperthyroid     Leiomyoma of uterus, unspecified 9/26/2014     Nipple discharge 09/14    right side only. neg mammo and right breast u/s. MRI pending     PONV (postoperative nausea and vomiting)      Post-menopause on HRT (hormone replacement therapy) 6/26/2021     Pure hypercholesterolemia 6/26/2021     Varicose veins of lower extremities with other complications 10/24/2013       CURRENT MEDICATIONS:  Current Outpatient Medications   Medication Sig Dispense Refill     estradiol (VIVELLE-DOT) 0.075 MG/24HR BIW patch Place 1 patch onto the skin twice a week 24 patch 3      fexofenadine (ALLEGRA) 180 MG tablet Take 180 mg by mouth daily       methimazole (TAPAZOLE) 5 MG tablet TAKE 1/2 TABLET BY MOUTH DAILY       pravastatin (PRAVACHOL) 10 MG tablet Take 1 tablet (10 mg) by mouth daily 90 tablet 3     cholecalciferol (VITAMIN D3) 125 mcg (5000 units) capsule  (Patient not taking: Reported on 2023)       ciclopirox (PENLAC) 8 % external solution Apply 8 Application topically as needed To nails nails (Patient not taking: Reported on 8/10/2022)       EPIDUO FORTE 0.3-2.5 % gel Apply 0.3 Pump topically as needed (Patient not taking: Reported on 8/10/2022)       olopatadine (PATANOL) 0.1 % ophthalmic solution INSTILL 1 DROP INTO BOTH EYES TWICE A DAY (Patient not taking: Reported on 8/10/2022) 5 mL 11       PAST SURGICAL HISTORY:  Past Surgical History:   Procedure Laterality Date     APPENDECTOMY        SECTION       CHOLECYSTECTOMY, LAPOROSCOPIC  2012    Cholecystectomy, Laparoscopic     COLONOSCOPY N/A 2017    Procedure: COLONOSCOPY;  COLONOSCOPY;  Surgeon: Shannon Noel MD;  Location:  GI     COLONOSCOPY N/A 2019    Procedure: COLONOSCOPY;  Surgeon: Shannon Noel MD;  Location:  GI     HYSTERECTOMY SUPRACERVICAL N/A 2014    Procedure: HYSTERECTOMY SUPRACERVICAL;  Surgeon: Shauna Arceo MD;  Location:  OR [facility] and LSO done by Adis/ [facility]. started as laparoscopic but converted to open due to adhesions     LAPAROSCOPIC CYSTECTOMY OVARIAN (ONCOLOGY)      right. benign cyst. partial oopherectomy in [facility]     LAPAROSCOPIC SALPINGO-OOPHORECTOMY  14     MYOMECTOMY UTERUS  2008    hysteroscopic     TONSILLECTOMY  1979       ALLERGIES     Allergies   Allergen Reactions     Penicillins      Seasonal Allergies      Amoxicillin Rash       FAMILY HISTORY:  Family History   Problem Relation Age of Onset     Diabetes Mother      Cancer Mother         melanoma     Colon Polyps Mother      Coronary Artery Disease Father      No  Known Problems Sister      Diabetes Maternal Grandmother      Breast Cancer Maternal Aunt      Thyroid Disease Cousin      No Known Problems Brother      No Known Problems Maternal Grandfather      No Known Problems Paternal Grandmother      Asthma No family hx of        SOCIAL HISTORY:  Social History     Socioeconomic History     Marital status:      Spouse name: Marianna     Number of children: 2     Years of education: 16     Highest education level: None   Occupational History     Occupation: Program Leader     Comment: [employer]     Employer: [employer]    Tobacco Use     Smoking status: Former     Packs/day: 0.00     Years: 0.00     Pack years: 0.00     Types: Cigarettes     Smokeless tobacco: Never     Tobacco comments:     Quit 31+ years ago.   Substance and Sexual Activity     Alcohol use: Yes     Alcohol/week: 0.0 standard drinks     Comment: Socially      Drug use: No     Sexual activity: Yes     Partners: Male     Birth control/protection: None     Comment: hysterectomy   Other Topics Concern     Blood Transfusions No     Special Diet No     Exercise No     Seat Belt Yes     Parent/sibling w/ CABG, MI or angioplasty before 65F 55M? No   Social History Narrative    The patient was born in [facility] [facility]. She eats fruits and vegetables every day. She takes calcium supplement and vitamin D.        No advanced care directives on file           ROS:   Constitutional: No fever, chills, or sweats. No weight gain/loss   ENT: No visual disturbance, ear ache, epistaxis, sore throat  Allergies/Immunologic: Negative.   Respiratory: No cough, hemoptysia  Cardiovascular: As per HPI  GI: No nausea, vomiting, hematemesis, melena, or hematochezia  : No urinary frequency, dysuria, or hematuria  Integument: Negative  Psychiatric: Negative  Neuro: Negative  Endocrinology: Negative   Musculoskeletal: Negative    EXAM:  LMP 10/26/2014     Video visit.    Labs:  LIPID RESULTS:  Lab Results   Component Value  Date    CHOL 184 03/24/2023    CHOL 268 (H) 06/23/2021    HDL 49 (L) 03/24/2023    HDL 63 06/23/2021     (H) 03/24/2023     (H) 03/24/2023     (H) 06/23/2021    TRIG 73 03/24/2023    TRIG 119 06/23/2021    CHOLHDLRATIO 3.5 09/11/2015    NHDL 135 (H) 03/24/2023    NHDL 205 (H) 06/23/2021       LIVER ENZYME RESULTS:  Lab Results   Component Value Date    AST 17 03/24/2023    AST 20 05/20/2021    ALT 14 03/24/2023    ALT 29 05/20/2021       CBC RESULTS:  Lab Results   Component Value Date    WBC 7.2 05/20/2021    RBC 4.35 05/20/2021    HGB 13.6 05/20/2021    HCT 39.1 05/20/2021    MCV 90 05/20/2021    MCH 31.3 05/20/2021    MCHC 34.8 05/20/2021    RDW 12.3 05/20/2021     05/20/2021       BMP RESULTS:  Lab Results   Component Value Date     03/24/2023     05/20/2021    POTASSIUM 4.3 03/24/2023    POTASSIUM 4.3 08/11/2022    POTASSIUM 4.3 05/20/2021    CHLORIDE 103 03/24/2023    CHLORIDE 106 08/11/2022    CHLORIDE 106 05/20/2021    CO2 23 03/24/2023    CO2 24 08/11/2022    CO2 24 05/20/2021    ANIONGAP 13 03/24/2023    ANIONGAP 7 08/11/2022    ANIONGAP 6 05/20/2021    GLC 98 03/24/2023    GLC 85 08/11/2022    GLC 72 05/20/2021    BUN 18.2 03/24/2023    BUN 16 08/11/2022    BUN 10 05/20/2021    CR 0.68 03/24/2023    CR 0.60 05/20/2021    GFRESTIMATED >90 03/24/2023    GFRESTIMATED >90 05/20/2021    GFRESTBLACK >90 05/20/2021    BUZZ 8.8 03/24/2023    BUZZ 8.9 05/20/2021        A1C RESULTS:  Lab Results   Component Value Date    A1C 5.2 11/11/2020           Assessment and Plan:     We discussed the results with patient.  We discussed the importance of a heart healthy lifestyle and diet.       Medication Changes: Increase pravastatin according to schedule below:  -For 3 weeks: Take 20 mg on Mondays and Thursdays. Take 10 mg the other days.  -For the next 3 weeks: Alternate between 10 and 20 mg every other day.  -Then: Increase to 20 mg daily        Follow Up:   -Fasting labs in June or  July, with a virtual visit sometime after.        Follow the American Heart Association Diet and Lifestyle Recommendations:  -Limit saturated fat, trans fat, sodium, red meat, sweets and sugar-sweetened beverages. If you choose to eat red meat, compare labels and select the leanest cuts available.  -Aim for at least 150 minutes of moderate physical activity or 75 minutes of vigorous physical activity - or an equal combination of both - each week.      25 min were spent directly with patient during video visit.    David Sepulveda MD, PhD  Professor of Medicine  Division of Cardiology         CC  Patient Care Team:  Tim Hill MD as PCP - General (Family Practice)  Tim Hill MD as Assigned PCP  Shauna Arceo MD as Assigned OBGYN Provider  David Sepulveda MD as Assigned Heart and Vascular Provider  Gricel Laws RN as Specialty Care Coordinator (Cardiology)              Please do not hesitate to contact me if you have any questions/concerns.     Sincerely,     David Sepulveda MD

## 2023-06-20 ENCOUNTER — ANCILLARY PROCEDURE (OUTPATIENT)
Dept: MAMMOGRAPHY | Facility: CLINIC | Age: 53
End: 2023-06-20
Attending: OBSTETRICS & GYNECOLOGY
Payer: COMMERCIAL

## 2023-06-20 DIAGNOSIS — Z12.31 VISIT FOR SCREENING MAMMOGRAM: ICD-10-CM

## 2023-06-20 PROCEDURE — 77063 BREAST TOMOSYNTHESIS BI: CPT | Mod: TC | Performed by: RADIOLOGY

## 2023-06-20 PROCEDURE — 77067 SCR MAMMO BI INCL CAD: CPT | Mod: TC | Performed by: RADIOLOGY

## 2023-06-28 ENCOUNTER — LAB (OUTPATIENT)
Dept: LAB | Facility: CLINIC | Age: 53
End: 2023-06-28
Payer: COMMERCIAL

## 2023-06-28 DIAGNOSIS — E78.5 HYPERLIPIDEMIA LDL GOAL <100: ICD-10-CM

## 2023-06-28 LAB
ALBUMIN SERPL BCG-MCNC: 4.6 G/DL (ref 3.5–5.2)
ALP SERPL-CCNC: 47 U/L (ref 35–104)
ALT SERPL W P-5'-P-CCNC: 32 U/L (ref 0–50)
ANION GAP SERPL CALCULATED.3IONS-SCNC: 10 MMOL/L (ref 7–15)
AST SERPL W P-5'-P-CCNC: 25 U/L (ref 0–45)
BILIRUB SERPL-MCNC: 0.3 MG/DL
BUN SERPL-MCNC: 19.6 MG/DL (ref 6–20)
CALCIUM SERPL-MCNC: 9.6 MG/DL (ref 8.6–10)
CHLORIDE SERPL-SCNC: 102 MMOL/L (ref 98–107)
CHOLEST SERPL-MCNC: 206 MG/DL
CREAT SERPL-MCNC: 0.64 MG/DL (ref 0.51–0.95)
DEPRECATED HCO3 PLAS-SCNC: 24 MMOL/L (ref 22–29)
GFR SERPL CREATININE-BSD FRML MDRD: >90 ML/MIN/1.73M2
GLUCOSE SERPL-MCNC: 100 MG/DL (ref 70–99)
HDLC SERPL-MCNC: 58 MG/DL
LDLC SERPL CALC-MCNC: 135 MG/DL
LDLC SERPL DIRECT ASSAY-MCNC: 131 MG/DL
NONHDLC SERPL-MCNC: 148 MG/DL
POTASSIUM SERPL-SCNC: 4.4 MMOL/L (ref 3.4–5.3)
PROT SERPL-MCNC: 7.2 G/DL (ref 6.4–8.3)
SODIUM SERPL-SCNC: 136 MMOL/L (ref 136–145)
TRIGL SERPL-MCNC: 64 MG/DL

## 2023-06-28 PROCEDURE — 80053 COMPREHEN METABOLIC PANEL: CPT

## 2023-06-28 PROCEDURE — 83721 ASSAY OF BLOOD LIPOPROTEIN: CPT

## 2023-06-28 PROCEDURE — 80061 LIPID PANEL: CPT | Mod: 59

## 2023-06-28 PROCEDURE — 36415 COLL VENOUS BLD VENIPUNCTURE: CPT

## 2023-06-29 ENCOUNTER — VIRTUAL VISIT (OUTPATIENT)
Dept: CARDIOLOGY | Facility: CLINIC | Age: 53
End: 2023-06-29
Attending: INTERNAL MEDICINE
Payer: COMMERCIAL

## 2023-06-29 DIAGNOSIS — R73.03 PREDIABETES: ICD-10-CM

## 2023-06-29 DIAGNOSIS — E78.5 HYPERLIPIDEMIA LDL GOAL <100: ICD-10-CM

## 2023-06-29 DIAGNOSIS — E05.90 HYPERTHYROIDISM: Primary | ICD-10-CM

## 2023-06-29 PROCEDURE — 99214 OFFICE O/P EST MOD 30 MIN: CPT | Mod: VID | Performed by: INTERNAL MEDICINE

## 2023-06-29 NOTE — PROGRESS NOTES
irtual Visit Details  Patient is eligible for a video visit    Location  Patient: home  Dr Sepulveda: [facility] [place] [facility]    Time  Start: 6.30 pm  Stop:  7.007 pm      Video plaform: hSerif    HPI:    I had the privilege to evaluate and examine Ms Shital Parrish, who is a  52 yr female patient with heterozygeous hypercholesterolemia.  Patient denies chest pain, shortness of breath and intermittent claudication.  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  Patient has no palpitations    PAST MEDICAL HISTORY:  Past Medical History:   Diagnosis Date     Edema 10/24/2013     Gastro-oesophageal reflux disease      Heavy periods 10/24/2013     Hyperthyroidism 9/26/2014    borderline-treated with a med for 6 months in [place] and then numbers improved. had a low TSH here and referred to [facility]. numbers there were borderline but normal and no antibodies. rec. 12/14-I123 uptake was increased so Graves dz dx'd. may do methimazole if repeat TFTs show hyperthyroid     Leiomyoma of uterus, unspecified 9/26/2014     Nipple discharge 09/14    right side only. neg mammo and right breast u/s. MRI pending     PONV (postoperative nausea and vomiting)      Post-menopause on HRT (hormone replacement therapy) 6/26/2021     Pure hypercholesterolemia 6/26/2021     Varicose veins of lower extremities with other complications 10/24/2013       CURRENT MEDICATIONS:  Current Outpatient Medications   Medication Sig Dispense Refill     EPIDUO FORTE 0.3-2.5 % gel Apply 0.3 Pump topically as needed       estradiol (VIVELLE-DOT) 0.075 MG/24HR BIW patch Place 1 patch onto the skin twice a week 24 patch 3     fexofenadine (ALLEGRA) 180 MG tablet Take 180 mg by mouth daily       methimazole (TAPAZOLE) 5 MG tablet TAKE 1/2 TABLET BY MOUTH DAILY       pravastatin (PRAVACHOL) 20 MG tablet Take 1 tablet (20 mg) by mouth daily 90 tablet 3     cholecalciferol (VITAMIN D3)  125 mcg (5000 units) capsule  (Patient not taking: Reported on 2023)       ciclopirox (PENLAC) 8 % external solution Apply 8 Application topically as needed To nails nails (Patient not taking: Reported on 8/10/2022)       olopatadine (PATANOL) 0.1 % ophthalmic solution INSTILL 1 DROP INTO BOTH EYES TWICE A DAY (Patient not taking: Reported on 8/10/2022) 5 mL 11       PAST SURGICAL HISTORY:  Past Surgical History:   Procedure Laterality Date     APPENDECTOMY        SECTION       CHOLECYSTECTOMY, LAPOROSCOPIC  2012    Cholecystectomy, Laparoscopic     COLONOSCOPY N/A 2017    Procedure: COLONOSCOPY;  COLONOSCOPY;  Surgeon: Shannon Noel MD;  Location:  GI     COLONOSCOPY N/A 2019    Procedure: COLONOSCOPY;  Surgeon: Shannon Noel MD;  Location:  GI     HYSTERECTOMY SUPRACERVICAL N/A 2014    Procedure: HYSTERECTOMY SUPRACERVICAL;  Surgeon: Shauna Arceo MD;  [facility]. started as laparoscopic but converted to open due to adhesions     LAPAROSCOPIC CYSTECTOMY OVARIAN (ONCOLOGY)      right. benign cyst. partial oopherectomy in [facility]     LAPAROSCOPIC SALPINGO-OOPHORECTOMY  14     MYOMECTOMY UTERUS  2008    hysteroscopic     TONSILLECTOMY  1979       ALLERGIES     Allergies   Allergen Reactions     Penicillins      Seasonal Allergies      Amoxicillin Rash       FAMILY HISTORY:  Family History   Problem Relation Age of Onset     Diabetes Mother      Cancer Mother         melanoma     Colon Polyps Mother      Coronary Artery Disease Father      No Known Problems Sister      Diabetes Maternal Grandmother      Breast Cancer Maternal Aunt      Thyroid Disease Cousin      No Known Problems Brother      No Known Problems Maternal Grandfather      No Known Problems Paternal Grandmother      Asthma No family hx of        SOCIAL HISTORY:  Social History     Socioeconomic History     Marital status:      Spouse name:  Marianna     Number of children: 2     Years of education: 16     Highest education level: None   Occupational History     Occupation: Program Leader     Comment: [employer]     Employer: [employer]    Tobacco Use     Smoking status: Former     Packs/day: 0.00     Years: 0.00     Pack years: 0.00     Types: Cigarettes     Smokeless tobacco: Never     Tobacco comments:     Quit 31+ years ago.   Substance and Sexual Activity     Alcohol use: Yes     Alcohol/week: 0.0 standard drinks of alcohol     Comment: Socially      Drug use: No     Sexual activity: Yes     Partners: Male     Birth control/protection: None     Comment: hysterectomy   Other Topics Concern     Blood Transfusions No     Special Diet No     Exercise No     Seat Belt Yes     Parent/sibling w/ CABG, MI or angioplasty before 65F 55M? No   Social History Narrative    The patient was born in [place]. She eats fruits and vegetables every day. She takes calcium supplement and vitamin D.        No advanced care directives on file           ROS:   Constitutional: No fever, chills, or sweats. No weight gain/loss   ENT: No visual disturbance, ear ache, epistaxis, sore throat  Allergies/Immunologic: Negative.   Respiratory: No cough, hemoptysia  Cardiovascular: As per HPI  GI: No nausea, vomiting, hematemesis, melena, or hematochezia  : No urinary frequency, dysuria, or hematuria  Integument: Negative  Psychiatric: Negative  Neuro: Negative  Endocrinology: Negative   Musculoskeletal: Negative    EXAM:    Virtual  visit    Labs:  LIPID RESULTS:  Lab Results   Component Value Date    CHOL 206 (H) 06/28/2023    CHOL 268 (H) 06/23/2021    HDL 58 06/28/2023    HDL 63 06/23/2021     (H) 06/28/2023     (H) 06/28/2023     (H) 06/23/2021    TRIG 64 06/28/2023    TRIG 119 06/23/2021    CHOLHDLRATIO 3.5 09/11/2015    NHDL 148 (H) 06/28/2023    NHDL 205 (H) 06/23/2021       LIVER ENZYME RESULTS:  Lab Results   Component Value Date    AST 25  06/28/2023    AST 20 05/20/2021    ALT 32 06/28/2023    ALT 29 05/20/2021       CBC RESULTS:  Lab Results   Component Value Date    WBC 7.2 05/20/2021    RBC 4.35 05/20/2021    HGB 13.6 05/20/2021    HCT 39.1 05/20/2021    MCV 90 05/20/2021    MCH 31.3 05/20/2021    MCHC 34.8 05/20/2021    RDW 12.3 05/20/2021     05/20/2021       BMP RESULTS:  Lab Results   Component Value Date     06/28/2023     05/20/2021    POTASSIUM 4.4 06/28/2023    POTASSIUM 4.3 08/11/2022    POTASSIUM 4.3 05/20/2021    CHLORIDE 102 06/28/2023    CHLORIDE 106 08/11/2022    CHLORIDE 106 05/20/2021    CO2 24 06/28/2023    CO2 24 08/11/2022    CO2 24 05/20/2021    ANIONGAP 10 06/28/2023    ANIONGAP 7 08/11/2022    ANIONGAP 6 05/20/2021     (H) 06/28/2023    GLC 85 08/11/2022    GLC 72 05/20/2021    BUN 19.6 06/28/2023    BUN 16 08/11/2022    BUN 10 05/20/2021    CR 0.64 06/28/2023    CR 0.60 05/20/2021    GFRESTIMATED >90 06/28/2023    GFRESTIMATED >90 05/20/2021    GFRESTBLACK >90 05/20/2021    BUZZ 9.6 06/28/2023    BUZZ 8.9 05/20/2021        A1C RESULTS:  Lab Results   Component Value Date    A1C 5.2 11/11/2020       Assessment and Plan:     I discussed the results with patient.  I discussed the importance of a heart healthy lifestyle and diet as well reducing the carbohydrates.    I discussed following items:    Medication Changes: None     Follow Up: Endocrinology referral for prediiabetes     Follow the American Heart Association Diet and Lifestyle Recommendations:  -Limit saturated fat, trans fat, sodium, red meat, sweets and sugar-sweetened beverages. If you choose to eat red meat, compare labels and select the leanest cuts available.  -Aim for at least 150 minutes of moderate physical activity or 75 minutes of vigorous physical activity - or an equal combination of both - each week.      David Sepulveda MD, PhD  Professor of Medicine  Division of Cardiology    CC  Patient Care Team:  Tim Hill MD as PCP -  General (Family Practice)  Tim Hill MD as Assigned PCP  Shauna Arceo MD as Assigned OBGYN Provider  David Sepulveda MD as Assigned Heart and Vascular Provider  Gricel Laws RN as Specialty Care Coordinator (Cardiology)  Carlos Alberto Alejo MD as DAVID MARAVILLA

## 2023-06-29 NOTE — LETTER
6/29/2023      RE: Shital Parrish  [address]  [place] [address]       Dear Colleague,    Thank you for the opportunity to participate in the care of your patient, Shital Parrish, at the [facility] [place] at [facility]. Please see a copy of my visit note below.        Location  Patient: home  Dr Sepulveda: [facility]    Time  Start: 6.30 pm  Stop:  7.007 pm      Video plaform: Sherif    HPI:    I had the privilege to evaluate and examine Ms Shital Parrish, who is a  52 yr female patient with heterozygeous hypercholesterolemia.  Patient denies chest pain, shortness of breath and intermittent claudication.  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  Patient has no palpitations    PAST MEDICAL HISTORY:  Past Medical History:   Diagnosis Date    Edema 10/24/2013    Gastro-oesophageal reflux disease     Heavy periods 10/24/2013    Hyperthyroidism 9/26/2014    borderline-treated with a med for 6 months in [place] and then numbers improved. had a low TSH here and referred to [facility]. numbers there were borderline but normal and no antibodies. rec. 12/14-I123 uptake was increased so Graves dz dx'd. may do methimazole if repeat TFTs show hyperthyroid    Leiomyoma of uterus, unspecified 9/26/2014    Nipple discharge 09/14    right side only. neg mammo and right breast u/s. MRI pending    PONV (postoperative nausea and vomiting)     Post-menopause on HRT (hormone replacement therapy) 6/26/2021    Pure hypercholesterolemia 6/26/2021    Varicose veins of lower extremities with other complications 10/24/2013       CURRENT MEDICATIONS:  Current Outpatient Medications   Medication Sig Dispense Refill    EPIDUO FORTE 0.3-2.5 % gel Apply 0.3 Pump topically as needed      estradiol (VIVELLE-DOT) 0.075 MG/24HR BIW patch Place 1 patch onto the skin twice a week 24 patch 3     fexofenadine (ALLEGRA) 180 MG tablet Take 180 mg by mouth daily      methimazole (TAPAZOLE) 5 MG tablet TAKE 1/2 TABLET BY MOUTH DAILY      pravastatin (PRAVACHOL) 20 MG tablet Take 1 tablet (20 mg) by mouth daily 90 tablet 3    cholecalciferol (VITAMIN D3) 125 mcg (5000 units) capsule  (Patient not taking: Reported on 2023)      ciclopirox (PENLAC) 8 % external solution Apply 8 Application topically as needed To nails nails (Patient not taking: Reported on 8/10/2022)      olopatadine (PATANOL) 0.1 % ophthalmic solution INSTILL 1 DROP INTO BOTH EYES TWICE A DAY (Patient not taking: Reported on 8/10/2022) 5 mL 11       PAST SURGICAL HISTORY:  Past Surgical History:   Procedure Laterality Date    APPENDECTOMY       SECTION      CHOLECYSTECTOMY, LAPOROSCOPIC  2012    Cholecystectomy, Laparoscopic    COLONOSCOPY N/A 2017    Procedure: COLONOSCOPY;  COLONOSCOPY;  Surgeon: Shannon Noel MD;  Location:  GI    COLONOSCOPY N/A 2019    Procedure: COLONOSCOPY;  Surgeon: Shannon Noel MD;  Location:  GI    HYSTERECTOMY SUPRACERVICAL N/A 2014    Procedure: HYSTERECTOMY SUPRACERVICAL;  Surgeon: Shauna Arceo MD;  Location:  OR [facility] and LSO done by Adis/ Leora. started as laparoscopic but converted to open due to adhesions    LAPAROSCOPIC CYSTECTOMY OVARIAN (ONCOLOGY)      right. benign cyst. partial oopherectomy in [facility]    LAPAROSCOPIC SALPINGO-OOPHORECTOMY  14    MYOMECTOMY UTERUS      hysteroscopic    TONSILLECTOMY  1979       ALLERGIES     Allergies   Allergen Reactions    Penicillins     Seasonal Allergies     Amoxicillin Rash       FAMILY HISTORY:  Family History   Problem Relation Age of Onset    Diabetes Mother     Cancer Mother         melanoma    Colon Polyps Mother     Coronary Artery Disease Father     No Known Problems Sister     Diabetes Maternal Grandmother     Breast Cancer Maternal Aunt     Thyroid Disease Cousin     No Known Problems  Brother     No Known Problems Maternal Grandfather     No Known Problems Paternal Grandmother     Asthma No family hx of        SOCIAL HISTORY:  Social History     Socioeconomic History    Marital status:      Spouse name: Marianna    Number of children: 2    Years of education: 16    Highest education level: None   Occupational History    Occupation: Program Leader     Comment: [employer]     Employer: [employer]    Tobacco Use    Smoking status: Former     Packs/day: 0.00     Years: 0.00     Pack years: 0.00     Types: Cigarettes    Smokeless tobacco: Never    Tobacco comments:     Quit 31+ years ago.   Substance and Sexual Activity    Alcohol use: Yes     Alcohol/week: 0.0 standard drinks of alcohol     Comment: Socially     Drug use: No    Sexual activity: Yes     Partners: Male     Birth control/protection: None     Comment: hysterectomy   Other Topics Concern    Blood Transfusions No    Special Diet No    Exercise No    Seat Belt Yes    Parent/sibling w/ CABG, MI or angioplasty before 65F 55M? No   Social History Narrative    The patient was born in [facility]. She eats fruits and vegetables every day. She takes calcium supplement and vitamin D.        No advanced care directives on file           ROS:   Constitutional: No fever, chills, or sweats. No weight gain/loss   ENT: No visual disturbance, ear ache, epistaxis, sore throat  Allergies/Immunologic: Negative.   Respiratory: No cough, hemoptysia  Cardiovascular: As per HPI  GI: No nausea, vomiting, hematemesis, melena, or hematochezia  : No urinary frequency, dysuria, or hematuria  Integument: Negative  Psychiatric: Negative  Neuro: Negative  Endocrinology: Negative   Musculoskeletal: Negative    EXAM:    Virtual  visit    Labs:  LIPID RESULTS:  Lab Results   Component Value Date    CHOL 206 (H) 06/28/2023    CHOL 268 (H) 06/23/2021    HDL 58 06/28/2023    HDL 63 06/23/2021     (H) 06/28/2023     (H) 06/28/2023     (H)  06/23/2021    TRIG 64 06/28/2023    TRIG 119 06/23/2021    CHOLHDLRATIO 3.5 09/11/2015    NHDL 148 (H) 06/28/2023    NHDL 205 (H) 06/23/2021       LIVER ENZYME RESULTS:  Lab Results   Component Value Date    AST 25 06/28/2023    AST 20 05/20/2021    ALT 32 06/28/2023    ALT 29 05/20/2021       CBC RESULTS:  Lab Results   Component Value Date    WBC 7.2 05/20/2021    RBC 4.35 05/20/2021    HGB 13.6 05/20/2021    HCT 39.1 05/20/2021    MCV 90 05/20/2021    MCH 31.3 05/20/2021    MCHC 34.8 05/20/2021    RDW 12.3 05/20/2021     05/20/2021       BMP RESULTS:  Lab Results   Component Value Date     06/28/2023     05/20/2021    POTASSIUM 4.4 06/28/2023    POTASSIUM 4.3 08/11/2022    POTASSIUM 4.3 05/20/2021    CHLORIDE 102 06/28/2023    CHLORIDE 106 08/11/2022    CHLORIDE 106 05/20/2021    CO2 24 06/28/2023    CO2 24 08/11/2022    CO2 24 05/20/2021    ANIONGAP 10 06/28/2023    ANIONGAP 7 08/11/2022    ANIONGAP 6 05/20/2021     (H) 06/28/2023    GLC 85 08/11/2022    GLC 72 05/20/2021    BUN 19.6 06/28/2023    BUN 16 08/11/2022    BUN 10 05/20/2021    CR 0.64 06/28/2023    CR 0.60 05/20/2021    GFRESTIMATED >90 06/28/2023    GFRESTIMATED >90 05/20/2021    GFRESTBLACK >90 05/20/2021    BUZZ 9.6 06/28/2023    BUZZ 8.9 05/20/2021        A1C RESULTS:  Lab Results   Component Value Date    A1C 5.2 11/11/2020       Assessment and Plan:     I discussed the results with patient.  I discussed the importance of a heart healthy lifestyle and diet as well reducing the carbohydrates.    I discussed following items:    Medication Changes: None     Follow Up: Endocrinology referral for prediiabetes     Follow the American Heart Association Diet and Lifestyle Recommendations:  -Limit saturated fat, trans fat, sodium, red meat, sweets and sugar-sweetened beverages. If you choose to eat red meat, compare labels and select the leanest cuts available.  -Aim for at least 150 minutes of moderate physical activity or 75  minutes of vigorous physical activity - or an equal combination of both - each week.      David Sepulveda MD, PhD  Professor of Medicine  Division of Cardiology    CC  Patient Care Team:  Tim Hill MD as PCP - General (Family Practice)

## 2023-06-29 NOTE — PATIENT INSTRUCTIONS
June 29, 2023    Cardiology Provider You Saw During Your Visit: Dr. Sepulveda      Medication Changes: None      Follow Up: Endocrinology referral        Follow the American Heart Association Diet and Lifestyle Recommendations:  -Limit saturated fat, trans fat, sodium, red meat, sweets and sugar-sweetened beverages. If you choose to eat red meat, compare labels and select the leanest cuts available.  -Aim for at least 150 minutes of moderate physical activity or 75 minutes of vigorous physical activity - or an equal combination of both - each week.      To Reach Us:  -During business hours: [phone], press option # 1 to schedule an appointment or to leave a message for your care team.     -After hours, weekends or holidays: [phone], press option #4 and ask to speak to the on-call cardiologist. Inform them you are a patient at the [facility].        **If you have a cardiac device, please make sure you schedule an in-person device check just prior to your cardiology provider appointments**        Gricel Laws RN  Cardiology Care Coordinator - General Cardiology  [facility]

## 2023-06-29 NOTE — NURSING NOTE
Chief Complaint   Patient presents with     Follow Up     3 month follow up, discuss recent test results per pt. Medications/allergies reviewed with pt, no changes per pt.        Is the patient currently in the state of [place]? YES    Visit mode:VIDEO    If the visit is dropped, the patient can be reconnected by: VIDEO VISIT: Pt requests phone call at [phone] instead if interrupted.    Will anyone else be joining the visit? NO      How would you like to obtain your AVS? MyChart    Are changes needed to the allergy or medication list? NO    Patient denies any changes since echeck-in regarding medication and allergies and states all information entered during echeck-in remains accurate.    Leonel Gold, Visit Facilitator/MA.

## 2023-07-17 ASSESSMENT — ENCOUNTER SYMPTOMS
ARTHRALGIAS: 0
ABDOMINAL PAIN: 0
HEMATURIA: 0
PALPITATIONS: 0
CHILLS: 0
HEMATOCHEZIA: 0
DYSURIA: 0
CONSTIPATION: 0
FREQUENCY: 0
JOINT SWELLING: 0
SORE THROAT: 0
HEADACHES: 0
HEARTBURN: 1
MYALGIAS: 0
NAUSEA: 0
WEAKNESS: 0
DIZZINESS: 0
COUGH: 0
NERVOUS/ANXIOUS: 0
DIARRHEA: 0
FEVER: 0
EYE PAIN: 0
BREAST MASS: 0
SHORTNESS OF BREATH: 0
PARESTHESIAS: 0

## 2023-07-19 ENCOUNTER — OFFICE VISIT (OUTPATIENT)
Dept: FAMILY MEDICINE | Facility: CLINIC | Age: 53
End: 2023-07-19
Payer: COMMERCIAL

## 2023-07-19 VITALS
TEMPERATURE: 97.7 F | SYSTOLIC BLOOD PRESSURE: 114 MMHG | HEIGHT: 64 IN | DIASTOLIC BLOOD PRESSURE: 74 MMHG | OXYGEN SATURATION: 98 % | WEIGHT: 187.8 LBS | BODY MASS INDEX: 32.06 KG/M2 | HEART RATE: 80 BPM | RESPIRATION RATE: 15 BRPM

## 2023-07-19 DIAGNOSIS — Z00.00 HEALTH MAINTENANCE EXAMINATION: Primary | ICD-10-CM

## 2023-07-19 DIAGNOSIS — R73.09 ELEVATED GLUCOSE: ICD-10-CM

## 2023-07-19 DIAGNOSIS — E05.00 GRAVES DISEASE: ICD-10-CM

## 2023-07-19 LAB
ALBUMIN SERPL BCG-MCNC: 4.5 G/DL (ref 3.5–5.2)
ALP SERPL-CCNC: 47 U/L (ref 35–104)
ALT SERPL W P-5'-P-CCNC: 30 U/L (ref 0–50)
ANION GAP SERPL CALCULATED.3IONS-SCNC: 11 MMOL/L (ref 7–15)
AST SERPL W P-5'-P-CCNC: 25 U/L (ref 0–45)
BILIRUB SERPL-MCNC: 0.4 MG/DL
BUN SERPL-MCNC: 10.5 MG/DL (ref 6–20)
CALCIUM SERPL-MCNC: 9.2 MG/DL (ref 8.6–10)
CHLORIDE SERPL-SCNC: 103 MMOL/L (ref 98–107)
CHOLEST SERPL-MCNC: 207 MG/DL
CREAT SERPL-MCNC: 0.63 MG/DL (ref 0.51–0.95)
DEPRECATED HCO3 PLAS-SCNC: 25 MMOL/L (ref 22–29)
ERYTHROCYTE [DISTWIDTH] IN BLOOD BY AUTOMATED COUNT: 11.7 % (ref 10–15)
GFR SERPL CREATININE-BSD FRML MDRD: >90 ML/MIN/1.73M2
GLUCOSE SERPL-MCNC: 95 MG/DL (ref 70–99)
HBA1C MFR BLD: 5.2 % (ref 0–5.6)
HCT VFR BLD AUTO: 40.5 % (ref 35–47)
HDLC SERPL-MCNC: 56 MG/DL
HGB BLD-MCNC: 14 G/DL (ref 11.7–15.7)
LDLC SERPL CALC-MCNC: 132 MG/DL
MCH RBC QN AUTO: 30.8 PG (ref 26.5–33)
MCHC RBC AUTO-ENTMCNC: 34.6 G/DL (ref 31.5–36.5)
MCV RBC AUTO: 89 FL (ref 78–100)
NONHDLC SERPL-MCNC: 151 MG/DL
PLATELET # BLD AUTO: 303 10E3/UL (ref 150–450)
POTASSIUM SERPL-SCNC: 4.2 MMOL/L (ref 3.4–5.3)
PROT SERPL-MCNC: 7.2 G/DL (ref 6.4–8.3)
RBC # BLD AUTO: 4.55 10E6/UL (ref 3.8–5.2)
SODIUM SERPL-SCNC: 139 MMOL/L (ref 136–145)
T3FREE SERPL-MCNC: 3.3 PG/ML (ref 2–4.4)
T4 FREE SERPL-MCNC: 1.55 NG/DL (ref 0.9–1.7)
TRIGL SERPL-MCNC: 97 MG/DL
TSH SERPL DL<=0.005 MIU/L-ACNC: 0.76 UIU/ML (ref 0.3–4.2)
WBC # BLD AUTO: 6.1 10E3/UL (ref 4–11)

## 2023-07-19 PROCEDURE — 84481 FREE ASSAY (FT-3): CPT | Performed by: FAMILY MEDICINE

## 2023-07-19 PROCEDURE — 86038 ANTINUCLEAR ANTIBODIES: CPT | Performed by: FAMILY MEDICINE

## 2023-07-19 PROCEDURE — 99213 OFFICE O/P EST LOW 20 MIN: CPT | Mod: 25 | Performed by: FAMILY MEDICINE

## 2023-07-19 PROCEDURE — 36415 COLL VENOUS BLD VENIPUNCTURE: CPT | Performed by: FAMILY MEDICINE

## 2023-07-19 PROCEDURE — 80061 LIPID PANEL: CPT | Performed by: FAMILY MEDICINE

## 2023-07-19 PROCEDURE — 84443 ASSAY THYROID STIM HORMONE: CPT | Performed by: FAMILY MEDICINE

## 2023-07-19 PROCEDURE — 99396 PREV VISIT EST AGE 40-64: CPT | Performed by: FAMILY MEDICINE

## 2023-07-19 PROCEDURE — 80053 COMPREHEN METABOLIC PANEL: CPT | Performed by: FAMILY MEDICINE

## 2023-07-19 PROCEDURE — 85027 COMPLETE CBC AUTOMATED: CPT | Performed by: FAMILY MEDICINE

## 2023-07-19 PROCEDURE — 83036 HEMOGLOBIN GLYCOSYLATED A1C: CPT | Performed by: FAMILY MEDICINE

## 2023-07-19 PROCEDURE — 84439 ASSAY OF FREE THYROXINE: CPT | Performed by: FAMILY MEDICINE

## 2023-07-19 ASSESSMENT — ENCOUNTER SYMPTOMS
EYE PAIN: 0
HEMATURIA: 0
WEAKNESS: 0
DIZZINESS: 0
FREQUENCY: 0
HEMATOCHEZIA: 0
PARESTHESIAS: 0
DYSURIA: 0
SORE THROAT: 0
HEARTBURN: 1
PALPITATIONS: 0
CHILLS: 0
FEVER: 0
BREAST MASS: 0
NAUSEA: 0
HEADACHES: 0
SHORTNESS OF BREATH: 0
NERVOUS/ANXIOUS: 0
DIARRHEA: 0
ABDOMINAL PAIN: 0
CONSTIPATION: 0
MYALGIAS: 0
ARTHRALGIAS: 0
JOINT SWELLING: 0
COUGH: 0

## 2023-07-19 ASSESSMENT — PAIN SCALES - GENERAL: PAINLEVEL: NO PAIN (0)

## 2023-07-19 NOTE — PROGRESS NOTES
SUBJECTIVE:   CC: Shital is an 52 year old who presents for preventive health visit.       7/19/2023    12:47 PM   Additional Questions   Roomed by Ruthie     Healthy Habits:     Getting at least 3 servings of Calcium per day:  Yes    Bi-annual eye exam:  Yes    Dental care twice a year:  Yes    Sleep apnea or symptoms of sleep apnea:  None    Diet:  Regular (no restrictions)    Frequency of exercise:  6-7 days/week    Duration of exercise:  30-45 minutes    Taking medications regularly:  Not Applicable    Medication side effects:  Not applicable    Additional concerns today:  No    Have you ever done Advance Care Planning? (For example, a Health Directive, POLST, or a discussion with a medical provider or your loved ones about your wishes): No, advance care planning information given to patient to review.  Patient declined advance care planning discussion at this time.    Social History     Tobacco Use     Smoking status: Former     Packs/day: 0.00     Years: 0.00     Pack years: 0.00     Types: Cigarettes     Smokeless tobacco: Never     Tobacco comments:     Quit 31+ years ago.   Substance Use Topics     Alcohol use: Yes     Comment: Social Drinking             7/17/2023     3:12 PM   Alcohol Use   Prescreen: >3 drinks/day or >7 drinks/week? No          No data to display              Reviewed orders with patient.  Reviewed health maintenance and updated orders accordingly - Yes  BP Readings from Last 3 Encounters:   07/19/23 114/74   08/11/22 112/78   08/10/22 114/68    Wt Readings from Last 3 Encounters:   07/19/23 85.2 kg (187 lb 12.8 oz)   08/11/22 81.2 kg (179 lb)   08/10/22 81.6 kg (180 lb)                  Patient Active Problem List   Diagnosis     Hyperthyroidism     S/P abdominal supracervical subtotal hysterectomy     Elevated fasting glucose     Pure hypercholesterolemia     Post-menopause on HRT (hormone replacement therapy)     Symptoms, such as flushing, sleeplessness, headache, lack of  concentration, associated with the menopause     Past Surgical History:   Procedure Laterality Date     APPENDECTOMY        SECTION       CHOLECYSTECTOMY, LAPOROSCOPIC  2012    Cholecystectomy, Laparoscopic     COLONOSCOPY N/A 2017    Procedure: COLONOSCOPY;  COLONOSCOPY;  Surgeon: Shannon Noel MD;  Location:  GI     COLONOSCOPY N/A 2019    Procedure: COLONOSCOPY;  Surgeon: Shannon Noel MD;  Location:  GI     HYSTERECTOMY SUPRACERVICAL N/A 2014    Procedure: HYSTERECTOMY SUPRACERVICAL;  Surgeon: Shauna Arceo MD;  Location:  OR [facility] and LSO done by Adis/ Leora. started as laparoscopic but converted to open due to adhesions     LAPAROSCOPIC CYSTECTOMY OVARIAN (ONCOLOGY)      right. benign cyst. partial oopherectomy in [facility]     LAPAROSCOPIC SALPINGO-OOPHORECTOMY  14     MYOMECTOMY UTERUS      hysteroscopic     TONSILLECTOMY         Social History     Tobacco Use     Smoking status: Former     Packs/day: 0.00     Years: 0.00     Pack years: 0.00     Types: Cigarettes     Smokeless tobacco: Never     Tobacco comments:     Quit 31+ years ago.   Substance Use Topics     Alcohol use: Yes     Comment: Social Drinking     Family History   Problem Relation Age of Onset     Diabetes Mother      Cancer Mother         melanoma     Colon Polyps Mother      Coronary Artery Disease Father      No Known Problems Sister      Diabetes Maternal Grandmother      Breast Cancer Maternal Aunt      Thyroid Disease Cousin      No Known Problems Brother      No Known Problems Maternal Grandfather      No Known Problems Paternal Grandmother      Breast Cancer Other         Maternal Sister     Asthma No family hx of          Current Outpatient Medications   Medication Sig Dispense Refill     EPIDUO FORTE 0.3-2.5 % gel Apply 0.3 Pump topically as needed       estradiol (VIVELLE-DOT) 0.075 MG/24HR BIW patch Place 1 patch onto the skin twice a week 24 patch 3      fexofenadine (ALLEGRA) 180 MG tablet Take 180 mg by mouth daily       methimazole (TAPAZOLE) 5 MG tablet TAKE 1/2 TABLET BY MOUTH DAILY       pravastatin (PRAVACHOL) 20 MG tablet Take 1 tablet (20 mg) by mouth daily 90 tablet 3     cholecalciferol (VITAMIN D3) 125 mcg (5000 units) capsule  (Patient not taking: Reported on 1/2/2023)       ciclopirox (PENLAC) 8 % external solution Apply 8 Application topically as needed To nails nails (Patient not taking: Reported on 8/10/2022)       olopatadine (PATANOL) 0.1 % ophthalmic solution INSTILL 1 DROP INTO BOTH EYES TWICE A DAY (Patient not taking: Reported on 8/10/2022) 5 mL 11     Allergies   Allergen Reactions     Penicillins      Seasonal Allergies      Amoxicillin Rash     Recent Labs   Lab Test 06/28/23  1033 03/24/23  0813 12/19/22  1303 10/17/22  1056 08/11/22  1458 06/23/21  0915 05/20/21  1615 12/14/20  0811 11/11/20  0000 09/25/20  0805 10/17/19  0000 11/09/18  1212 07/02/18  0000   A1C  --   --   --   --   --   --   --   --  5.2  --  5.1  --  5.1   *  131* 120*  118* 177*  160*  --  159*  165*   < >  --    < >  --  205*  --    < >  --    HDL 58 49* 52  --  60   < >  --    < >  --  57  --    < >  --    TRIG 64 73 92  --  67   < >  --    < >  --  89  --    < >  --    ALT 32 14  --   --  36   < > 29   < >  --  24  --    < >  --    CR 0.64 0.68  --   --  0.58   < > 0.60  --   --  0.67  --    < >  --    GFRESTIMATED >90 >90  --   --  >90   < > >90  --   --  >90  --    < >  --    GFRESTBLACK  --   --   --   --   --   --  >90  --   --  >90  --    < >  --    POTASSIUM 4.4 4.3  --   --  4.3   < > 4.3  --   --  4.3  --    < >  --    TSH  --   --   --  0.57 0.14*  --  0.84  --  0.90  --  0.68   < > 1.88    < > = values in this interval not displayed.        Breast Cancer Screening:    FHS-7:       5/27/2022    12:58 PM 8/4/2022     9:43 AM 6/20/2023    12:00 PM 6/20/2023    12:01 PM 7/17/2023     3:13 PM   Breast CA Risk Assessment (FHS-7)   Did any of your  first-degree relatives have breast or ovarian cancer? No Yes No No Yes   Did any of your relatives have bilateral breast cancer? Unknown Yes No Unknown Yes   Did any man in your family have breast cancer? No No No No No   Did any woman in your family have breast and ovarian cancer? No Yes No No Yes   Did any woman in your family have breast cancer before age 50 y? No Yes No No Yes   Do you have 2 or more relatives with breast and/or ovarian cancer? No No No No No   Do you have 2 or more relatives with breast and/or bowel cancer? No No No No No     click delete button to remove this line now  Mammogram Screening: Recommended annual mammography  Pertinent mammograms are reviewed under the imaging tab.    History of abnormal Pap smear: NO - age 30-65 PAP every 5 years with negative HPV co-testing recommended      Latest Ref Rng & Units 7/15/2019    12:43 PM 7/15/2019    12:00 PM 10/23/2012     9:31 AM   PAP / HPV   PAP (Historical)  NIL   NIL    HPV 16 DNA NEG^Negative  Negative     HPV 18 DNA NEG^Negative  Negative     Other HR HPV NEG^Negative  Negative       Reviewed and updated as needed this visit by clinical staff    Allergies  Meds              Reviewed and updated as needed this visit by Provider                 Past Medical History:   Diagnosis Date     Edema 10/24/2013     Gastro-oesophageal reflux disease      Heavy periods 10/24/2013     Hyperthyroidism 9/26/2014    borderline-treated with a med for 6 months in [facility] and then numbers improved. had a low TSH here and referred to [facility]. numbers there were borderline but normal and no antibodies. rec. 12/14-I123 uptake was increased so Graves dz dx'd. may do methimazole if repeat TFTs show hyperthyroid     Leiomyoma of uterus, unspecified 9/26/2014     Nipple discharge 09/14    right side only. neg mammo and right breast u/s. MRI pending     PONV (postoperative nausea and vomiting)      Post-menopause on HRT (hormone replacement therapy) 6/26/2021     Pure  hypercholesterolemia 2021     Varicose veins of lower extremities with other complications 10/24/2013      Past Surgical History:   Procedure Laterality Date     APPENDECTOMY        SECTION       CHOLECYSTECTOMY, LAPOROSCOPIC  2012    Cholecystectomy, Laparoscopic     COLONOSCOPY N/A 2017    Procedure: COLONOSCOPY;  COLONOSCOPY;  Surgeon: Shannon Noel MD;  Location:  GI     COLONOSCOPY N/A 2019    Procedure: COLONOSCOPY;  Surgeon: Shannon Noel MD;  Location:  GI     HYSTERECTOMY SUPRACERVICAL N/A 2014    Procedure: HYSTERECTOMY SUPRACERVICAL;  Surgeon: Shauna Arceo MD;  [facility]. started as laparoscopic but converted to open due to adhesions     LAPAROSCOPIC CYSTECTOMY OVARIAN (ONCOLOGY)      right. benign cyst. partial oopherectomy in [facility]     LAPAROSCOPIC SALPINGO-OOPHORECTOMY  14     MYOMECTOMY UTERUS      hysteroscopic     TONSILLECTOMY       OB History    Para Term  AB Living   6 2 2 0 4 2   SAB IAB Ectopic Multiple Live Births   2 2 0 0 2      # Outcome Date GA Lbr Rajesh/2nd Weight Sex Delivery Anes PTL Lv   6 Term 10/01/98    F CS-LTranv   ARASELI      Name: Myrto   5 Term 96    F CS-LTranv   [facility]ASELI      Name: Nefeli   4 IAB            3 IAB            2 SAB            1 SAB                Review of Systems   Constitutional: Negative for chills and fever.   HENT: Negative for congestion, ear pain, hearing loss and sore throat.    Eyes: Negative for pain and visual disturbance.   Respiratory: Negative for cough and shortness of breath.    Cardiovascular: Negative for chest pain, palpitations and peripheral edema.   Gastrointestinal: Positive for heartburn. Negative for abdominal pain, constipation, diarrhea, hematochezia and nausea.   Breasts:  Negative for tenderness, breast mass and discharge.   Genitourinary: Negative for dysuria, frequency, genital sores, hematuria, pelvic  "pain, urgency, vaginal bleeding and vaginal discharge.   Musculoskeletal: Negative for arthralgias, joint swelling and myalgias.   Skin: Negative for rash.   Neurological: Negative for dizziness, weakness, headaches and paresthesias.   Psychiatric/Behavioral: Negative for mood changes. The patient is not nervous/anxious.           OBJECTIVE:   /74   Pulse 80   Temp 97.7  F (36.5  C) (Temporal)   Resp 15   Ht 1.613 m (5' 3.5\")   Wt 85.2 kg (187 lb 12.8 oz)   LMP 10/26/2014   SpO2 98%   BMI 32.75 kg/m    Physical Exam  GENERAL: healthy, alert and no distress  EYES: Eyes grossly normal to inspection, PERRL and conjunctivae and sclerae normal  HENT: ear canals and TM's normal, nose and mouth without ulcers or lesions  NECK: no adenopathy, no asymmetry, masses, or scars and thyroid normal to palpation  RESP: lungs clear to auscultation - no rales, rhonchi or wheezes  CV: regular rate and rhythm, normal S1 S2, no S3 or S4, no murmur, click or rub, no peripheral edema and peripheral pulses strong  ABDOMEN: soft, nontender, no hepatosplenomegaly, no masses and bowel sounds normal  MS: no gross musculoskeletal defects noted, no edema  SKIN: no suspicious lesions or rashes  NEURO: Normal strength and tone, mentation intact and speech normal        ASSESSMENT/PLAN:   (Z00.00) Health maintenance examination  (primary encounter diagnosis)  Plan: CBC with platelets, Comprehensive metabolic         panel (BMP + Alb, Alk Phos, ALT, AST, Total.         Bili, TP), Lipid panel reflex to direct LDL         Fasting, TSH, T3, Free, T4, free, Hemoglobin         A1c            (E05.00) Graves disease  Comment: has been stable, seeing endo, need to recheck   Plan: TSH, T3, Free, T4, free, Anti Nuclear Linda IgG         by IFA with Reflex            (R73.09) Elevated glucose  Comment: has been fluctuating with elevated lipid, will have her to check A1C  Plan: Hemoglobin A1c, Anti Nuclear Linda IgG by IFA         with Reflex        " "      Patient has been advised of split billing requirements and indicates understanding: Yes      COUNSELING:  Reviewed preventive health counseling, as reflected in patient instructions      BMI:   Estimated body mass index is 31.21 kg/m  as calculated from the following:    Height as of 8/11/22: 1.613 m (5' 3.5\").    Weight as of 8/11/22: 81.2 kg (179 lb).   Weight management plan: Discussed healthy diet and exercise guidelines      She reports that she has quit smoking. Her smoking use included cigarettes. She has never used smokeless tobacco.          Tim Hill MD  [facility]  "

## 2023-07-20 LAB — ANA SER QL IF: NEGATIVE

## 2023-07-23 DIAGNOSIS — N95.1 SYMPTOMS, SUCH AS FLUSHING, SLEEPLESSNESS, HEADACHE, LACK OF CONCENTRATION, ASSOCIATED WITH THE MENOPAUSE: ICD-10-CM

## 2023-07-23 DIAGNOSIS — Z79.890 POST-MENOPAUSE ON HRT (HORMONE REPLACEMENT THERAPY): ICD-10-CM

## 2023-07-24 RX ORDER — ESTRADIOL 0.07 MG/D
FILM, EXTENDED RELEASE TRANSDERMAL
Qty: 8 PATCH | Refills: 2 | Status: SHIPPED | OUTPATIENT
Start: 2023-07-24 | End: 2023-08-20

## 2023-07-24 NOTE — TELEPHONE ENCOUNTER
"Requested Prescriptions   Pending Prescriptions Disp Refills    estradiol (VIVELLE-DOT) 0.075 MG/24HR BIW patch [Pharmacy Med Name: ESTRADIOL 0.075 MG PATCH(2/WK)] 8 patch 20     Sig: APPLY 1 PATCH TWICE A WEEK       Hormone Replacement Therapy Passed - 7/23/2023  1:12 AM        Passed - Blood pressure under 140/90 in past 12 months     BP Readings from Last 3 Encounters:   07/19/23 114/74   08/11/22 112/78   08/10/22 114/68                 Passed - Recent (12 mo) or future (30 days) visit within the authorizing provider's specialty     Patient has had an office visit with the authorizing provider or a provider within the authorizing providers department within the previous 12 mos or has a future within next 30 days. See \"Patient Info\" tab in inbasket, or \"Choose Columns\" in Meds & Orders section of the refill encounter.              Passed - Patient has mammogram in past 2 years on file if age 50-75        Passed - Medication is active on med list        Passed - Patient is 18 years of age or older        Passed - No active pregnancy on record        Passed - No positive pregnancy test on record in past 12 months           Last Written Prescription Date:  8/11/22  Last Fill Quantity: #24, 3 refills  Last office visit: 8/10/22  Future Appointments 7/24/2023 - 1/20/2024        Date Visit Type Length Department Provider     10/6/2023  8:30 AM PHYSICAL 30 min [facility] Shauna Arceo MD    Location Instructions:     The clinic is located at [address] [place] [address], [address] [address]              12/28/2023  8:00 AM NEW ENDOCRINE 60 min [facility] Carlos Alberto Alejo MD    Location Instructions:     Located in the Clinics and [facility] at [address] [place] [id number]. For parking options, enter the [facility] holden/yoni loya from [facility] and attendants can assist you based on your needs.  parking is available for those with limited mobility M-F from 7 a.m. to 5 p.m. Due to " short staffing, we are unable to offer  to all patients/visitors. Visit [email or web address] for more details.  Self-parking:&nbsp;  West Lot: Located across from the main entrance, this is a convenient option for patients. Enter on [facility]. Parking attendants available most hours to assist.&nbsp;     [facility] Ramp: Enter at the [facility] SE entrance (one block north of the [facility] main entrance). Do not enter the ramp from [facility] - this entrance is not staffed and is further from the [facility] main entrance.                   Medication is being filled for 1 time refill only due to:  Patient needs to be seen because it has been more than one year since last visit. Future appt scheduled.    Felicita Montoya RN on 7/24/2023 at 10:03 AM

## 2023-08-11 ENCOUNTER — TRANSFERRED RECORDS (OUTPATIENT)
Dept: HEALTH INFORMATION MANAGEMENT | Facility: CLINIC | Age: 53
End: 2023-08-11
Payer: COMMERCIAL

## 2023-08-14 DIAGNOSIS — E05.90 HYPERTHYROIDISM: Primary | ICD-10-CM

## 2023-08-20 DIAGNOSIS — N95.1 SYMPTOMS, SUCH AS FLUSHING, SLEEPLESSNESS, HEADACHE, LACK OF CONCENTRATION, ASSOCIATED WITH THE MENOPAUSE: ICD-10-CM

## 2023-08-20 DIAGNOSIS — Z79.890 POST-MENOPAUSE ON HRT (HORMONE REPLACEMENT THERAPY): ICD-10-CM

## 2023-08-20 RX ORDER — ESTRADIOL 0.07 MG/D
FILM, EXTENDED RELEASE TRANSDERMAL
Qty: 24 PATCH | Refills: 0 | Status: SHIPPED | OUTPATIENT
Start: 2023-08-20 | End: 2024-02-24

## 2023-08-21 NOTE — TELEPHONE ENCOUNTER
"Requested Prescriptions   Pending Prescriptions Disp Refills    estradiol (VIVELLE-DOT) 0.075 MG/24HR BIW patch [Pharmacy Med Name: ESTRADIOL 0.075 MG PATCH(2/WK)] 24 patch 1     Sig: APPLY 1 PATCH TWICE WEEKLY       Hormone Replacement Therapy Failed - 8/20/2023 12:33 PM        Failed - Recent (12 mo) or future (30 days) visit within the authorizing provider's specialty     Patient has had an office visit with the authorizing provider or a provider within the authorizing providers department within the previous 12 mos or has a future within next 30 days. See \"Patient Info\" tab in inbasket, or \"Choose Columns\" in Meds & Orders section of the refill encounter.              Passed - Blood pressure under 140/90 in past 12 months     BP Readings from Last 3 Encounters:   07/19/23 114/74   08/11/22 112/78   08/10/22 114/68                 Passed - Patient has mammogram in past 2 years on file if age 50-75        Passed - Medication is active on med list        Passed - Patient is 18 years of age or older        Passed - No active pregnancy on record        Passed - No positive pregnancy test on record in past 12 months           Last Written Prescription Date:  7/24/23  Last Fill Quantity: 8,  # refills: 2   Last office visit: 8/10/2022 ; last virtual visit: Visit date not found with prescribing provider:  Adis   Future Office Visit:   Next 5 appointments (look out 90 days)      Oct 06, 2023  8:30 AM  PHYSICAL with Shauna Arceo MD  [facility] ([facility] ) [address] [place] [phone]           Medication is being filled for 1 time refill only due to:  Patient needs to be seen because it has been more than one year since last visit.  Appt scheduled  Tash Mcguire RN on 8/20/2023 at 8:59 PM          "

## 2023-09-07 ENCOUNTER — TELEPHONE (OUTPATIENT)
Dept: FAMILY MEDICINE | Facility: CLINIC | Age: 53
End: 2023-09-07
Payer: COMMERCIAL

## 2023-09-07 NOTE — TELEPHONE ENCOUNTER
Reason for Call:  Appointment Request    Patient requesting this type of appt:  Pain legs and veins    Requested provider: Tim Hill    Reason patient unable to be scheduled: Not within requested timeframe    When does patient want to be seen/preferred time: 1-2 days    Comments: Patient complaint of pain in legs and veins are showing. Patient already scheduled 10/20/23 and on wait list. But requesting sooner.    Could we send this information to you in [facility] or would you prefer to receive a phone call?:   Patient would prefer a phone call   Okay to leave a detailed message?: Yes at Cell number on file:    Telephone Information:   Mobile [phone]       Call taken on 9/7/2023 at 7:47 AM by Flower Alcaraz

## 2023-09-07 NOTE — TELEPHONE ENCOUNTER
Called patient, left voicemail stating that Dr. Hill has an opening on 9/13, can use one of the same day spots.

## 2023-09-13 ENCOUNTER — OFFICE VISIT (OUTPATIENT)
Dept: FAMILY MEDICINE | Facility: CLINIC | Age: 53
End: 2023-09-13
Payer: COMMERCIAL

## 2023-09-13 VITALS
SYSTOLIC BLOOD PRESSURE: 104 MMHG | DIASTOLIC BLOOD PRESSURE: 76 MMHG | OXYGEN SATURATION: 98 % | WEIGHT: 180 LBS | HEART RATE: 77 BPM | TEMPERATURE: 97.3 F | RESPIRATION RATE: 14 BRPM | BODY MASS INDEX: 31.39 KG/M2

## 2023-09-13 DIAGNOSIS — R58: ICD-10-CM

## 2023-09-13 DIAGNOSIS — I78.1 TELANGIECTASIA: Primary | ICD-10-CM

## 2023-09-13 PROCEDURE — 99214 OFFICE O/P EST MOD 30 MIN: CPT | Performed by: FAMILY MEDICINE

## 2023-09-13 ASSESSMENT — PAIN SCALES - GENERAL: PAINLEVEL: MODERATE PAIN (5)

## 2023-09-13 ASSESSMENT — ENCOUNTER SYMPTOMS: LEG PAIN: 1

## 2023-09-13 NOTE — PROGRESS NOTES
Assessment & Plan     Telangiectasia  On bilateral LE around calves, has tenderness with bruise, has heavy sensation and intermittent weakness, will have her to try vascular  surgery(Dr Evelio Simons) for further evaluation and potential procedure   - Vascular Surgery Referral; Future    Vascular rupture  Mentioned above   - Vascular Surgery Referral; Future           FUTURE APPOINTMENTS:       - Follow-up visit in 10 months for CPE    MD REDD Arguelles [facility] YEISON Isaacs is a 52 year old, presenting for the following health issues:  Leg Pain (Bialteral leg pain )        9/13/2023    10:13 AM   Additional Questions   Roomed by Ronnie RAINEY       Leg Pain  This is a new problem. The problem occurs constantly. The problem has been gradually worsening.   History of Present Illness       Reason for visit:  Spider veins on my legs that hurt, swelling and pain  Symptom onset:  3-7 days ago  Symptoms include:  Increased number of spider veins  Symptom intensity:  Moderate  Symptom progression:  Worsening  Had these symptoms before:  No    She eats 4 or more servings of fruits and vegetables daily.She consumes 0 sweetened beverage(s) daily.She exercises with enough effort to increase her heart rate 30 to 60 minutes per day.  She exercises with enough effort to increase her heart rate 7 days per week.   She is taking medications regularly.       Review of Systems   Constitutional, HEENT, cardiovascular, pulmonary, gi and gu systems are negative, except as otherwise noted.      Objective    Temp 97.3  F (36.3  C) (Tympanic)   Wt 81.6 kg (180 lb)   LMP 10/26/2014   BMI 31.39 kg/m    Body mass index is 31.39 kg/m .  Physical Exam   GENERAL: healthy, alert and no distress  NECK: no adenopathy, no asymmetry, masses, or scars and thyroid normal to palpation  RESP: lungs clear to auscultation - no rales, rhonchi or wheezes  CV: regular rate and rhythm, normal S1 S2, no S3 or S4, no murmur,  click or rub, no peripheral edema and peripheral pulses strong  ABDOMEN: soft, nontender, no hepatosplenomegaly, no masses and bowel sounds normal  MS: no gross musculoskeletal defects noted, no edema

## 2023-09-15 ENCOUNTER — OFFICE VISIT (OUTPATIENT)
Dept: VASCULAR SURGERY | Facility: CLINIC | Age: 53
End: 2023-09-15
Payer: COMMERCIAL

## 2023-09-15 DIAGNOSIS — R58: ICD-10-CM

## 2023-09-15 DIAGNOSIS — M79.605 BILATERAL LEG PAIN: Primary | ICD-10-CM

## 2023-09-15 DIAGNOSIS — I78.1 TELANGIECTASIA: Primary | ICD-10-CM

## 2023-09-15 DIAGNOSIS — M79.604 BILATERAL LEG PAIN: Primary | ICD-10-CM

## 2023-09-15 DIAGNOSIS — I78.1 TELANGIECTASIA: ICD-10-CM

## 2023-09-15 PROCEDURE — 99203 OFFICE O/P NEW LOW 30 MIN: CPT | Performed by: SURGERY

## 2023-09-15 PROCEDURE — 99207 PR NO CHARGE NURSE ONLY: CPT

## 2023-09-15 PROCEDURE — A6533 GC STOCKING THIGHLNGTH 18-30: HCPCS

## 2023-09-15 NOTE — LETTER
9/15/2023         RE: Shital Parrish  [address]  [place] [address]        Dear Colleague,    Thank you for referring your patient, Shital Parrish, to the [facility] [place]. Please see a copy of my visit note below.      [facility] CONSULTATION    HPI:    Shital Parrish is a pleasant 52 year old female who presents with complaints of bilateral lower extremity pain, swelling and spider veins.  The patient has had spider veins reticular veins on her legs for more than 20 years.  She had sclerotherapy about 20 years ago.  She has a few telangiectasias and reticular veins which have recurred but the main reason for presentation is swelling over the last year.  She has been sitting more in her work over the last year which may explain some of the swelling but she has been exercising regularly since May and this has not improved her swelling.  Her legs are tender and painful, especially behind her knees if she flexes her knees and even when she is lying down and has pressure against the backs of her knees.  She experiences tightness and cramping in these areas.  The pain tends to be worse after she has stood on her feet for long hours but also occurs after sitting for long periods of time.  The pain is primarily in her calves and behind her knees and is associated with excessive fatigue.  Heaviness, numbness and itching are also troubling symptoms.    The symptoms are interfering with actives day living because it makes it difficult for her to sit for long periods of time, interrupts her sleep and also makes it painful to walk distances.    She has no history of deep vein thrombosis, superficial thrombophlebitis or hemorrhage.    Her family history is significant for varicose veins in her father and in her father's family.  Her mother has swelling of the legs which seem to be worse after she had an accident but her brother felt that the swelling in his mother's legs had been present  "prior to the accident.  The patient stated that \"my legs reminded me of her.\"      PAST MEDICAL HISTORY:   Past Medical History:   Diagnosis Date     Edema 10/24/2013     Gastro-oesophageal reflux disease      Heavy periods 10/24/2013     Hyperthyroidism 2014    borderline-treated with a med for 6 months in [place] and then numbers improved. had a low TSH here and referred to [facility]. numbers there were borderline but normal and no antibodies. rec. -I123 uptake was increased so Graves dz dx'd. may do methimazole if repeat TFTs show hyperthyroid     Leiomyoma of uterus, unspecified 2014     Nipple discharge     right side only. neg mammo and right breast u/s. MRI pending     PONV (postoperative nausea and vomiting)      Post-menopause on HRT (hormone replacement therapy) 2021     Pure hypercholesterolemia 2021     Varicose veins of lower extremities with other complications 10/24/2013       PAST SURGICAL HISTORY:   Past Surgical History:   Procedure Laterality Date     APPENDECTOMY        SECTION       CHOLECYSTECTOMY, LAPOROSCOPIC  2012    Cholecystectomy, Laparoscopic     COLONOSCOPY N/A 2017    Procedure: COLONOSCOPY;  COLONOSCOPY;  Surgeon: Shannon Noel MD;  Location:  GI     COLONOSCOPY N/A 2019    Procedure: COLONOSCOPY;  Surgeon: Shannon Noel MD;  Location:  GI     HYSTERECTOMY SUPRACERVICAL N/A 2014    Procedure: HYSTERECTOMY SUPRACERVICAL;  Surgeon: Shauna Arceo MD;  Location: [facility] done by Adis/ Leora. started as laparoscopic but converted to open due to adhesions     LAPAROSCOPIC CYSTECTOMY OVARIAN (ONCOLOGY)      right. benign cyst. partial oopherectomy in [facility]     LAPAROSCOPIC SALPINGO-OOPHORECTOMY  14     MYOMECTOMY UTERUS  2008    hysteroscopic     TONSILLECTOMY  1979       FAMILY HISTORY:   Family History   Problem Relation Age of Onset     Diabetes Mother      Cancer Mother         melanoma "     Colon Polyps Mother      Coronary Artery Disease Father      No Known Problems Sister      Diabetes Maternal Grandmother      Breast Cancer Maternal Aunt      Thyroid Disease Cousin      No Known Problems Brother      No Known Problems Maternal Grandfather      No Known Problems Paternal Grandmother      Breast Cancer Other         Maternal Sister     Asthma No family hx of        SOCIAL HISTORY:   Social History     Tobacco Use     Smoking status: Former     Packs/day: 0.00     Years: 0.00     Pack years: 0.00     Types: Cigarettes     Smokeless tobacco: Never     Tobacco comments:     Quit 31+ years ago.   Substance Use Topics     Alcohol use: Yes     Comment: Social Drinking       REVIEW OF SYSTEMS: Review Of Systems  Skin: Itching  Eyes: negative  Ears/Nose/Throat: negative  Respiratory: No shortness of breath, dyspnea on exertion, cough, or hemoptysis  Cardiovascular: negative  Gastrointestinal: negative  Genitourinary: negative  Musculoskeletal: Leg cramping, leg swelling  Neurologic: negative  Psychiatric: negative  Hematologic/Lymphatic/Immunologic: negative  Endocrine: Hyperthyroidism      Vital signs:  LMP 10/26/2014     Current Outpatient Medications   Medication Sig Dispense Refill     ciclopirox (PENLAC) 8 % external solution Apply 8 Application topically as needed To nails nails (Patient not taking: Reported on 8/10/2022)       EPIDUO FORTE 0.3-2.5 % gel Apply 0.3 Pump topically as needed (Patient not taking: Reported on 9/13/2023)       estradiol (VIVELLE-DOT) 0.075 MG/24HR BIW patch APPLY 1 PATCH TWICE WEEKLY 24 patch 0     fexofenadine (ALLEGRA) 180 MG tablet Take 180 mg by mouth daily       methimazole (TAPAZOLE) 5 MG tablet TAKE 1/2 TABLET BY MOUTH DAILY       pravastatin (PRAVACHOL) 20 MG tablet Take 1 tablet (20 mg) by mouth daily 90 tablet 3       PHYSICAL EXAM:  General: Pleasant, NAD.   HEENT: Normocephalic, atraumatic, external ears and nose normal.   Respiratory: Normal respiratory  effort.   Cardiovascular: Pulse is regular.   Musculoskeletal: Gait and station normal.  The joints of her fingers and toes without deformity.  There is no cyanosis of her nailbeds.   EXTREMITIES: Right lower extremity: No significant varicose veins.  There is a small, ecchymotic area on the posterior lateral right proximal calf.  A few, small, scattered telangiectasias tissue deposition on her distal medial thighs, proximal medial calves and ankles with an ankle cutoff sign (mild) suggestive of mild lipedema.    Left lower extremity: No varicose veins.  Ecchymotic area in the posterior lateral left calf.  No significant reticular veins.  A few scattered telangiectasias.  Tissue deposition on the distal medial thigh, proximal medial calf and a mild ankle cutoff sign suggestive of mild lipedema.    PULSES: R/L (3=normal pulse, 0=no palpable pulse) dorsalis pedis: 3/3; posterior tibial: 3/3.      Neurologic: Grossly normal  Psychiatric: Mood, affect, judgment and insight are normal     ASSESSMENT:  Bilateral lower extremity pain, swelling and scattered veins.  The pain is significant enough that it is interfering with actives of daily living and with her sleep.    We discussed lower extremity vein anatomy and the pathophysiology of venous insufficiency utilizing a leg vein drawing.  The option of continued conservative measures with small risk of superficial thrombophlebitis, bleeding and progression of disease process were discussed.    Given the significance of her pain, bilateral extremity venous competency studies are indicated.  We briefly discussed techniques for treating superficial, axial incompetence utilizing endovenous ablation in the office setting.  Risks of bleeding, infection, nerve injury, deep vein thrombosis and possible glue hypersensitivity response were discussed.    The pattern of the tissue deposition on her legs is suggestive of early lipedema.  The tenderness of her legs as well as the pain  and mild swelling may be related to the inflammatory component of lipedema.  I discussed this with the patient frankly.    We will begin our standard treatment for lipedema utilizing nocturnal edema wear, L formula of dialysis Monday and compression hose during her working hours.    PLAN:  1.  Bilateral lower extremity venous competency studies with virtual visit to discuss results  2.  20-30 mmHg compression hose  3.  Edema wear  4.  L formula diosmin  5.  10,000 steps daily     Evelio Simons MD, FACS    Dictated using Dragon voice recognition software which may result in transcription errors        VEIN CLINIC LEG DRAWING:                Again, thank you for allowing me to participate in the care of your patient.        Sincerely,        Evelio Simons MD

## 2023-09-15 NOTE — PATIENT INSTRUCTIONS
Lymphatic  L  Formula:   Active wound-Take (1) 500mg capsule twice daily.  For 2 weeks and then 1 x day  Closed wounds/No wound-Take (1) 500mg capsule once per day  *Order from [employer] or [employer] ([phone])*     You have been recommended to wear Edemawear.    How to Apply EdemaWear   - Apply EdemaWear starting from toes pulling up to knees with a cuff at the top of the stockings.  - DO NOT CUT OR TRIM STOCKINGS, fold over the stocking until the top of it lays just BELOW your knee crease  - If wearing socks, wear them over top of EdemaWear. EdemaWear should be in direct contact with the skin.  - Apply a fresh pair daily    Care of EdemaWear  - DO NOT THROW AWAY THE OLD PAIR OF EDEMAWEAR, WASH IT.       o  Hand or machine wash in cold water and hang dry    EdemaWear Sizes  Size  Calf circumference  Stripe color   Small  up to 45cm  Navy_______   Medium up to 75cm  Yellow______   Large  up to 115cm  Red________   X-Large up to 150cm  Aqua_______    Options for purchasing  - [employer] ([facility])    [address] [place] [address]    Ph: [phone]    - [employer] (Abdirahman RAMOS)    Ph: [phone]  Email: [email or web address]     Visit [email or web address][email or web address] for more information       Recommend 10,000 steps daily

## 2023-09-15 NOTE — PROGRESS NOTES
"  [facility]SULT[facility]    HPI:    Shital Parrish is a pleasant 52 year old female who presents with complaints of bilateral lower extremity pain, swelling and spider veins.  The patient has had spider veins reticular veins on her legs for more than 20 years.  She had sclerotherapy about 20 years ago.  She has a few telangiectasias and reticular veins which have recurred but the main reason for presentation is swelling over the last year.  She has been sitting more in her work over the last year which may explain some of the swelling but she has been exercising regularly since May and this has not improved her swelling.  Her legs are tender and painful, especially behind her knees if she flexes her knees and even when she is lying down and has pressure against the backs of her knees.  She experiences tightness and cramping in these areas.  The pain tends to be worse after she has stood on her feet for long hours but also occurs after sitting for long periods of time.  The pain is primarily in her calves and behind her knees and is associated with excessive fatigue.  Heaviness, numbness and itching are also troubling symptoms.    The symptoms are interfering with actives day living because it makes it difficult for her to sit for long periods of time, interrupts her sleep and also makes it painful to walk distances.    She has no history of deep vein thrombosis, superficial thrombophlebitis or hemorrhage.    Her family history is significant for varicose veins in her father and in her father's family.  Her mother has swelling of the legs which seem to be worse after she had an accident but her brother felt that the swelling in his mother's legs had been present prior to the accident.  The patient stated that \"my legs reminded me of her.\"      PAST MEDICAL HISTORY:   Past Medical History:   Diagnosis Date    Edema 10/24/2013    Gastro-oesophageal reflux disease     Heavy periods 10/24/2013    Hyperthyroidism " 2014    borderline-treated with a med for 6 months in [facility] and then numbers improved. had a low TSH here and referred to [facility]. numbers there were borderline but normal and no antibodies. rec. -I123 uptake was increased so Graves dz dx'd. may do methimazole if repeat TFTs show hyperthyroid    Leiomyoma of uterus, unspecified 2014    Nipple discharge     right side only. neg mammo and right breast u/s. MRI pending    PONV (postoperative nausea and vomiting)     Post-menopause on HRT (hormone replacement therapy) 2021    Pure hypercholesterolemia 2021    Varicose veins of lower extremities with other complications 10/24/2013       PAST SURGICAL HISTORY:   Past Surgical History:   Procedure Laterality Date    APPENDECTOMY       SECTION      CHOLECYSTECTOMY, LAPOROSCOPIC  2012    Cholecystectomy, Laparoscopic    COLONOSCOPY N/A 2017    Procedure: COLONOSCOPY;  COLONOSCOPY;  Surgeon: Shannon Noel MD;  Location:  GI    COLONOSCOPY N/A 2019    Procedure: COLONOSCOPY;  Surgeon: Shannon Noel MD;  Location:  GI    HYSTERECTOMY SUPRACERVICAL N/A 2014    Procedure: HYSTERECTOMY SUPRACERVICAL;  Surgeon: Shauna Arceo MD;  Location:  OR [facility] and [facility] done by Adis/ Leora. started as laparoscopic but converted to open due to adhesions    LAPAROSCOPIC CYSTECTOMY OVARIAN (ONCOLOGY)      right. benign cyst. partial oopherectomy in [facility]    LAPAROSCOPIC SALPINGO-OOPHORECTOMY  14    MYOMECTOMY UTERUS  2008    hysteroscopic    TONSILLECTOMY  1979       FAMILY HISTORY:   Family History   Problem Relation Age of Onset    Diabetes Mother     Cancer Mother         melanoma    Colon Polyps Mother     Coronary Artery Disease Father     No Known Problems Sister     Diabetes Maternal Grandmother     Breast Cancer Maternal Aunt     Thyroid Disease Cousin     No Known Problems Brother     No Known Problems Maternal Grandfather     No Known Problems  Paternal Grandmother     Breast Cancer Other         Maternal Sister    Asthma No family hx of        SOCIAL HISTORY:   Social History     Tobacco Use    Smoking status: Former     Packs/day: 0.00     Years: 0.00     Pack years: 0.00     Types: Cigarettes    Smokeless tobacco: Never    Tobacco comments:     Quit 31+ years ago.   Substance Use Topics    Alcohol use: Yes     Comment: Social Drinking       REVIEW OF SYSTEMS: Review Of Systems  Skin: Itching  Eyes: negative  Ears/Nose/Throat: negative  Respiratory: No shortness of breath, dyspnea on exertion, cough, or hemoptysis  Cardiovascular: negative  Gastrointestinal: negative  Genitourinary: negative  Musculoskeletal: Leg cramping, leg swelling  Neurologic: negative  Psychiatric: negative  Hematologic/Lymphatic/Immunologic: negative  Endocrine: Hyperthyroidism      Vital signs:  LMP 10/26/2014     Current Outpatient Medications   Medication Sig Dispense Refill    ciclopirox (PENLAC) 8 % external solution Apply 8 Application topically as needed To nails nails (Patient not taking: Reported on 8/10/2022)      EPIDUO FORTE 0.3-2.5 % gel Apply 0.3 Pump topically as needed (Patient not taking: Reported on 9/13/2023)      estradiol (VIVELLE-DOT) 0.075 MG/24HR BIW patch APPLY 1 PATCH TWICE WEEKLY 24 patch 0    fexofenadine (ALLEGRA) 180 MG tablet Take 180 mg by mouth daily      methimazole (TAPAZOLE) 5 MG tablet TAKE 1/2 TABLET BY MOUTH DAILY      pravastatin (PRAVACHOL) 20 MG tablet Take 1 tablet (20 mg) by mouth daily 90 tablet 3       PHYSICAL EXAM:  General: Pleasant, NAD.   HEENT: Normocephalic, atraumatic, external ears and nose normal.   Respiratory: Normal respiratory effort.   Cardiovascular: Pulse is regular.   Musculoskeletal: Gait and station normal.  The joints of her fingers and toes without deformity.  There is no cyanosis of her nailbeds.   EXTREMITIES: Right lower extremity: No significant varicose veins.  There is a small, ecchymotic area on the  posterior lateral right proximal calf.  A few, small, scattered telangiectasias tissue deposition on her distal medial thighs, proximal medial calves and ankles with an ankle cutoff sign (mild) suggestive of mild lipedema.    Left lower extremity: No varicose veins.  Ecchymotic area in the posterior lateral left calf.  No significant reticular veins.  A few scattered telangiectasias.  Tissue deposition on the distal medial thigh, proximal medial calf and a mild ankle cutoff sign suggestive of mild lipedema.    PULSES: R/L (3=normal pulse, 0=no palpable pulse) dorsalis pedis: 3/3; posterior tibial: 3/3.      Neurologic: Grossly normal  Psychiatric: Mood, affect, judgment and insight are normal     ASSESSMENT:  Bilateral lower extremity pain, swelling and scattered veins.  The pain is significant enough that it is interfering with actives of daily living and with her sleep.    We discussed lower extremity vein anatomy and the pathophysiology of venous insufficiency utilizing a leg vein drawing.  The option of continued conservative measures with small risk of superficial thrombophlebitis, bleeding and progression of disease process were discussed.    Given the significance of her pain, bilateral extremity venous competency studies are indicated.  We briefly discussed techniques for treating superficial, axial incompetence utilizing endovenous ablation in the office setting.  Risks of bleeding, infection, nerve injury, deep vein thrombosis and possible glue hypersensitivity response were discussed.    The pattern of the tissue deposition on her legs is suggestive of early lipedema.  The tenderness of her legs as well as the pain and mild swelling may be related to the inflammatory component of lipedema.  I discussed this with the patient frankly.    We will begin our standard treatment for lipedema utilizing nocturnal edema wear, L formula of dialysis Monday and compression hose during her working hours.    PLAN:  1.   Bilateral lower extremity venous competency studies with virtual visit to discuss results  2.  20-30 mmHg compression hose  3.  Edema wear  4.  L formula diosmin  5.  10,000 steps daily     Evelio Simons MD, FACS    Dictated using Dragon voice recognition software which may result in transcription errors        VEIN CLINIC LEG DRAWING:

## 2023-09-15 NOTE — NURSING NOTE
Patient Reported symptoms:    Right leg   Heaviness A good bit of the time   Achiness A good bit of the time   Swelling A good bit of the time   Throbbing none of the time  Itching A little of the time   Appearance Moderately noticeable   Impact on work/activities Symptoms but full able to participate    Left Leg   Heaviness A good bit of the time   Achiness A good bit of the time   Swelling A good bit of the time   Throbbing None of the time   Itching A little of the time   Appearance Moderately noticeable   Impact on work/activities Symptoms but full able to participate

## 2023-09-26 ENCOUNTER — TRANSFERRED RECORDS (OUTPATIENT)
Dept: HEALTH INFORMATION MANAGEMENT | Facility: CLINIC | Age: 53
End: 2023-09-26
Payer: COMMERCIAL

## 2023-09-29 ENCOUNTER — TRANSFERRED RECORDS (OUTPATIENT)
Dept: HEALTH INFORMATION MANAGEMENT | Facility: CLINIC | Age: 53
End: 2023-09-29

## 2023-10-01 ENCOUNTER — TRANSFERRED RECORDS (OUTPATIENT)
Dept: HEALTH INFORMATION MANAGEMENT | Facility: CLINIC | Age: 53
End: 2023-10-01

## 2023-10-02 ENCOUNTER — TRANSFERRED RECORDS (OUTPATIENT)
Dept: HEALTH INFORMATION MANAGEMENT | Facility: CLINIC | Age: 53
End: 2023-10-02

## 2023-10-09 ENCOUNTER — TRANSFERRED RECORDS (OUTPATIENT)
Dept: FAMILY MEDICINE | Facility: CLINIC | Age: 53
End: 2023-10-09
Payer: COMMERCIAL

## 2023-10-13 ENCOUNTER — ANCILLARY PROCEDURE (OUTPATIENT)
Dept: ULTRASOUND IMAGING | Facility: CLINIC | Age: 53
End: 2023-10-13
Payer: COMMERCIAL

## 2023-10-13 DIAGNOSIS — R58: ICD-10-CM

## 2023-10-20 ENCOUNTER — OFFICE VISIT (OUTPATIENT)
Dept: FAMILY MEDICINE | Facility: CLINIC | Age: 53
End: 2023-10-20
Payer: COMMERCIAL

## 2023-10-20 VITALS
DIASTOLIC BLOOD PRESSURE: 86 MMHG | OXYGEN SATURATION: 98 % | WEIGHT: 183.8 LBS | BODY MASS INDEX: 32.05 KG/M2 | SYSTOLIC BLOOD PRESSURE: 126 MMHG | TEMPERATURE: 97.9 F

## 2023-10-20 DIAGNOSIS — I78.1 TELANGIECTASIA: Primary | ICD-10-CM

## 2023-10-20 DIAGNOSIS — M25.562 ACUTE PAIN OF LEFT KNEE: ICD-10-CM

## 2023-10-20 PROCEDURE — 99214 OFFICE O/P EST MOD 30 MIN: CPT | Performed by: FAMILY MEDICINE

## 2023-10-20 ASSESSMENT — PAIN SCALES - GENERAL: PAINLEVEL: NO PAIN (0)

## 2023-10-20 NOTE — PROGRESS NOTES
Assessment & Plan     Telangiectasia  Being seen by vascular surgery, did vein competency US, is supposed to see him for follow up next week   Encouraged him to have further discussion about the results and plan for potential procedure       Acute pain of left knee  Has been seen by [facility], her pain has been slightly improved. She is supposed to do knee surgery soon per Dr Vail, she wants 2nd opinion on the finding and decision,   Will review her MRI and consider if she can have benefit of 2nd opinion             FUTURE APPOINTMENTS:       - Follow-up visit in 9 months for CPE    Tim Hill MD  [facility] YEISON sIaacs is a 52 year old, presenting for the following health issues:  Follow Up      History of Present Illness       Reason for visit:  Follow up visit    She eats 4 or more servings of fruits and vegetables daily.She consumes 0 sweetened beverage(s) daily.She exercises with enough effort to increase her heart rate 30 to 60 minutes per day.  She exercises with enough effort to increase her heart rate 7 days per week.   She is taking medications regularly.     Review of Systems   Constitutional, HEENT, cardiovascular, pulmonary, gi and gu systems are negative, except as otherwise noted.      Objective    /86   Temp 97.9  F (36.6  C) (Temporal)   Wt 83.4 kg (183 lb 12.8 oz)   LMP 10/26/2014   SpO2 98%   BMI 32.05 kg/m    Body mass index is 32.05 kg/m .  Physical Exam   GENERAL: healthy, alert and no distress  NECK: no adenopathy, no asymmetry, masses, or scars and thyroid normal to palpation  RESP: lungs clear to auscultation - no rales, rhonchi or wheezes  CV: regular rate and rhythm, normal S1 S2, no S3 or S4, no murmur, click or rub, no peripheral edema and peripheral pulses strong  ABDOMEN: soft, nontender, no hepatosplenomegaly, no masses and bowel sounds normal  MS: no gross musculoskeletal defects noted, no edema

## 2023-10-26 ENCOUNTER — VIRTUAL VISIT (OUTPATIENT)
Dept: VASCULAR SURGERY | Facility: CLINIC | Age: 53
End: 2023-10-26
Payer: COMMERCIAL

## 2023-10-26 DIAGNOSIS — M79.604 BILATERAL LEG PAIN: Primary | ICD-10-CM

## 2023-10-26 DIAGNOSIS — M79.605 BILATERAL LEG PAIN: Primary | ICD-10-CM

## 2023-10-26 PROCEDURE — 99213 OFFICE O/P EST LOW 20 MIN: CPT | Mod: VID | Performed by: SURGERY

## 2023-10-26 NOTE — PROGRESS NOTES
[facility] [place] Progress Note    Shital Parrish presents in follow-up of Bilateral lower extremity Pain, swelling and spider veins.  Please see my consultation of 9/15/2023 for details.    Since I have seen her, she had an episode of acute pain in her left knee with pain extending down into her left posterior calf with what she believed was some calf swelling.  An MRI revealed a left medial meniscal tear.  She will be seeing an orthopedic surgeon in this regard.    She returned on 10/13/2023 for a bilateral lower extremity venous competency study, the results of which were discussed on today's video visit.    Physical Exam  General: Pleasant female in no acute distress.  Blood pressure 115/78, pulse 76  Extremities:  Right lower extremity: No significant varicose veins.  There is a small, ecchymotic area on the posterior lateral right proximal calf.  A few, small, scattered telangiectasias tissue deposition on her distal medial thighs, proximal medial calves and ankles with an ankle cutoff sign (mild) suggestive of mild lipedema.     Left lower extremity: No varicose veins.  Ecchymotic area in the posterior lateral left calf.  No significant reticular veins.  A few scattered telangiectasias.  Tissue deposition on the distal medial thigh, proximal medial calf and a mild ankle cutoff sign sugges    Ultrasound:  Name: Shital Parrish     Patient ID: [id number]   Date: 2023      : 1970   Sex: female      Examined by: SUZANNA Dennison RVT   Age:  52 year old     Reading MD: DAT     INDICATION: Vascular rupture.     EXAM TYPE   BILATERAL LOWER EXTREMITY VENOUS DUPLEX FOR VENOUS INSUFFICIENCY   TECHNICAL SUMMARY     A duplex ultrasound study using color flow was performed, to evaluate the bilateral lower extremity veins for valvular incompetence with the patient in a steep reversed trendelenberg.     RIGHT:     The deep veins demonstrate phasic flow, compress, and respond to  augmentations.  There is no reflux or DVT.      The GSV demonstrates phasic flow, compresses and responds to augmentations from the saphenofemoral junction to the ankle with no evidence of reflux or thrombus. The greater saphenous vein measures 8.7 mm at the saphenofemoral junction, 6.7 mm in the proximal thigh, and 4.5 mm at the knee.     The AASV is competent ( 4.1 mm) draining into the saphenofemoral junction.     The Giacomini vein is competent ( 0.9 mm) communicating with the small saphenous vein at the knee level.     The SSV demonstrates phasic flow, compresses, and responds to augmentations from the popliteal space to the ankle.  No reflux or thrombus is seen. The saphenopopliteal junction is competent (2.5 mm).     Perforators: There is no evidence of incompetent  veins at any level.     LEFT:     The deep veins demonstrate phasic flow, compress, and respond to augmentations.  There is no reflux or DVT.      The GSV demonstrates phasic flow, compresses, and responds to augmentations from the saphenofemoral junction to the ankle with no evidence of reflux or thrombus. The greater saphenous vein measures 8.7 mm at the saphenofemoral junction, 6.9 mm in the proximal thigh, and 4.6 mm at the knee.     The AASV is competent ( 1.8 mm) draining into the saphenofemoral junction.     The Giacomini vein is competent ( 0.9 mm) communicating with the small saphenous vein at the knee level.     The SSV demonstrates phasic flow, compresses, and responds to augmentations from the popliteal space to the ankle.  No reflux or thrombus is seen. The saphenopopliteal junction is competent ( 3.6 mm).     Perforators: There is no evidence of incompetent  veins at any level.     FINAL SUMMARY:     Incompetence Criteria:   1.  No evidence of deep venous thrombosis in the right lower extremity.  2.  Right lower extremity deep venous system, great saphenous vein, anterior accessory saphenous vein, Giacomini vein  and small saphenous veins are competent.  3.  No evidence of deep venous thrombosis in the left lower extremity.  4. Left lower extremity deep venous system, great saphenous vein, anterior accessory saphenous vein, Giacomini vein and small saphenous veins are competent.    Greater than 500 milliseconds of reflux measured in the superficial and perforating veins and greater than 1000 milliseconds of reflux measured in the deep veins.      RON SOLER M.D., FACS, RPVI      Assessment:  No venous insufficiency contributing to her symptoms.  I believe she has physical findings consistent with lipedema.  Measures to treat her lipedema including L form of diosmin, edema wear, compression and increasing her physical activities have been instituted.    The more pressing concern at the time is with her left meniscus tear and left knee pain.  I recommend that she see an orthopedic surgeon in this regard.    She inquired about follow-up with me from the standpoint of treatment recommendations.  I will have her return in about 3 months.    Plan:  3-month follow-up, in office visit    Evelio Simons MD    Dictated using Dragon voice recognition software which may result in transcription errors

## 2023-10-26 NOTE — PROGRESS NOTES
Shital is a 52 year old who is being evaluated via a billable video visit.      How would you like to obtain your AVS? [email or web address]  If the video visit is dropped, the invitation should be resent by: Text to cell phone: [phone]  Will anyone else be joining your video visit? No        Video-Visit Details    Type of service:  Video Visit     Originating Location (pt. Location): Home    Distant Location (provider location):  On-site  [facility] Visit[facility]

## 2023-10-26 NOTE — LETTER
"    10/26/2023         RE: Shital Parrish  [address]  [place] [address]        Dear Colleague,    Thank you for referring your patient, Shital Parrish, to the [facility] [place]. Please see a copy of my visit note below.    Shital is a 52 year old who is being evaluated via a billable video visit.      How would you like to obtain your AVS? MyChart  If the video visit is dropped, the invitation should be resent by: Text to cell phone: [phone]  Will anyone else be joining your video visit? No  {If patient encounters technical issues they should call [phone] :[id number]}      Video-Visit Details    Type of service:  Video Visit     Originating Location (pt. Location): {video visit patient location:032980::\"Home\"}  {PROVIDER LOCATION On-site should be selected for visits conducted from your clinic location or adjoining [facility] hospital, academic office, or other nearby [facility] building. Off-site should be selected for all other provider locations, including home:[id number]}  Distant Location (provider location):  {virtual location provider:066255}  Platform used for Video Visit: Bluefly      [facility] [place] Progress Note    Shital Parrish presents in follow-up of Bilateral lower extremity Pain, swelling and spider veins.  Please see my consultation of 9/15/2023 for details.    Since I have seen her, she had an episode of acute pain in her left knee with pain extending down into her left posterior calf with what she believed was some calf swelling.  An MRI revealed a left medial meniscal tear.  She will be seeing an orthopedic surgeon in this regard.    She returned on 10/13/2023 for a bilateral lower extremity venous competency study, the results of which were discussed on today's video visit.    Physical Exam  General: Pleasant female in no acute distress.  Blood pressure 115/78, pulse 76  Extremities:  Right lower extremity: No significant varicose veins.  There is a " small, ecchymotic area on the posterior lateral right proximal calf.  A few, small, scattered telangiectasias tissue deposition on her distal medial thighs, proximal medial calves and ankles with an ankle cutoff sign (mild) suggestive of mild lipedema.     Left lower extremity: No varicose veins.  Ecchymotic area in the posterior lateral left calf.  No significant reticular veins.  A few scattered telangiectasias.  Tissue deposition on the distal medial thigh, proximal medial calf and a mild ankle cutoff sign sugges    Ultrasound:  Name: Shital Parrish     Patient ID: [id number]   Date: 2023      : 1970   Sex: female      Examined by: SUZANNA Dennison RVT   Age:  52 year old     Reading MD: DAT     INDICATION: Vascular rupture.     EXAM TYPE   BILATERAL LOWER EXTREMITY VENOUS DUPLEX FOR VENOUS INSUFFICIENCY   TECHNICAL SUMMARY     A duplex ultrasound study using color flow was performed, to evaluate the bilateral lower extremity veins for valvular incompetence with the patient in a steep reversed trendelenberg.     RIGHT:     The deep veins demonstrate phasic flow, compress, and respond to augmentations.  There is no reflux or DVT.      The GSV demonstrates phasic flow, compresses and responds to augmentations from the saphenofemoral junction to the ankle with no evidence of reflux or thrombus. The greater saphenous vein measures 8.7 mm at the saphenofemoral junction, 6.7 mm in the proximal thigh, and 4.5 mm at the knee.     The AASV is competent ( 4.1 mm) draining into the saphenofemoral junction.     The Giacomini vein is competent ( 0.9 mm) communicating with the small saphenous vein at the knee level.     The SSV demonstrates phasic flow, compresses, and responds to augmentations from the popliteal space to the ankle.  No reflux or thrombus is seen. The saphenopopliteal junction is competent (2.5 mm).     Perforators: There is no evidence of incompetent  veins at any level.     LEFT:      The deep veins demonstrate phasic flow, compress, and respond to augmentations.  There is no reflux or DVT.      The GSV demonstrates phasic flow, compresses, and responds to augmentations from the saphenofemoral junction to the ankle with no evidence of reflux or thrombus. The greater saphenous vein measures 8.7 mm at the saphenofemoral junction, 6.9 mm in the proximal thigh, and 4.6 mm at the knee.     The AASV is competent ( 1.8 mm) draining into the saphenofemoral junction.     The Giacomini vein is competent ( 0.9 mm) communicating with the small saphenous vein at the knee level.     The SSV demonstrates phasic flow, compresses, and responds to augmentations from the popliteal space to the ankle.  No reflux or thrombus is seen. The saphenopopliteal junction is competent ( 3.6 mm).     Perforators: There is no evidence of incompetent  veins at any level.     FINAL SUMMARY:     Incompetence Criteria:   1.  No evidence of deep venous thrombosis in the right lower extremity.  2.  Right lower extremity deep venous system, great saphenous vein, anterior accessory saphenous vein, Giacomini vein and small saphenous veins are competent.  3.  No evidence of deep venous thrombosis in the left lower extremity.  4. Left lower extremity deep venous system, great saphenous vein, anterior accessory saphenous vein, Giacomini vein and small saphenous veins are competent.    Greater than 500 milliseconds of reflux measured in the superficial and perforating veins and greater than 1000 milliseconds of reflux measured in the deep veins.      RON SOLER M.D., FACS, RPVI      Assessment:  No venous insufficiency contributing to her symptoms.  I believe she has physical findings consistent with lipedema.  Measures to treat her lipedema including L form of diosmin, edema wear, compression and increasing her physical activities have been instituted.    The more pressing concern at the time is with her left meniscus  tear and left knee pain.  I recommend that she see an orthopedic surgeon in this regard.    She inquired about follow-up with me from the standpoint of treatment recommendations.  I will have her return in about 3 months.    Plan:  3-month follow-up, in office visit    Evelio Simons MD    Dictated using Dragon voice recognition software which may result in transcription errors      Again, thank you for allowing me to participate in the care of your patient.        Sincerely,        Evelio Simons MD

## 2023-10-27 ENCOUNTER — TELEPHONE (OUTPATIENT)
Dept: FAMILY MEDICINE | Facility: CLINIC | Age: 53
End: 2023-10-27
Payer: COMMERCIAL

## 2023-10-27 NOTE — TELEPHONE ENCOUNTER
She can schedule VV on Friday 11-3-23, 11:40  Please block remaining double book VV slot for her      thx

## 2023-10-27 NOTE — TELEPHONE ENCOUNTER
General Call      Reason for Call:  Patient wanted to let you know that she met with Dr. Simons on 10/26/2023 and he referred her to a Orthopedic surgeon.   She would like what your opinion is. If she needs to be seen by you again she is willing to come in and be seen.    What are your questions or concerns:  I called [facility] medical records and they are going to be sending over the faxed images to my fax number: [phone] will give to you when I receive them to look over    Date of last appointment with provider: 10/20/2023    Could we send this information to you in [employer] or would you prefer to receive a phone call?:   Patient would prefer a phone call   Okay to leave a detailed message?: Yes at Home number on file [phone] (home)

## 2023-11-03 ENCOUNTER — VIRTUAL VISIT (OUTPATIENT)
Dept: FAMILY MEDICINE | Facility: CLINIC | Age: 53
End: 2023-11-03
Payer: COMMERCIAL

## 2023-11-03 DIAGNOSIS — I78.1 TELANGIECTASIA: ICD-10-CM

## 2023-11-03 DIAGNOSIS — S83.105D DISLOCATION OF LEFT KNEE WITH MEDIAL MENISCUS TEAR, SUBSEQUENT ENCOUNTER: ICD-10-CM

## 2023-11-03 DIAGNOSIS — S83.242D DISLOCATION OF LEFT KNEE WITH MEDIAL MENISCUS TEAR, SUBSEQUENT ENCOUNTER: ICD-10-CM

## 2023-11-03 DIAGNOSIS — E78.00 PURE HYPERCHOLESTEROLEMIA: Primary | ICD-10-CM

## 2023-11-03 PROCEDURE — 99214 OFFICE O/P EST MOD 30 MIN: CPT | Mod: VID | Performed by: FAMILY MEDICINE

## 2023-11-03 NOTE — PROGRESS NOTES
Shital is a 52 year old who is being evaluated via a billable video visit.      How would you like to obtain your AVS? MyChart  If the video visit is dropped, the invitation should be resent by: Text to cell phone: [phone]  Will anyone else be joining your video visit? No          Assessment & Plan     Pure hypercholesterolemia      Telangiectasia  Was seen by vascular surgeon and recommended conservative management  Will continue monitoring     Dislocation of left knee with medial meniscus tear, subsequent encounter  Has been seen at [facility], and was recommended surgery for repair, pt wants second opinion from  ortho(Dr Malone), she is supposed to do another MRI, which results will be sent to . It should attached to the file for Dr Malone review   - Orthopedic  Referral; Future             FUTURE APPOINTMENTS:       - Follow-up visit in 1-2 weeks     Tim Hill MD  [facility]   Shital is a 52 year old, presenting for the following health issues:  Follow Up        11/3/2023     9:01 AM   Additional Questions   Roomed by Radhaly   Accompanied by Not applicable, by themselves       HPI           Review of Systems   Constitutional, HEENT, cardiovascular, pulmonary, GI, , musculoskeletal, neuro, skin, endocrine and psych systems are negative, except as otherwise noted.      Objective           Vitals:  No vitals were obtained today due to virtual visit.    Physical Exam   GENERAL: Healthy, alert and no distress  EYES: Eyes grossly normal to inspection.  No discharge or erythema, or obvious scleral/conjunctival abnormalities.  RESP: No audible wheeze, cough, or visible cyanosis.  No visible retractions or increased work of breathing.    SKIN: Visible skin clear. No significant rash, abnormal pigmentation or lesions.  NEURO: Cranial nerves grossly intact.  Mentation and speech appropriate for age.  PSYCH: Mentation appears normal, affect normal/bright,  judgement and insight intact, normal speech and appearance well-groomed.                Video-Visit Details    Type of service:  Video Visit     Originating Location (pt. Location): Home    Distant Location (provider location):  On-site  Platform used for Video Visit: vWise    Video start:12:10  Video finish:12:43

## 2023-11-04 ENCOUNTER — TRANSFERRED RECORDS (OUTPATIENT)
Dept: HEALTH INFORMATION MANAGEMENT | Facility: CLINIC | Age: 53
End: 2023-11-04

## 2023-11-09 ENCOUNTER — TRANSFERRED RECORDS (OUTPATIENT)
Dept: HEALTH INFORMATION MANAGEMENT | Facility: CLINIC | Age: 53
End: 2023-11-09
Payer: COMMERCIAL

## 2023-11-16 ENCOUNTER — TRANSFERRED RECORDS (OUTPATIENT)
Dept: HEALTH INFORMATION MANAGEMENT | Facility: CLINIC | Age: 53
End: 2023-11-16
Payer: COMMERCIAL

## 2023-11-24 ENCOUNTER — NURSE TRIAGE (OUTPATIENT)
Dept: FAMILY MEDICINE | Facility: CLINIC | Age: 53
End: 2023-11-24

## 2023-11-24 DIAGNOSIS — Z79.890 POST-MENOPAUSE ON HRT (HORMONE REPLACEMENT THERAPY): ICD-10-CM

## 2023-11-24 DIAGNOSIS — N95.1 SYMPTOMS, SUCH AS FLUSHING, SLEEPLESSNESS, HEADACHE, LACK OF CONCENTRATION, ASSOCIATED WITH THE MENOPAUSE: ICD-10-CM

## 2023-11-24 DIAGNOSIS — U07.1 INFECTION DUE TO COVID-19 VIRUS VARIANT OF CONCERN: Primary | ICD-10-CM

## 2023-11-24 RX ORDER — ESTRADIOL 0.07 MG/D
FILM, EXTENDED RELEASE TRANSDERMAL
Qty: 8 PATCH | Refills: 2 | OUTPATIENT
Start: 2023-11-24

## 2023-11-24 NOTE — TELEPHONE ENCOUNTER
"Requested Prescriptions   Pending Prescriptions Disp Refills    estradiol (VIVELLE-DOT) 0.075 MG/24HR BIW patch [Pharmacy Med Name: ESTRADIOL 0.075 MG PATCH(2/WK)] 8 patch 2     Sig: APPLY 1 PATCH TWICE WEEKLY       Hormone Replacement Therapy Failed - 11/24/2023 12:56 AM        Failed - Recent (12 mo) or future (30 days) visit within the authorizing provider's specialty     Patient has had an office visit with the authorizing provider or a provider within the authorizing providers department within the previous 12 mos or has a future within next 30 days. See \"Patient Info\" tab in inbasket, or \"Choose Columns\" in Meds & Orders section of the refill encounter.              Passed - Blood pressure under 140/90 in past 12 months     BP Readings from Last 3 Encounters:   10/20/23 126/86   09/13/23 104/76   07/19/23 114/74                 Passed - Patient has mammogram in past 2 years on file if age 50-75        Passed - Medication is active on med list        Passed - Patient is 18 years of age or older        Passed - No active pregnancy on record        Passed - No positive pregnancy test on record in past 12 months           Last Written Prescription Date:  8/20/23  Last Fill Quantity: 24,  # refills: 0   Last office visit: 8/10/2022 ; last virtual visit: Visit date not found with prescribing provider:  Adis   Future Office Visit:  Today 11/24/23 with Dr. Arceo    Refill request to be addressed at appointment. Rx denied.  Roro Gupta RN on 11/24/2023 at 6:00 AM                      "

## 2023-11-24 NOTE — TELEPHONE ENCOUNTER
RN COVID TREATMENT VISIT  11/24/23      The patient has been triaged and does not require a higher level of care.    Shital Parrish  53 year old  Current weight? 183lb    Has the patient been seen by a primary care provider at an [facility] or [facility] within the past two years? Yes.   Have you been in close proximity to/do you have a known exposure to a person with a confirmed case of influenza? No.     General treatment eligibility:  Date of positive COVID test (PCR or at home)?  11/24/2023    Are you or have you been hospitalized for this COVID-19 infection? No.   Have you received monoclonal antibodies or antiviral treatment for COVID-19 since this positive test? No.   Do you have any of the following conditions that place you at risk of being very sick from COVID-19?   - Age 50 years or older  Yes, patient has at least one high risk condition as noted above.     Current COVID symptoms:   - fever or chills  - cough  - headache  - sore throat  - congestion or runny nose  Yes. Patient has at least one symptom as selected.     How many days since symptoms started? 5 days or less. Established patient, 12 years or older weighing at least 88.2 lbs, who has symptoms that started in the past 5 days, has not been hospitalized nor received treatment already, and is at risk for being very sick from COVID-19.     Treatment eligibility by RN:  Are you currently pregnant or nursing? No  Do you have a clinically significant hypersensitivity to nirmatrelvir or ritonavir, or toxic epidermal necrolysis (TEN) or Marx-Jose Syndrome? No  Do you have a history of hepatitis, any hepatic impairment on the Problem List (such as Child-Emery Class C, cirrhosis, fatty liver disease, alcoholic liver disease), or was the last liver lab (hepatic panel, ALT, AST, ALK Phos, bilirubin) elevated in the past 6 months? No  Do you have any history of severe renal impairment (eGFR < 30mL/min)? No    Is patient  eligible to continue? Yes, patient meets all eligibility requirements for the RN COVID treatment (as denoted by all no responses above).     Current Outpatient Medications   Medication Sig Dispense Refill    ciclopirox (PENLAC) 8 % external solution Apply topically as needed To nails nails      clobetasol (TEMOVATE) 0.05 % external ointment APPLY TWICE DAILY TO AFFECTED AREAS ON LOWER LEGS FOR 2 WEEKS AS DIRECTED      EPIDUO FORTE 0.3-2.5 % gel Apply 0.3 Pump topically as needed      estradiol (VIVELLE-DOT) 0.075 MG/24HR BIW patch APPLY 1 PATCH TWICE WEEKLY 24 patch 0    fexofenadine (ALLEGRA) 180 MG tablet Take 180 mg by mouth daily      methimazole (TAPAZOLE) 5 MG tablet TAKE 1/2 TABLET BY MOUTH DAILY      pravastatin (PRAVACHOL) 20 MG tablet Take 1 tablet (20 mg) by mouth daily 90 tablet 3       Medications from List 1 of the standing order (on medications that exclude the use of Paxlovid) that patient is taking: NONE. Is patient taking Luciana's Wort? No  Is patient taking Luciana's Wort or any meds from List 1? No.   Medications from List 2 of the standing order (on meds that provider needs to adjust) that patient is taking: NONE. Is patient on any of the meds from List 2? No.   Medications from List 3 of standing order (on meds that a RN needs to adjust) that patient is taking: NONE. Is patient on any meds from List 3? No.     Paxlovid has an approximate 90% reduction in hospitalization. Paxlovid can possibly cause altered sense of taste, diarrhea (loose, watery stools), high blood pressure, muscle aches.     Would patient like a Paxlovid prescription?   Yes.   Lab Results   Component Value Date    GFRESTIMATED >90 07/19/2023       Was last eGFR reduced? No, eGFR 60 or greater/ No Result on record. Patient can receive the normal renal function dose. Paxlovid Rx sent to [place] pharmacy   [facility]    Temporary change to home medications: None    All medication adjustments (holds, etc) were discussed with the  patient and patient was asked to repeat back (teachback) their med adjustment.  Did patient understand med adjustment? No medication adjustments needed.         Reviewed the following instructions with the patient:    Paxlovid (nimatrelvir and ritonavir)    How it works  Two medicines (nirmatrelvir and ritonavir) are taken together. They stop the virus from growing. Less amount of virus is easier for your body to fight.    How to take  Medicine comes in a daily container with both medicine tablets. Take by mouth twice daily (once in the morning, once at night) for 5 days.  The number of tablets to take varies by patient.  Don't chew or break capsules. Swallow whole.    When to take  Take as soon as possible after positive COVID-19 test result, and within 5 days of your first symptoms.    Possible side effects  Can cause altered sense of taste, diarrhea (loose, watery stools), high blood pressure, muscle aches.    Saad Lindsey RN         Reason for Disposition   COVID-19 diagnosed by positive lab test (e.g., PCR, rapid self-test kit) and mild symptoms (e.g., cough, fever, others) and no complications or SOB    Additional Information   Negative: SEVERE difficulty breathing (e.g., struggling for each breath, speaks in single words)   Negative: Difficult to awaken or acting confused (e.g., disoriented, slurred speech)   Negative: Bluish (or gray) lips or face now   Negative: Shock suspected (e.g., cold/pale/clammy skin, too weak to stand, low BP, rapid pulse)   Negative: Sounds like a life-threatening emergency to the triager   Negative: SEVERE or constant chest pain or pressure  (Exception: Mild central chest pain, present only when coughing.)   Negative: MODERATE difficulty breathing (e.g., speaks in phrases, SOB even at rest, pulse 100-120)   Negative: Headache and stiff neck (can't touch chin to chest)   Negative: Oxygen level (e.g., pulse oximetry) 90% or lower   Negative: Chest pain or pressure  (Exception:  MILD central chest pain, present only when coughing.)   Negative: Drinking very little and dehydration suspected (e.g., no urine > 12 hours, very dry mouth, very lightheaded)   Negative: Patient sounds very sick or weak to the triager   Negative: MILD difficulty breathing (e.g., minimal/no SOB at rest, SOB with walking, pulse <100)   Negative: Fever > 103 F (39.4 C)   Negative: Fever > 101 F (38.3 C) and over 60 years of age   Negative: Fever > 100.0 F (37.8 C) and bedridden (e.g., CVA, chronic illness, recovering from surgery)   Negative: HIGH RISK patient (e.g., weak immune system, age > 64 years, obesity with BMI of 30 or higher, pregnant, chronic lung disease or other chronic medical condition) and COVID symptoms (e.g., cough, fever)  (Exceptions: Already seen by doctor or NP/PA and no new or worsening symptoms.)   Negative: HIGH RISK patient and influenza is widespread in the community and ONE OR MORE respiratory symptoms: cough, sore throat, runny or stuffy nose   Negative: HIGH RISK patient and influenza exposure within the last 7 days and ONE OR MORE respiratory symptoms: cough, sore throat, runny or stuffy nose   Negative: Oxygen level (e.g., pulse oximetry) 91 to 94%   Negative: COVID-19 infection suspected by caller or triager and mild symptoms (cough, fever, or others) and negative COVID-19 rapid test   Negative: Fever present > 3 days (72 hours)   Negative: Fever returns after gone for over 24 hours and symptoms worse or not improved   Negative: Continuous (nonstop) coughing interferes with work or school and no improvement using cough treatment per Care Advice   Negative: Cough present > 3 weeks    Protocols used: Coronavirus (COVID-19) Diagnosed or [id number]

## 2023-12-08 ENCOUNTER — OFFICE VISIT (OUTPATIENT)
Dept: ORTHOPEDICS | Facility: CLINIC | Age: 53
End: 2023-12-08
Attending: FAMILY MEDICINE
Payer: COMMERCIAL

## 2023-12-08 ENCOUNTER — TELEPHONE (OUTPATIENT)
Dept: FAMILY MEDICINE | Facility: CLINIC | Age: 53
End: 2023-12-08

## 2023-12-08 VITALS — HEIGHT: 64 IN | BODY MASS INDEX: 31.39 KG/M2 | WEIGHT: 183.86 LBS

## 2023-12-08 DIAGNOSIS — M17.12 OSTEOARTHROSIS, LOCALIZED, PRIMARY, KNEE, LEFT: Primary | ICD-10-CM

## 2023-12-08 DIAGNOSIS — S83.242D DISLOCATION OF LEFT KNEE WITH MEDIAL MENISCUS TEAR, SUBSEQUENT ENCOUNTER: ICD-10-CM

## 2023-12-08 DIAGNOSIS — S83.105D DISLOCATION OF LEFT KNEE WITH MEDIAL MENISCUS TEAR, SUBSEQUENT ENCOUNTER: ICD-10-CM

## 2023-12-08 PROCEDURE — 99204 OFFICE O/P NEW MOD 45 MIN: CPT | Performed by: STUDENT IN AN ORGANIZED HEALTH CARE EDUCATION/TRAINING PROGRAM

## 2023-12-08 ASSESSMENT — PAIN SCALES - GENERAL: PAINLEVEL: NO PAIN (0)

## 2023-12-08 NOTE — TELEPHONE ENCOUNTER
Patient called the clinic and requested that a message be sent to the provider.    Patient was able to see the orthopedic today and stated that the provider gave a great perspective. Patient is very thankful for the referral that was place. She was wanting PCP to know so that he could look at the appointment notes from today.    Wanda FUNEZ RN  [facility] Triage Team

## 2023-12-08 NOTE — PATIENT INSTRUCTIONS
1. Dislocation of left knee with medial meniscus tear, subsequent encounter        Physical Therapy orders have been placed with [facility].  You can call [phone]  to schedule at your convenience.     Follow up with Dr. Malone as needed.    Call my office with any questions or concerns, [phone].

## 2023-12-08 NOTE — TELEPHONE ENCOUNTER
I just reviewed Dr Malone's chart from today, and glad she has nice experience with him. Please relay the message

## 2023-12-08 NOTE — PROGRESS NOTES
[facility]  Orthopaedic Surgery Consultation by Luke Malone M.D.    Shital Parrish MRN# [id number]   Age: 53 year old YOB: 1970     Requesting physician: Tim Ramirez     Background history:  DX:  Hypothyroidism  Hypercholesteremia  GERD    TREATMENTS:  2014, laparoscopic hysterectomy           History of Present Illness:   53 year old female who presents to clinic because of chronic left knee pain.  This pain has been present for a few months without clear antecedent trauma. She describes medial sided knee pain with pressure to the medial aspect of her knee. She also has intermittent popping and stiffness especially after being seated for a while.  She denies any mechanical symptoms such as locking or buckling.  The pain does not appear to radiate.  Patient did have left leg pain for which she has seen a lumbar spine specialist and is doing physical therapy.  This pain has greatly improved as a result of this.  Patient was previously seen at [facility] and was recommended a left knee arthroscopy.  She is here for a second opinion.    To mitigate her pain patient has not tried any over-the-counter analgesics, PT, home exercise regimen, injections.    Social:   Occupation: clinical internship in counseling   Living situation: lives with   Hobbies / Sports: walking, racquetball     Smoking: No  Alcohol: No  Illicit drug use: No         Physical Exam:     EXAMINATION pertinent findings:   PSYCH: Pleasant, healthy-appearing, alert, oriented x3, cooperative. Normal mood and affect.  VITAL SIGNS: Last menstrual period 10/26/2014, not currently breastfeeding.  Reviewed nursing intake notes.   There is no height or weight on file to calculate BMI.  RESP: non labored breathing   ABD: benign, soft, non-tender, no acute peritoneal findings  SKIN: grossly normal   LYMPHATIC: grossly normal, no adenopathy, no extremity edema  NEURO: grossly normal , no motor deficits  VASCULAR:  satisfactory perfusion of all extremities   MUSCULOSKELETAL:   Alignment: Neutral  Gait: Normal    Left hip exhibits a full range of motion.  No pain upon rotations.  Lasegue's test is negative.    L knee: -0-0  . Straight leg raise +. No redness, warmth or skin changes present. Effusion No. Ligamentously stable in both ML and AP direction. Normal PF tracking without crepitus.  Rabot is negative.  Apprehension -. Meniscal provocation tests are negative.  There is recognizable tenderness to palpation over the joint line.     Left LE:   Thigh and leg compartments soft and compressible   +Quad/TA/GSC/FHL/EHL   SILT DP/SP/Sunni/Saph/Tib nerve distributions   Palpable dorsalis pedis pulse          Data:   All laboratory data reviewed  All imaging studies reviewed by me personally.    XR bilateral knees 9/26/2023:  My interpretation: Mild to moderate osteoarthritic changes medial compartment bilateral knees.  Presence of joint space narrowing, small marginal osteophytes.  Well-preserved lateral and patellofemoral compartments.    MRI right knee left 11/4/2023:  My interpretation: Degenerative medial meniscal tear.  No clear visualization of meniscal root avulsion.  Chondromalacia grade 3 with focal full-thickness chondromalacia medial compartment.  Associated bone marrow edema tibial plateau.  Relatively well-preserved lateral and patellofemoral compartments.  Intact ACL/PCL.         Assessment and Plan:   Assessment:  53-year-old female presenting with chronic left knee pain due to osteoarthritic changes and degenerative medial meniscal tear.  No clear indication of meniscal root avulsion.  Nonsurgical treatment has not yet been initiated.     Plan:  I had a long discussion with the patient regarding etiology and ongoing management options.  Reviewed surgical and nonsurgical treatments.  The non-surgical options include activity modification, pain medication, PT, medial  bracing, weight reduction and  injection therapy.  At this point I do not believe that in the setting of a nonmechanical meniscal tear and significant osteoarthritic changes of the medial compartment surgical intervention in form of partial medial meniscectomy or medial meniscal root repair would be beneficial.  It would be my recommendation to initiate and pursue physical therapy, NSAIDs as needed, weight reduction and medial  brace as well as future possible intra-articular injections.  Patient understands and agrees to the treatment plan as set forth.  A referral to physical therapy and dietitian was provided.  We will follow-up with patient on as-needed basis.    Thank you for your referral.    Luke Malone MD, PhD     Adult Reconstruction  [facility] Department of Orthopaedic Surgery    This note was created using dictation software and may contain errors.  Please contact the creator for any clarifications that are needed.    DATA for DOCUMENTATION:         Past Medical History:     Patient Active Problem List   Diagnosis    Hyperthyroidism    S/P abdominal supracervical subtotal hysterectomy    Elevated fasting glucose    Pure hypercholesterolemia    Post-menopause on HRT (hormone replacement therapy)    Symptoms, such as flushing, sleeplessness, headache, lack of concentration, associated with the menopause     Past Medical History:   Diagnosis Date    Edema 10/24/2013    Gastro-oesophageal reflux disease     Heavy periods 10/24/2013    Hyperthyroidism 9/26/2014    borderline-treated with a med for 6 months in [place] and then numbers improved. had a low TSH here and referred to [facility]. numbers there were borderline but normal and no antibodies. rec. 12/14-I123 uptake was increased so Graves dz dx'd. may do methimazole if repeat TFTs show hyperthyroid    Leiomyoma of uterus, unspecified 9/26/2014    Nipple discharge 09/14    right side only. neg mammo and right breast u/s. MRI pending    PONV  (postoperative nausea and vomiting)     Post-menopause on HRT (hormone replacement therapy) 2021    Pure hypercholesterolemia 2021    Varicose veins of lower extremities with other complications 10/24/2013       Also see scanned health assessment forms.       Past Surgical History:     Past Surgical History:   Procedure Laterality Date    APPENDECTOMY       SECTION      CHOLECYSTECTOMY, LAPOROSCOPIC  2012    Cholecystectomy, Laparoscopic    COLONOSCOPY N/A 2017    Procedure: COLONOSCOPY;  COLONOSCOPY;  Surgeon: Shannon Noel MD;  Location:  GI    COLONOSCOPY N/A 2019    Procedure: COLONOSCOPY;  Surgeon: Shannon Noel MD;  Location:  GI    HYSTERECTOMY SUPRACERVICAL N/A 2014    Procedure: HYSTERECTOMY SUPRACERVICAL;  Surgeon: Shauna Arceo MD;  Location:  OR [facility] and [facility] done by Adis/ Leora. started as laparoscopic but converted to open due to adhesions    LAPAROSCOPIC CYSTECTOMY OVARIAN (ONCOLOGY)      right. benign cyst. partial oopherectomy in [facility]    LAPAROSCOPIC SALPINGO-OOPHORECTOMY  14    MYOMECTOMY UTERUS  2008    hysteroscopic    TONSILLECTOMY  1979            Social History:     Social History     Socioeconomic History    Marital status:      Spouse name: Marianna    Number of children: 2    Years of education: 16    Highest education level: Not on file   Occupational History    Occupation: Program Leader     Comment: [employer]     Employer: [employer]    Tobacco Use    Smoking status: Former     Packs/day: 0.00     Years: 0.00     Additional pack years: 0.00     Total pack years: 0.00     Types: Cigarettes    Smokeless tobacco: Never    Tobacco comments:     Quit 31+ years ago.   Substance and Sexual Activity    Alcohol use: Yes     Comment: Social Drinking    Drug use: No    Sexual activity: Yes     Partners: Male     Birth control/protection: None     Comment: hysterectomy   Other Topics Concern      Service Not Asked    Blood Transfusions No    Caffeine Concern Not Asked    Occupational Exposure Not Asked    Hobby Hazards Not Asked    Sleep Concern Not Asked    Stress Concern Not Asked    Weight Concern Not Asked    Special Diet No    Back Care Not Asked    Exercise No    Bike Helmet Not Asked     Comment: NA    Seat Belt Yes    Self-Exams Not Asked    Parent/sibling w/ CABG, MI or angioplasty before 65F 55M? No   Social History Narrative    The patient was born in [facility]. She eats fruits and vegetables every day. She takes calcium supplement and vitamin D.        No advanced care directives on file         Social Determinants of Health     Financial Resource Strain: Low Risk  (10/18/2023)    Financial Resource Strain     Within the past 12 months, have you or your family members you live with been unable to get utilities (heat, electricity) when it was really needed?: No   Food Insecurity: Low Risk  (10/18/2023)    Food Insecurity     Within the past 12 months, did you worry that your food would run out before you got money to buy more?: No     Within the past 12 months, did the food you bought just not last and you didn t have money to get more?: No   Transportation Needs: Low Risk  (10/18/2023)    Transportation Needs     Within the past 12 months, has lack of transportation kept you from medical appointments, getting your medicines, non-medical meetings or appointments, work, or from getting things that you need?: No   Physical Activity: Not on file   Stress: Not on file   Social Connections: Not on file   Interpersonal Safety: Low Risk  (10/20/2023)    Interpersonal Safety     Do you feel physically and emotionally safe where you currently live?: Yes     Within the past 12 months, have you been hit, slapped, kicked or otherwise physically hurt by someone?: No     Within the past 12 months, have you been humiliated or emotionally abused in other ways by your partner or ex-partner?: No   Housing  Stability: Low Risk  (10/18/2023)    Housing Stability     Do you have housing? : Yes     Are you worried about losing your housing?: No            Family History:       Family History   Problem Relation Age of Onset    Diabetes Mother     Cancer Mother         melanoma    Colon Polyps Mother     Coronary Artery Disease Father     No Known Problems Sister     Diabetes Maternal Grandmother     Breast Cancer Maternal Aunt     Thyroid Disease Cousin     No Known Problems Brother     No Known Problems Maternal Grandfather     No Known Problems Paternal Grandmother     Breast Cancer Other         Maternal Sister    Asthma No family hx of             Medications:     Current Outpatient Medications   Medication Sig    ciclopirox (PENLAC) 8 % external solution Apply topically as needed To nails nails    clobetasol (TEMOVATE) 0.05 % external ointment APPLY TWICE DAILY TO AFFECTED AREAS ON LOWER LEGS FOR 2 WEEKS AS DIRECTED    EPIDUO FORTE 0.3-2.5 % gel Apply 0.3 Pump topically as needed    estradiol (VIVELLE-DOT) 0.075 MG/24HR BIW patch APPLY 1 PATCH TWICE WEEKLY    fexofenadine (ALLEGRA) 180 MG tablet Take 180 mg by mouth daily    methimazole (TAPAZOLE) 5 MG tablet TAKE 1/2 TABLET BY MOUTH DAILY    pravastatin (PRAVACHOL) 20 MG tablet Take 1 tablet (20 mg) by mouth daily     No current facility-administered medications for this visit.              Review of Systems:   A comprehensive 10 point review of systems (constitutional, ENT, cardiac, peripheral vascular, lymphatic, respiratory, GI, , Musculoskeletal, skin, Neurological) was performed and found to be negative except as described in this note.     See intake form completed by patient

## 2023-12-08 NOTE — TELEPHONE ENCOUNTER
Patient Contact     Attempt # 1     Was call answered?    No  Relayed providers message.     Gisele Mendez RN

## 2023-12-08 NOTE — LETTER
12/8/2023         RE: Shital Parrish  [address]  [place] [address]        Dear Colleague,    Thank you for referring your patient, Shital Parrish, to the [facility] [place]. Please see a copy of my visit note below.        [facility]  Orthopaedic Surgery Consultation by Luke Malone M.D.    Shital Parrish MRN# [id number]   Age: 53 year old YOB: 1970     Requesting physician: Tim Ramirez     Background history:  DX:  Hypothyroidism  Hypercholesteremia  GERD    TREATMENTS:  2014, laparoscopic hysterectomy           History of Present Illness:   53 year old female who presents to clinic because of chronic left knee pain.  This pain has been present for a few months without clear antecedent trauma. She describes medial sided knee pain with pressure to the medial aspect of her knee. She also has intermittent popping and stiffness especially after being seated for a while.  She denies any mechanical symptoms such as locking or buckling.  The pain does not appear to radiate.  Patient did have left leg pain for which she has seen a lumbar spine specialist and is doing physical therapy.  This pain has greatly improved as a result of this.  Patient was previously seen at [facility] and was recommended a left knee arthroscopy.  She is here for a second opinion.    To mitigate her pain patient has not tried any over-the-counter analgesics, PT, home exercise regimen, injections.    Social:   Occupation: clinical internship in counseling   Living situation: lives with   Hobbies / Sports: walking, racquetball     Smoking: No  Alcohol: No  Illicit drug use: No         Physical Exam:     EXAMINATION pertinent findings:   PSYCH: Pleasant, healthy-appearing, alert, oriented x3, cooperative. Normal mood and affect.  VITAL SIGNS: Last menstrual period 10/26/2014, not currently breastfeeding.  Reviewed nursing intake notes.   There is no height or weight on file  to calculate BMI.  RESP: non labored breathing   ABD: benign, soft, non-tender, no acute peritoneal findings  SKIN: grossly normal   LYMPHATIC: grossly normal, no adenopathy, no extremity edema  NEURO: grossly normal , no motor deficits  VASCULAR: satisfactory perfusion of all extremities   MUSCULOSKELETAL:   Alignment: Neutral  Gait: Normal    Left hip exhibits a full range of motion.  No pain upon rotations.  Lasegue's test is negative.    L knee: -0-0  . Straight leg raise +. No redness, warmth or skin changes present. Effusion No. Ligamentously stable in both ML and AP direction. Normal PF tracking without crepitus.  Rabot is negative.  Apprehension -. Meniscal provocation tests are negative.  There is recognizable tenderness to palpation over the joint line.     Left LE:   Thigh and leg compartments soft and compressible   +Quad/TA/GSC/FHL/EHL   SILT DP/SP/Sunni/Saph/Tib nerve distributions   Palpable dorsalis pedis pulse          Data:   All laboratory data reviewed  All imaging studies reviewed by me personally.    XR bilateral knees 9/26/2023:  My interpretation: Mild to moderate osteoarthritic changes medial compartment bilateral knees.  Presence of joint space narrowing, small marginal osteophytes.  Well-preserved lateral and patellofemoral compartments.    MRI right knee left 11/4/2023:  My interpretation: Degenerative medial meniscal tear.  No clear visualization of meniscal root avulsion.  Chondromalacia grade 3 with focal full-thickness chondromalacia medial compartment.  Associated bone marrow edema tibial plateau.  Relatively well-preserved lateral and patellofemoral compartments.  Intact ACL/PCL.         Assessment and Plan:   Assessment:  53-year-old female presenting with chronic left knee pain due to osteoarthritic changes and degenerative medial meniscal tear.  No clear indication of meniscal root avulsion.  Nonsurgical treatment has not yet been initiated.     Plan:  I had a long  discussion with the patient regarding etiology and ongoing management options.  Reviewed surgical and nonsurgical treatments.  The non-surgical options include activity modification, pain medication, PT, medial  bracing, weight reduction and injection therapy.  At this point I do not believe that in the setting of a nonmechanical meniscal tear and significant osteoarthritic changes of the medial compartment surgical intervention in form of partial medial meniscectomy or medial meniscal root repair would be beneficial.  It would be my recommendation to initiate and pursue physical therapy, NSAIDs as needed, weight reduction and medial  brace as well as future possible intra-articular injections.  Patient understands and agrees to the treatment plan as set forth.  A referral to physical therapy and dietitian was provided.  We will follow-up with patient on as-needed basis.    Thank you for your referral.    Luke Malone MD, PhD     Adult Reconstruction  [facility] Department of Orthopaedic Surgery    This note was created using dictation software and may contain errors.  Please contact the creator for any clarifications that are needed.    DATA for DOCUMENTATION:         Past Medical History:     Patient Active Problem List   Diagnosis     Hyperthyroidism     S/P abdominal supracervical subtotal hysterectomy     Elevated fasting glucose     Pure hypercholesterolemia     Post-menopause on HRT (hormone replacement therapy)     Symptoms, such as flushing, sleeplessness, headache, lack of concentration, associated with the menopause     Past Medical History:   Diagnosis Date     Edema 10/24/2013     Gastro-oesophageal reflux disease      Heavy periods 10/24/2013     Hyperthyroidism 9/26/2014    borderline-treated with a med for 6 months in [facility] and then numbers improved. had a low TSH here and referred to [facility]. numbers there were borderline but normal and no  antibodies. rec. -I123 uptake was increased so Graves dz dx'd. may do methimazole if repeat TFTs show hyperthyroid     Leiomyoma of uterus, unspecified 2014     Nipple discharge     right side only. neg mammo and right breast u/s. MRI pending     PONV (postoperative nausea and vomiting)      Post-menopause on HRT (hormone replacement therapy) 2021     Pure hypercholesterolemia 2021     Varicose veins of lower extremities with other complications 10/24/2013       Also see scanned health assessment forms.       Past Surgical History:     Past Surgical History:   Procedure Laterality Date     APPENDECTOMY        SECTION       CHOLECYSTECTOMY, LAPOROSCOPIC  2012    Cholecystectomy, Laparoscopic     COLONOSCOPY N/A 2017    Procedure: COLONOSCOPY;  COLONOSCOPY;  Surgeon: Shannon Noel MD;  Location:  GI     COLONOSCOPY N/A 2019    Procedure: COLONOSCOPY;  Surgeon: Shannon Noel MD;  Location:  GI     HYSTERECTOMY SUPRACERVICAL N/A 2014    Procedure: HYSTERECTOMY SUPRACERVICAL;  Surgeon: Shauna Arceo MD;  Location:  OR [facility] and LSO done by Adis/ [facility]ora. started as laparoscopic but converted to open due to adhesions     LAPAROSCOPIC CYSTECTOMY OVARIAN (ONCOLOGY)      right. benign cyst. partial oopherectomy in [facility]     LAPAROSCOPIC SALPINGO-OOPHORECTOMY  14     MYOMECTOMY UTERUS  2008    hysteroscopic     TONSILLECTOMY              Social History:     Social History     Socioeconomic History     Marital status:      Spouse name: Marianna     Number of children: 2     Years of education: 16     Highest education level: Not on file   Occupational History     Occupation: Program Leader     Comment: [employer]     Employer: [employer]    Tobacco Use     Smoking status: Former     Packs/day: 0.00     Years: 0.00     Additional pack years: 0.00     Total pack years: 0.00     Types: Cigarettes     Smokeless tobacco:  Never     Tobacco comments:     Quit 31+ years ago.   Substance and Sexual Activity     Alcohol use: Yes     Comment: Social Drinking     Drug use: No     Sexual activity: Yes     Partners: Male     Birth control/protection: None     Comment: hysterectomy   Other Topics Concern      Service Not Asked     Blood Transfusions No     Caffeine Concern Not Asked     Occupational Exposure Not Asked     Hobby Hazards Not Asked     Sleep Concern Not Asked     Stress Concern Not Asked     Weight Concern Not Asked     Special Diet No     Back Care Not Asked     Exercise No     Bike Helmet Not Asked     Comment: NA     Seat Belt Yes     Self-Exams Not Asked     Parent/sibling w/ CABG, MI or angioplasty before 65F 55M? No   Social History Narrative    The patient was born in [facility]. She eats fruits and vegetables every day. She takes calcium supplement and vitamin D.        No advanced care directives on file         Social Determinants of Health     Financial Resource Strain: Low Risk  (10/18/2023)    Financial Resource Strain      Within the past 12 months, have you or your family members you live with been unable to get utilities (heat, electricity) when it was really needed?: No   Food Insecurity: Low Risk  (10/18/2023)    Food Insecurity      Within the past 12 months, did you worry that your food would run out before you got money to buy more?: No      Within the past 12 months, did the food you bought just not last and you didn t have money to get more?: No   Transportation Needs: Low Risk  (10/18/2023)    Transportation Needs      Within the past 12 months, has lack of transportation kept you from medical appointments, getting your medicines, non-medical meetings or appointments, work, or from getting things that you need?: No   Physical Activity: Not on file   Stress: Not on file   Social Connections: Not on file   Interpersonal Safety: Low Risk  (10/20/2023)    Interpersonal Safety      Do you feel  physically and emotionally safe where you currently live?: Yes      Within the past 12 months, have you been hit, slapped, kicked or otherwise physically hurt by someone?: No      Within the past 12 months, have you been humiliated or emotionally abused in other ways by your partner or ex-partner?: No   Housing Stability: Low Risk  (10/18/2023)    Housing Stability      Do you have housing? : Yes      Are you worried about losing your housing?: No            Family History:       Family History   Problem Relation Age of Onset     Diabetes Mother      Cancer Mother         melanoma     Colon Polyps Mother      Coronary Artery Disease Father      No Known Problems Sister      Diabetes Maternal Grandmother      Breast Cancer Maternal Aunt      Thyroid Disease Cousin      No Known Problems Brother      No Known Problems Maternal Grandfather      No Known Problems Paternal Grandmother      Breast Cancer Other         Maternal Sister     Asthma No family hx of             Medications:     Current Outpatient Medications   Medication Sig     ciclopirox (PENLAC) 8 % external solution Apply topically as needed To nails nails     clobetasol (TEMOVATE) 0.05 % external ointment APPLY TWICE DAILY TO AFFECTED AREAS ON LOWER LEGS FOR 2 WEEKS AS DIRECTED     EPIDUO FORTE 0.3-2.5 % gel Apply 0.3 Pump topically as needed     estradiol (VIVELLE-DOT) 0.075 MG/24HR BIW patch APPLY 1 PATCH TWICE WEEKLY     fexofenadine (ALLEGRA) 180 MG tablet Take 180 mg by mouth daily     methimazole (TAPAZOLE) 5 MG tablet TAKE 1/2 TABLET BY MOUTH DAILY     pravastatin (PRAVACHOL) 20 MG tablet Take 1 tablet (20 mg) by mouth daily     No current facility-administered medications for this visit.              Review of Systems:   A comprehensive 10 point review of systems (constitutional, ENT, cardiac, peripheral vascular, lymphatic, respiratory, GI, , Musculoskeletal, skin, Neurological) was performed and found to be negative except as described in this  note.     See intake form completed by patient        Again, thank you for allowing me to participate in the care of your patient.        Sincerely,        Luke Malone MD

## 2023-12-26 ENCOUNTER — THERAPY VISIT (OUTPATIENT)
Dept: PHYSICAL THERAPY | Facility: CLINIC | Age: 53
End: 2023-12-26
Attending: STUDENT IN AN ORGANIZED HEALTH CARE EDUCATION/TRAINING PROGRAM
Payer: COMMERCIAL

## 2023-12-26 ENCOUNTER — LAB (OUTPATIENT)
Dept: LAB | Facility: CLINIC | Age: 53
End: 2023-12-26
Payer: COMMERCIAL

## 2023-12-26 DIAGNOSIS — M25.562 LEFT KNEE PAIN: Primary | ICD-10-CM

## 2023-12-26 DIAGNOSIS — E05.90 HYPERTHYROIDISM: ICD-10-CM

## 2023-12-26 LAB
T3FREE SERPL-MCNC: 3.2 PG/ML (ref 2–4.4)
T4 FREE SERPL-MCNC: 1.57 NG/DL (ref 0.9–1.7)
TSH SERPL DL<=0.005 MIU/L-ACNC: 0.69 UIU/ML (ref 0.3–4.2)

## 2023-12-26 PROCEDURE — 97530 THERAPEUTIC ACTIVITIES: CPT | Mod: GP

## 2023-12-26 PROCEDURE — 97161 PT EVAL LOW COMPLEX 20 MIN: CPT | Mod: GP

## 2023-12-26 PROCEDURE — 84439 ASSAY OF FREE THYROXINE: CPT

## 2023-12-26 PROCEDURE — 36415 COLL VENOUS BLD VENIPUNCTURE: CPT

## 2023-12-26 PROCEDURE — 84443 ASSAY THYROID STIM HORMONE: CPT

## 2023-12-26 PROCEDURE — 97110 THERAPEUTIC EXERCISES: CPT | Mod: GP

## 2023-12-26 PROCEDURE — 84481 FREE ASSAY (FT-3): CPT

## 2023-12-26 ASSESSMENT — ACTIVITIES OF DAILY LIVING (ADL)
KNEEL ON THE FRONT OF YOUR KNEE: ACTIVITY IS SOMEWHAT DIFFICULT
SIT WITH YOUR KNEE BENT: ACTIVITY IS MINIMALLY DIFFICULT
STIFFNESS: I HAVE THE SYMPTOM BUT IT DOES NOT AFFECT MY ACTIVITY
PAIN: I HAVE THE SYMPTOM BUT IT DOES NOT AFFECT MY ACTIVITY
GO DOWN STAIRS: ACTIVITY IS MINIMALLY DIFFICULT
HOW_WOULD_YOU_RATE_THE_CURRENT_FUNCTION_OF_YOUR_KNEE_DURING_YOUR_USUAL_DAILY_ACTIVITIES_ON_A_SCALE_FROM_0_TO_100_WITH_100_BEING_YOUR_LEVEL_OF_KNEE_FUNCTION_PRIOR_TO_YOUR_INJURY_AND_0_BEING_THE_INABILITY_TO_PERFORM_ANY_OF_YOUR_USUAL_DAILY_ACTIVITIES?: 80
KNEE_ACTIVITY_OF_DAILY_LIVING_SCORE: 77.14
WEAKNESS: I HAVE THE SYMPTOM BUT IT DOES NOT AFFECT MY ACTIVITY
SWELLING: I DO NOT HAVE THE SYMPTOM
WALK: ACTIVITY IS MINIMALLY DIFFICULT
AS_A_RESULT_OF_YOUR_KNEE_INJURY,_HOW_WOULD_YOU_RATE_YOUR_CURRENT_LEVEL_OF_DAILY_ACTIVITY?: NEARLY NORMAL
GIVING WAY, BUCKLING OR SHIFTING OF KNEE: I DO NOT HAVE THE SYMPTOM
HOW_WOULD_YOU_RATE_THE_OVERALL_FUNCTION_OF_YOUR_KNEE_DURING_YOUR_USUAL_DAILY_ACTIVITIES?: NEARLY NORMAL
GO UP STAIRS: ACTIVITY IS MINIMALLY DIFFICULT
STAND: ACTIVITY IS MINIMALLY DIFFICULT
KNEE_ACTIVITY_OF_DAILY_LIVING_SUM: 54
RISE FROM A CHAIR: ACTIVITY IS VERY DIFFICULT
SQUAT: ACTIVITY IS MINIMALLY DIFFICULT
RAW_SCORE: 54
LIMPING: I HAVE THE SYMPTOM BUT IT DOES NOT AFFECT MY ACTIVITY

## 2023-12-26 NOTE — PROGRESS NOTES
PHYSICAL THERAPY EVALUATION  Type of Visit: Evaluation    See electronic medical record for Abuse and Falls Screening details.    Subjective       Presenting condition or subjective complaint:    Patient reports to outpatient physical therapy with L knee pain that started in September 2023. It started as pain in the back of the knee and one day and felt an intense pain and she fell down and could not walk. That lasted for 24 hours and she was told it might have been cyst that ruptured but her symptoms did not match this. She then took 5 days of cortisone. She was able to walk with less pain and the pain was more global in the knee (not as much posterior). She has stiffness going up and down stairs. She feels she is limping the longer she walks. This depends on the day. She also notices medial L knee pain when she sleeps on her sides. She feels like she has to rub out the tension in her L thigh because of how tense it feels.     She also started feeling numbness in her L leg. She then had an MRI of her LB which showed a problem at L4-5 which they also recommended PT for. She notices it most when she goes to bed at night -she has not been sleeping well for this. She had a cortisone injection on the L side of low back 2 weeks ago. This felt better initially, but now not as much. She had an instance of numbness on the L LE about 4 years where she had facet injections that helped calm down the symptoms at that time.     Denies any knee swelling with this.     Goals: reduce numbness in L LE, reduce stiffness with stairs, reduce pain with prolonged walking    Date of onset: 12/08/23 (date of order)    Relevant medical history:     Past Medical History:   Diagnosis Date    Edema 10/24/2013    Gastro-oesophageal reflux disease     Heavy periods 10/24/2013    Hyperthyroidism 9/26/2014    border[facility]. had a low TSH here and referred to [facility]. numbers there were  borderline but normal and no antibodies. rec. -I123 uptake was increased so Graves dz dx'd. may do methimazole if repeat TFTs show hyperthyroid    Leiomyoma of uterus, unspecified 2014    Nipple discharge     right side only. neg mammo and right breast u/s. MRI pending    PONV (postoperative nausea and vomiting)     Post-menopause on HRT (hormone replacement therapy) 2021    Pure hypercholesterolemia 2021    Varicose veins of lower extremities with other complications 10/24/2013     Dates & types of surgery:    Past Surgical History:   Procedure Laterality Date    APPENDECTOMY       SECTION      CHOLECYSTECTOMY, LAPOROSCOPIC  2012    Cholecystectomy, Laparoscopic    COLONOSCOPY N/A 2017    Procedure: COLONOSCOPY;  COLONOSCOPY;  Surgeon: Shannon Noel MD;  Location:  GI    COLONOSCOPY N/A 2019    Procedure: COLONOSCOPY;  Surgeon: Shannon Noel MD;  Location:  GI    HYSTERECTOMY SUPRACERVICAL N/A 2014    Procedure: HYSTERECTOMY SUPRACERVICAL;  Surgeon: Shauna Arceo MD;  Location:  OR [facility] and LSO done by Ad[facility]/ [facility]. started as laparoscopic but converted to open due to adhesions    LAPAROSCOPIC CYSTECTOMY OVARIAN (ONCOLOGY)      right. benign cyst. partial oopherectomy in [facility]    LAPAROSCOPIC SALPINGO-OOPHORECTOMY  14    MYOMECTOMY UTERUS  2008    hysteroscopic    TONSILLECTOMY       Prior diagnostic imaging/testing results:       Prior therapy history for the same diagnosis, illness or injury:        Prior Level of Function  Transfers: Independent  Ambulation: Independent  ADL: Independent  IADL: Driving, Finances, Housekeeping, Laundry, Meal preparation, Medication management, Work, Yard work    Living Environment  Social support:     Type of home:     Stairs to enter the home:         Ramp:     Stairs inside the home:         Help at home:    Equipment owned:       Employment:      Hobbies/Interests:      Patient  goals for therapy:      Pain assessment: Pain present     Objective   KNEE EVALUATION  PAIN: pain present in L knee (more of a stiffness vs pain at present) and some numbness in L LE (none reproducible at present)  INTEGUMENTARY (edema, incisions):  not assessed, no observable edema L knee  POSTURE: WFL  GAIT:  Weightbearing Status: WBAT  Assistive Device(s): None  Gait Deviations:  decreased heel strike on L side with VC to walk with increased speed  BALANCE/PROPRIOCEPTION:  not assessed  ROM:  R knee: 0-132 deg, L knee: 0-131 deg (no pain with OP but patient reports it feels different from R side)  Lumbar: flexion = WNL, extension = limited from lumbar spine  STRENGTH:  hip flexion: 4- L side, 4+ R side, hip abd: 3+ L side, hip ext: 3+ L side, 4+ R side, knee ext: 4- L side, 4+ R side, knee flexion: 3+ bilaterally  Slump: - bilaterally  SPECIAL TESTS:  Binta's Test: - L side  FUNCTIONAL TESTS: Double Leg Squat: Anterior knee translation, Knee valgus, Improper use of glutes/hips, and limited depth and slight IR on L side  SLR: unable on L side, able to do good quad set but not lift from table, R side WFL  PALPATION:  L knee: slight increased tenderness along L ITB (does not reproduce the knee pain though), no joint line tenderness L knee  JOINT MOBILITY:  not assessed    Assessment & Plan   CLINICAL IMPRESSIONS  Medical Diagnosis: meniscus tear    Treatment Diagnosis: L knee pain   Impression/Assessment: Patient is a 53 year old female with L knee pain complaints.  The following significant findings have been identified: Pain, Decreased ROM/flexibility, Decreased strength, Impaired gait, Impaired muscle performance, and Decreased activity tolerance. These impairments interfere with their ability to perform self care tasks, recreational activities, household chores, household mobility, and community mobility as compared to previous level of function.     Clinical Decision Making (Complexity):  Clinical  Presentation: Stable/Uncomplicated  Clinical Presentation Rationale: based on medical and personal factors listed in PT evaluation  Clinical Decision Making (Complexity): Low complexity    PLAN OF CARE  Treatment Interventions:  Modalities: E-stim, Vasoneumatic Device  Interventions: Gait Training, Manual Therapy, Neuromuscular Re-education, Therapeutic Activity, Therapeutic Exercise, Self-Care/Home Management    Long Term Goals     PT Goal 1  Goal Identifier: ambulation  Goal Description: patient will be able to walk for 30+ min with 1/10 pain or less in L knee  Rationale: to maximize safety and independence within the home;to maximize safety and independence with performance of ADLs and functional tasks;to maximize safety and independence with self cares  Goal Progress: patient has pain with prolonged periods of walking  Target Date: 03/19/24  PT Goal 2  Goal Identifier: sleeping  Goal Description: patient will be able to sleep through the night without L knee pain or numbness in L LE  Rationale: to maximize safety and independence with performance of ADLs and functional tasks;to maximize safety and independence within the home;to maximize safety and independence with self cares  Goal Progress: patient reports difficulty sleeping due to L knee pain and L LE numbness  Target Date: 03/19/24      Frequency of Treatment:    Duration of Treatment:      Recommended Referrals to Other Professionals: n/a  Education Assessment:   Learner/Method: Patient    Risks and benefits of evaluation/treatment have been explained.   Patient/Family/caregiver agrees with Plan of Care.     Evaluation Time:     PT Eval, Low Complexity Minutes (36833): 16     Signing Clinician: Brittnee Arzola, PT

## 2023-12-27 ENCOUNTER — VIRTUAL VISIT (OUTPATIENT)
Dept: EDUCATION SERVICES | Facility: CLINIC | Age: 53
End: 2023-12-27
Attending: STUDENT IN AN ORGANIZED HEALTH CARE EDUCATION/TRAINING PROGRAM
Payer: COMMERCIAL

## 2023-12-27 DIAGNOSIS — R73.01 ELEVATED FASTING GLUCOSE: Primary | ICD-10-CM

## 2023-12-27 DIAGNOSIS — M17.12 OSTEOARTHROSIS, LOCALIZED, PRIMARY, KNEE, LEFT: ICD-10-CM

## 2023-12-27 PROCEDURE — 97802 MEDICAL NUTRITION INDIV IN: CPT | Mod: VID | Performed by: DIETITIAN, REGISTERED

## 2023-12-27 NOTE — PATIENT INSTRUCTIONS
-Recommend 1200 calorie diet: 90 gram protein, 120 gm carbohydrate.  -Follow a consistent meal/snack plan (ideally 3 meals daily).  -Protein sources: lean meats/lunch meats, eggs, low fat cheese, cottage cheese, greek yogurt, protein shakes/bars  -Aim for 5+ veggie/fruit servings daily.  -Limit a very small portion of gummy bears to 2x/week.  -Incorporate some weight training (2x/week, upper body or core for now) into exercise routine.

## 2023-12-27 NOTE — LETTER
12/27/2023         RE: Shital Parrish  [address]  [place] [address]        Dear Colleague,    Thank you for referring your patient, Shital Parrish, to the [facility]. Please see a copy of my visit note below.    MEDICAL NUTRITION THERAPY  Visit Type:Initial assessment and intervention    SUBJECTIVE:   Shital Parrish presents today for MNT and education related to weight management.   She is accompanied by self.     PATIENT STATED GOAL(S) FOR THIS VISIT: doctor recommended weight loss to help with knee pain    EATING HABITS:   Breakfast: usually skips breakfast  Lunch: 12/1pm - plain yogurt, oats OR omelette w 2-3 eggs, peppers, hudson  Dinner: 6:30/8 - chicken/beef, salad, fruit OR lentil soup,   Snacks: occasionally ama chips or gummy bears (not on a daily basis)    Beverages: black coffee, water, rarely wine    Misses meals? 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  Typically eats twice a day and may go 6+ hours between meals.  Protein intake is inadequate at ~40 grams daily.  Fruit and vegetable intake are slightly low at ~3 servings daily vergus goal of 5+.  All beverages consumed are low/no calorie.  Patient is fairly sedentary at this time due to knee issues.  Diet is high in: calories  Diet is low in: fiber, fruits, protein, and vegetables    Recommended Energy Intake: 1200 kcal for weight loss  VERBAL AND WRITTEN INFORMATION GIVEN TO SUPPORT:  Discussed: general nutrition guidelines, weight reduction, consistent meals,  labeling, delayed meals, exercise, fiber, behavior modification, and portion control.  Education Materials Provided: My Plate Planner/Choose My Plate    PLAN:   PATIENT'S BEHAVIOR CHANGE GOALS:   See Patient Instructions for patient stated behavior change goals. AVS was printed and given to patient at today's appointment.    FOLLOW UP:   Follow up with RD as needed.    BERTHA MatiasES    Time spent in minutes: 45  Encounter: Individual

## 2023-12-27 NOTE — PROGRESS NOTES
MEDICAL NUTRITION THERAPY  Visit Type:Initial assessment and intervention    SUBJECTIVE:   Shital Parrish presents today for MNT and education related to weight management.   She is accompanied by self.     PATIENT STATED GOAL(S) FOR THIS VISIT: doctor recommended weight loss to help with knee pain    EATING HABITS:   Breakfast: usually skips breakfast  Lunch: 12/1pm - plain yogurt, oats OR omelette w 2-3 eggs, peppers, hudson  Dinner: 6:30/8 - chicken/beef, salad, fruit OR lentil soup,   Snacks: occasionally ama chips or gummy bears (not on a daily basis)    Beverages: black coffee, water, rarely wine    Misses meals? 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  Typically eats twice a day and may go 6+ hours between meals.  Protein intake is inadequate at ~40 grams daily.  Fruit and vegetable intake are slightly low at ~3 servings daily vergus goal of 5+.  All beverages consumed are low/no calorie.  Patient is fairly sedentary at this time due to knee issues.  Diet is high in: calories  Diet is low in: fiber, fruits, protein, and vegetables    Recommended Energy Intake: 1200 kcal for weight loss  VERBAL AND WRITTEN INFORMATION GIVEN TO SUPPORT:  Discussed: general nutrition guidelines, weight reduction, consistent meals, labeling, delayed meals, exercise, fiber, behavior modification, and portion control.  Education Materials Provided: My Plate Planner/Choose My Plate    PLAN:   PATIENT'S BEHAVIOR CHANGE GOALS:   See Patient Instructions for patient stated behavior  change goals. AVS was printed and given to patient at today's appointment.    FOLLOW UP:   Follow up with RD as needed.    BERTHA MatiasES    Time spent in minutes: 45  Encounter: Individual

## 2024-01-03 ENCOUNTER — TELEPHONE (OUTPATIENT)
Dept: NEUROSURGERY | Facility: CLINIC | Age: 54
End: 2024-01-03
Payer: COMMERCIAL

## 2024-01-03 NOTE — TELEPHONE ENCOUNTER
Action EMAIL: [email or web address] By: (initial, date, time) RL   Action Taken Sent email request to [facility] to push imaging.

## 2024-01-09 ENCOUNTER — THERAPY VISIT (OUTPATIENT)
Dept: PHYSICAL THERAPY | Facility: CLINIC | Age: 54
End: 2024-01-09
Payer: COMMERCIAL

## 2024-01-09 DIAGNOSIS — M25.562 LEFT KNEE PAIN: Primary | ICD-10-CM

## 2024-01-09 PROCEDURE — 97110 THERAPEUTIC EXERCISES: CPT | Mod: GP

## 2024-02-22 RX ORDER — VALACYCLOVIR HYDROCHLORIDE 1 G/1
TABLET, FILM COATED ORAL
COMMUNITY
Start: 2023-11-11 | End: 2024-02-23

## 2024-02-22 NOTE — PROGRESS NOTES
HPI:  Besides routine health maintenance, she has no other health concerns today .    The patient's PCP is Dr. Tim Hill MD.      Shital is a 53 year old  female who presents for annual exam.       V.M.  Patient with hx of LASH with retained ovaries on 2014. However shortly after her lash at age 43 she began to have significant v.m sx and was started on 0.1mg vivelle patch. Sx were completely resolved on it so decided to try to wean off of it. Did well initially but then after a short time her sx returned. ERT resumed at a lower dose (0.075) and worked well    She was seen by an outside provider for left knee pain in 2023 and also had vein work up showing varicosities. For the knee  she was prescribed prednisone and counseled to stop other  medications. Subsequently stopped her ERT at that time, though not her other meds. Has been off vivelle 0.075 since  and actually doing fine.     She reports occasional daytime v.m. symptoms while off of ERT, but feels they are currently tolerable and fairly infrequent, about 2x/month where more significant/noticeable.  Denies vaginal dryness, dyspareunia.     Hyperlipidemia  Patient previously had a high LDL in the 180s and was Rx statin by her PCP.  Began dietary changes (mediterranean based and increasing home cooked meals). Most recent LDLs in 130s (138 on  and 132 on ). She is on pravachol 20mg and doesn't appear that it was changed or increased after the LDL of 132. Does have a PCP that is managing this    Superficial Phlebitis  Patient has also been seen by vein specialist for superficial bruising, ultrasound was completed w/o evidence of deep vein concerns.    Patient is still seeing Dr. Carrasco for her hyperthyroidism. Is seeing him soon for labs and recheck on her methimazole. Is only doing 2.5 mg a couple times a week and last labs were normal. Has tried to stop methimazole in the past and over time goes back to being hyperthyroid.  So at this point prefers not to be on meds but would rather just stay stable and minimize labs and appts and med adjustments by just staying on a low and infrequent dose.    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 Enjoying program and current plan is to work where she is doing her rotation at [employer]. Is working in part of their DBT program and really likes it and did want to work in trauma therapy and DBT when she learned more about it in school.  Josh is finishing her 2nd year of med school. Had a civil ceremony so  but holding off on a wedding until med school is done.  Robin doing well and working at the [employer] still          GYNECOLOGIC HISTORY:    Patient's last menstrual period was 10/26/2014.    Her current contraception method is: hysterectomy.  She  reports that she has quit smoking. Her smoking use included cigarettes. She has never used smokeless tobacco.    Patient is sexually active.  STD testing offered?  Declined  Last PHQ-9 score on record =       2024     1:35 PM   PHQ-9 SCORE   PHQ-9 Total Score 0     Last GAD7 score on record =       2024     1:35 PM   RACHEL-7 SCORE   Total Score 0     Alcohol Score = 1    HEALTH MAINTENANCE:  Cholesterol:   Recent Labs   Lab Test 23  1334 23  1033   CHOL 207* 206*   HDL 56 58   * 135*  131*   TRIG 97 64     Last Mammo:  2023 , Result: Normal, Next Mammo: 2024  Pap:   Lab Results   Component Value Date    PAP NIL 07/15/2019    PAP NIL 10/23/2012     Colonoscopy:  19, Result: Normal, Next Colonoscopy: 5 years.  Dexa:  08/10/22    Health maintenance updated:  Yes    HISTORY:  OB History    Para Term  AB Living   6 2 2 0 4 2   SAB IAB Ectopic Multiple Live Births   2 2 0 0 2      # Outcome Date GA Lbr Rajesh/2nd Weight Sex Delivery Anes PTL Lv   6 Term 10/01/98    F CS-LTranv   ARASELI      Name: Robin   5 Term 96    F CS-LTranv   ARASELI      Name: Josh   4 IAB             3 IAB            2 SAB            1 SAB                Patient Active Problem List   Diagnosis    Varicose veins of both legs with edema    Hyperthyroidism    S/P abdominal supracervical subtotal hysterectomy    Elevated fasting glucose    Pure hypercholesterolemia    Post-menopause on HRT (hormone replacement therapy)    Symptoms, such as flushing, sleeplessness, headache, lack of concentration, associated with the menopause    Left knee pain     Past Surgical History:   Procedure Laterality Date    APPENDECTOMY       SECTION      CHOLECYSTECTOMY, LAPOROSCOPIC  2012    Cholecystectomy, Laparoscopic    COLONOSCOPY N/A 2017    Procedure: COLONOSCOPY;  COLONOSCOPY;  Surgeon: Shannon Noel MD;  Location:  GI    COLONOSCOPY N/A 2019    Procedure: COLONOSCOPY;  Surgeon: Shannon Noel MD;  Location:  GI    HYSTERECTOMY SUPRACERVICAL N/A 2014    Procedure: HYSTERECTOMY SUPRACERVICAL;  Surgeon: Shauna Arceo MD;  Location:  OR [facility] done by Adis/ Leora. started as laparoscopic but converted to open due to adhesions    LAPAROSCOPIC CYSTECTOMY OVARIAN (ONCOLOGY)      right. benign cyst. partial oopherectomy in [facility]    LAPAROSCOPIC SALPINGO-OOPHORECTOMY  14    MYOMECTOMY UTERUS  2008    hysteroscopic    TONSILLECTOMY        Social History     Tobacco Use    Smoking status: Former     Packs/day: 0.00     Years: 0.00     Additional pack years: 0.00     Total pack years: 0.00     Types: Cigarettes    Smokeless tobacco: Never    Tobacco comments:     Quit 31+ years ago.   Substance Use Topics    Alcohol use: Yes     Comment: Social Drinking      Problem (# of Occurrences) Relation (Name,Age of Onset)    Cancer (1) Mother (Lyndsey Wolff): melanoma    Diabetes (2) Mother (Lyndsey Wolff), Maternal Grandmother (Mallorie Wolff)    Thyroid Disease (1) Cousin (Vianey Gruber)    Breast Cancer (2) Maternal Aunt, Other (Ynes Gruber): Maternal Sister     "Coronary Artery Disease (1) Father (Arnie Wheatley)    Colon Polyps (1) Mother (Lyndsey Wolff)    No Known Problems (4) Sister, Brother, Maternal Grandfather, Paternal Grandmother           Negative family history of: Asthma              Current Outpatient Medications   Medication Sig    EPIDUO FORTE 0.3-2.5 % gel Apply 0.3 Pump topically as needed    fexofenadine (ALLEGRA) 180 MG tablet Take 180 mg by mouth daily    methimazole (TAPAZOLE) 5 MG tablet TAKE 1/2 TABLET BY MOUTH DAILY    pravastatin (PRAVACHOL) 20 MG tablet Take 1 tablet (20 mg) by mouth daily     No current facility-administered medications for this visit.     Allergies   Allergen Reactions    Penicillins     Seasonal Allergies     Amoxicillin Rash       Past medical, surgical, social and family histories were reviewed and updated in EPIC.    EXAM:  /70   Ht 1.607 m (5' 3.25\")   Wt 81.1 kg (178 lb 12.8 oz)   LMP 10/26/2014   Breastfeeding No   BMI 31.42 kg/m     BMI: Body mass index is 31.42 kg/m .    PHYSICAL EXAM:  Constitutional:   Appearance: Well nourished, well developed, alert, in no acute distress  Neck:  Lymph Nodes:  No lymphadenopathy present    Thyroid:  RIGHT LOWER  THYROID mildly enlarged GENERALLY BUT SOMEWHAT MORE ROUNDED LOCALIZED AREA, nontender, no nodules or masses present on palpation  Chest:  Respiratory Effort:  Breathing unlabored, CTA B  Cardiovascular:    Heart: Auscultation:  Regular rate, normal rhythm, no murmurs present  Breasts: Inspection of Breasts:  No lymphadenopathy present., Palpation of Breasts and Axillae:  No masses present on palpation, no breast tenderness., Axillary Lymph Nodes:  No lymphadenopathy present., and No nodularity, asymmetry or nipple discharge bilaterally.  Gastrointestinal:   Abdominal Examination:  Abdomen nontender to palpation, tone normal without rigidity or guarding, no masses present, umbilicus without lesions   Liver and Spleen:  No hepatomegaly present, liver nontender to " palpation    Hernias:  No hernias present  Lymphatic: Lymph Nodes:  No other lymphadenopathy present  Skin:  General Inspection:  No rashes present, no lesions present, no areas of  discoloration  Neurologic:    Mental Status:  Oriented X3.  Normal strength and tone, sensory exam  grossly normal, mentation intact and speech normal.    Psychiatric:   Mentation appears normal and affect normal/bright.         Pelvic Exam:  External Genitalia:     Normal appearance for age, no discharge present, no tenderness present, no inflammatory lesions present, color normal  Vagina:     Normal vaginal vault without central or paravaginal defects, no discharge present, no inflammatory lesions present, no masses present  Bladder:     Nontender to palpation  Urethra:   Urethral Body:  Urethra palpation normal, urethra structural support normal   Urethral Meatus:  No erythema or lesions present  Cervix:    Appearance healthy, no lesions present, nontender to palpation, no bleeding present  Uterus:     Surgically absent  Adnexa:     Surgically absent  Perineum:     Perineum within normal limits, no evidence of trauma, no rashes or skin lesions present  Anus:     Anus within normal limits, no hemorrhoids present  Inguinal Lymph Nodes:     No lymphadenopathy present  Pubic Hair:     Normal pubic hair distribution for age  Genitalia and Groin:     No rashes present, no lesions present, no areas of discoloration, no masses present    COUNSELING:   Reviewed preventive health counseling, as reflected in patient instructions       Osteoporosis prevention/bone health       (Esther)menopause management    BMI: Body mass index is 31.42 kg/m .  Weight management plan: Discussed healthy diet and exercise guidelines    ASSESSMENT:  53 year old female with satisfactory annual exam.    ICD-10-CM    1. Encounter for gynecological examination without abnormal finding  Z01.419 Pap thin layer screen with HPV - recommended age 30 - 65 years      2.  Symptoms, such as flushing, sleeplessness, headache, lack of concentration, associated with the menopause  N95.1       3. Varicose veins of both legs with edema  I83.893       4. Pure hypercholesterolemia  E78.00       5. S/P abdominal supracervical subtotal hysterectomy  Z90.711       6. Hyperthyroidism  E05.90       7. Enlarged thyroid  E04.9           PLAN:    Pap today, Can follow routine ASCCP guidelines     Mammo due 6/24 so will schedule that. Not sure how she got off schedule with her annuals so may try to push her annual out longer next year to sync back up    Last colonoscopy 2019 - next due 11/2024    Fasting labs deferred to her PCP as well as lipid mgmt and pravachol    Defer her thyroid mgmt to Dr. Carrasco  However encouraged her to mention to him that she had somewhat more of a swelling in her lower right thyroid so he can fully assess that and determine need for U/S or not since more recently was euthyroid on lab testing      Additional health issues addressed at today's visit include:     Reviewed ERT vs HRT and the overall pros/cons/risks in the long term vs overall QOL  She has been off ERT for about 4-5 months now and having very tolerable and infrequent v.m sx and no vaginal/bladder issues   For now is going to stay off her ERT but if sx recur can always consider restarting and if doing ok w/o anything currently then if restart could also consider further wean to 0.05 from most recent 0.075 as well, if she'd like     Shauna Arceo MD

## 2024-02-23 ENCOUNTER — OFFICE VISIT (OUTPATIENT)
Dept: OBGYN | Facility: CLINIC | Age: 54
End: 2024-02-23
Payer: COMMERCIAL

## 2024-02-23 VITALS
WEIGHT: 178.8 LBS | HEIGHT: 63 IN | BODY MASS INDEX: 31.68 KG/M2 | SYSTOLIC BLOOD PRESSURE: 110 MMHG | DIASTOLIC BLOOD PRESSURE: 70 MMHG

## 2024-02-23 DIAGNOSIS — E78.00 PURE HYPERCHOLESTEROLEMIA: ICD-10-CM

## 2024-02-23 DIAGNOSIS — E05.90 HYPERTHYROIDISM: ICD-10-CM

## 2024-02-23 DIAGNOSIS — Z90.711 S/P ABDOMINAL SUPRACERVICAL SUBTOTAL HYSTERECTOMY: ICD-10-CM

## 2024-02-23 DIAGNOSIS — I83.893 VARICOSE VEINS OF BOTH LEGS WITH EDEMA: ICD-10-CM

## 2024-02-23 DIAGNOSIS — N95.1 SYMPTOMS, SUCH AS FLUSHING, SLEEPLESSNESS, HEADACHE, LACK OF CONCENTRATION, ASSOCIATED WITH THE MENOPAUSE: ICD-10-CM

## 2024-02-23 DIAGNOSIS — Z01.419 ENCOUNTER FOR GYNECOLOGICAL EXAMINATION WITHOUT ABNORMAL FINDING: Primary | ICD-10-CM

## 2024-02-23 DIAGNOSIS — E04.9 ENLARGED THYROID: ICD-10-CM

## 2024-02-23 PROCEDURE — G0145 SCR C/V CYTO,THINLAYER,RESCR: HCPCS | Performed by: OBSTETRICS & GYNECOLOGY

## 2024-02-23 PROCEDURE — 99396 PREV VISIT EST AGE 40-64: CPT | Performed by: OBSTETRICS & GYNECOLOGY

## 2024-02-23 PROCEDURE — 87624 HPV HI-RISK TYP POOLED RSLT: CPT | Performed by: OBSTETRICS & GYNECOLOGY

## 2024-02-23 ASSESSMENT — ANXIETY QUESTIONNAIRES
3. WORRYING TOO MUCH ABOUT DIFFERENT THINGS: NOT AT ALL
5. BEING SO RESTLESS THAT IT IS HARD TO SIT STILL: NOT AT ALL
7. FEELING AFRAID AS IF SOMETHING AWFUL MIGHT HAPPEN: NOT AT ALL
GAD7 TOTAL SCORE: 0
IF YOU CHECKED OFF ANY PROBLEMS ON THIS QUESTIONNAIRE, HOW DIFFICULT HAVE THESE PROBLEMS MADE IT FOR YOU TO DO YOUR WORK, TAKE CARE OF THINGS AT HOME, OR GET ALONG WITH OTHER PEOPLE: NOT DIFFICULT AT ALL
1. FEELING NERVOUS, ANXIOUS, OR ON EDGE: NOT AT ALL
2. NOT BEING ABLE TO STOP OR CONTROL WORRYING: NOT AT ALL
6. BECOMING EASILY ANNOYED OR IRRITABLE: NOT AT ALL
GAD7 TOTAL SCORE: 0

## 2024-02-23 ASSESSMENT — PATIENT HEALTH QUESTIONNAIRE - PHQ9
SUM OF ALL RESPONSES TO PHQ QUESTIONS 1-9: 0
5. POOR APPETITE OR OVEREATING: NOT AT ALL

## 2024-02-28 LAB
BKR LAB AP GYN ADEQUACY: NORMAL
BKR LAB AP GYN INTERPRETATION: NORMAL
BKR LAB AP HPV REFLEX: NORMAL
BKR LAB AP PREVIOUS ABNORMAL: NORMAL
PATH REPORT.COMMENTS IMP SPEC: NORMAL
PATH REPORT.COMMENTS IMP SPEC: NORMAL
PATH REPORT.RELEVANT HX SPEC: NORMAL

## 2024-02-29 LAB
HUMAN PAPILLOMA VIRUS 16 DNA: NEGATIVE
HUMAN PAPILLOMA VIRUS 18 DNA: NEGATIVE
HUMAN PAPILLOMA VIRUS FINAL DIAGNOSIS: NORMAL
HUMAN PAPILLOMA VIRUS OTHER HR: NEGATIVE

## 2024-03-11 DIAGNOSIS — E78.5 HYPERLIPIDEMIA LDL GOAL <100: ICD-10-CM

## 2024-03-15 ENCOUNTER — TELEPHONE (OUTPATIENT)
Dept: CARDIOLOGY | Facility: CLINIC | Age: 54
End: 2024-03-15
Payer: COMMERCIAL

## 2024-03-15 NOTE — TELEPHONE ENCOUNTER
Patient Contacted for the patient to call back and schedule the following:    Appointment type: return cardiology  Provider: lisa   Return date: 10/07/24  Specialty phone number: [phone] opt 1  Additional appointment(s) needed: n/a   Additonal Notes: n/a

## 2024-03-17 RX ORDER — PRAVASTATIN SODIUM 20 MG
20 TABLET ORAL DAILY
Qty: 90 TABLET | Refills: 2 | Status: SHIPPED | OUTPATIENT
Start: 2024-03-17 | End: 2024-10-07

## 2024-03-17 NOTE — TELEPHONE ENCOUNTER
pravastatin (PRAVACHOL) 20 MG tablet   90 tablet 3 3/27/2023     6/29/2023  [facility] David Friedman MD  Cardiology    Nv: 10/7/24

## 2024-03-18 NOTE — PROGRESS NOTES
DISCHARGE SUMMARY    Shital Parrish was seen 2  times for evaluation and treatment.  Patient did not return for further treatment and current status is unknown.  Due to short treatment duration, no objective or functional changes were made.  Please see goal flow sheet from episode noted date below and initial evaluation for further information.  Patient is discharged from therapy and therapy episode is resolved as of 03/18/24.      Linked Episodes   Type: Episode: Status: Noted: Resolved: Last update: Updated by:   PHYSICAL THERAPY L knee pain 12/26/2023 Active 12/26/2023 1/9/2024  8:02 AM Brittnee Arzola, PT      Comments:

## 2024-05-28 ENCOUNTER — TELEPHONE (OUTPATIENT)
Dept: FAMILY MEDICINE | Facility: CLINIC | Age: 54
End: 2024-05-28
Payer: COMMERCIAL

## 2024-05-28 NOTE — TELEPHONE ENCOUNTER
Reason for Call:  Appointment Request    Patient requesting this type of appt:  Preventive and follow-up on veins on legs    Requested provider: Tim Hill    Reason patient unable to be scheduled: Not within requested timeframe    When does patient want to be seen/preferred time:  on a Friday anytime if on another day of the week early or late in the day     Comments: patient wondering if she could be worked in    Could we send this information to you in [employer] or would you prefer to receive a phone call?:   Patient would prefer a phone call or [employer]  Okay to leave a detailed message?: Yes at Home number on file [phone] (home)    Call taken on 5/28/2024 at 8:17 AM by Felicita Foy

## 2024-05-28 NOTE — TELEPHONE ENCOUNTER
Please have her to take the slot Friday 6-7-24 at 10:40 and close the remaining doublebook slot      thx

## 2024-06-07 ENCOUNTER — OFFICE VISIT (OUTPATIENT)
Dept: FAMILY MEDICINE | Facility: CLINIC | Age: 54
End: 2024-06-07
Payer: COMMERCIAL

## 2024-06-07 VITALS
BODY MASS INDEX: 32.32 KG/M2 | OXYGEN SATURATION: 98 % | DIASTOLIC BLOOD PRESSURE: 85 MMHG | RESPIRATION RATE: 16 BRPM | HEART RATE: 76 BPM | TEMPERATURE: 97.8 F | SYSTOLIC BLOOD PRESSURE: 123 MMHG | HEIGHT: 64 IN | WEIGHT: 189.3 LBS

## 2024-06-07 DIAGNOSIS — I99.9 PERIPHERAL VASCULAR COMPLICATION: ICD-10-CM

## 2024-06-07 DIAGNOSIS — Z00.00 HEALTH MAINTENANCE EXAMINATION: Primary | ICD-10-CM

## 2024-06-07 DIAGNOSIS — Z91.018 ALLERGIC TO FOOD: ICD-10-CM

## 2024-06-07 DIAGNOSIS — R63.5 ABNORMAL WEIGHT GAIN: ICD-10-CM

## 2024-06-07 DIAGNOSIS — E05.90 HYPERTHYROIDISM: ICD-10-CM

## 2024-06-07 LAB
ERYTHROCYTE [DISTWIDTH] IN BLOOD BY AUTOMATED COUNT: 11.7 % (ref 10–15)
ERYTHROCYTE [SEDIMENTATION RATE] IN BLOOD BY WESTERGREN METHOD: 7 MM/HR (ref 0–30)
HBA1C MFR BLD: 5.5 % (ref 0–5.6)
HCT VFR BLD AUTO: 39.4 % (ref 35–47)
HGB BLD-MCNC: 13.8 G/DL (ref 11.7–15.7)
MCH RBC QN AUTO: 31 PG (ref 26.5–33)
MCHC RBC AUTO-ENTMCNC: 35 G/DL (ref 31.5–36.5)
MCV RBC AUTO: 89 FL (ref 78–100)
PLATELET # BLD AUTO: 265 10E3/UL (ref 150–450)
RBC # BLD AUTO: 4.45 10E6/UL (ref 3.8–5.2)
WBC # BLD AUTO: 5.5 10E3/UL (ref 4–11)

## 2024-06-07 PROCEDURE — 84443 ASSAY THYROID STIM HORMONE: CPT | Performed by: FAMILY MEDICINE

## 2024-06-07 PROCEDURE — 36415 COLL VENOUS BLD VENIPUNCTURE: CPT | Performed by: FAMILY MEDICINE

## 2024-06-07 PROCEDURE — 80053 COMPREHEN METABOLIC PANEL: CPT | Performed by: FAMILY MEDICINE

## 2024-06-07 PROCEDURE — 99214 OFFICE O/P EST MOD 30 MIN: CPT | Mod: 25 | Performed by: FAMILY MEDICINE

## 2024-06-07 PROCEDURE — 85652 RBC SED RATE AUTOMATED: CPT | Performed by: FAMILY MEDICINE

## 2024-06-07 PROCEDURE — 82306 VITAMIN D 25 HYDROXY: CPT | Performed by: FAMILY MEDICINE

## 2024-06-07 PROCEDURE — 83036 HEMOGLOBIN GLYCOSYLATED A1C: CPT | Performed by: FAMILY MEDICINE

## 2024-06-07 PROCEDURE — 86003 ALLG SPEC IGE CRUDE XTRC EA: CPT | Performed by: FAMILY MEDICINE

## 2024-06-07 PROCEDURE — 85027 COMPLETE CBC AUTOMATED: CPT | Performed by: FAMILY MEDICINE

## 2024-06-07 PROCEDURE — 99396 PREV VISIT EST AGE 40-64: CPT | Performed by: FAMILY MEDICINE

## 2024-06-07 PROCEDURE — 80061 LIPID PANEL: CPT | Performed by: FAMILY MEDICINE

## 2024-06-07 RX ORDER — CILOSTAZOL 50 MG/1
50 TABLET ORAL AT BEDTIME
Qty: 90 TABLET | Refills: 1 | Status: SHIPPED | OUTPATIENT
Start: 2024-06-07

## 2024-06-07 SDOH — HEALTH STABILITY: PHYSICAL HEALTH: ON AVERAGE, HOW MANY DAYS PER WEEK DO YOU ENGAGE IN MODERATE TO STRENUOUS EXERCISE (LIKE A BRISK WALK)?: 3 DAYS

## 2024-06-07 ASSESSMENT — PATIENT HEALTH QUESTIONNAIRE - PHQ9
5. POOR APPETITE OR OVEREATING: NOT AT ALL
SUM OF ALL RESPONSES TO PHQ QUESTIONS 1-9: 0

## 2024-06-07 ASSESSMENT — ANXIETY QUESTIONNAIRES
1. FEELING NERVOUS, ANXIOUS, OR ON EDGE: NOT AT ALL
2. NOT BEING ABLE TO STOP OR CONTROL WORRYING: NOT AT ALL
5. BEING SO RESTLESS THAT IT IS HARD TO SIT STILL: NOT AT ALL
GAD7 TOTAL SCORE: 0
7. FEELING AFRAID AS IF SOMETHING AWFUL MIGHT HAPPEN: NOT AT ALL
GAD7 TOTAL SCORE: 0
6. BECOMING EASILY ANNOYED OR IRRITABLE: NOT AT ALL
3. WORRYING TOO MUCH ABOUT DIFFERENT THINGS: NOT AT ALL

## 2024-06-07 ASSESSMENT — PAIN SCALES - GENERAL: PAINLEVEL: NO PAIN (0)

## 2024-06-07 ASSESSMENT — SOCIAL DETERMINANTS OF HEALTH (SDOH): HOW OFTEN DO YOU GET TOGETHER WITH FRIENDS OR RELATIVES?: MORE THAN THREE TIMES A WEEK

## 2024-06-07 NOTE — PROGRESS NOTES
Preventive Care Visit  [facility] YEISON iHll MD, Family Medicine  Jun 7, 2024      Assessment & Plan     Health maintenance examination    - CBC with platelets; Future  - Comprehensive metabolic panel (BMP + Alb, Alk Phos, ALT, AST, Total. Bili, TP); Future  - Lipid panel reflex to direct LDL Fasting; Future  - TSH with free T4 reflex; Future    Hyperthyroidism    - TSH with free T4 reflex; Future  - Hemoglobin A1c; Future  - Vitamin D Deficiency; Future    Abnormal weight gain  Has not been able to lose wt with life style modification   She has thyroid diease, will check on lab for further evaluation   - Hemoglobin A1c; Future  - Vitamin D Deficiency; Future    Allergic to food  Has bene having swelling joint and allergic reaction with variety of food, will have her to check on lab for food allergy screening   - Allergy adult food panel; Future  - Allergen gluten IgE; Future  - ESR: Erythrocyte sedimentation rate; Future    Peripheral vascular complication  Has been having pain and bruise on bilateral LE, has tingling and numbess on bilateral UE for over past 1 year, will have her to try cilostazol   - cilostazol (PLETAL) 50 MG tablet; Take 1 tablet (50 mg) by mouth at bedtime    Patient has been advised of split billing requirements and indicates understanding: Yes        Counseling  Appropriate preventive services were discussed with this patient, including applicable screening as appropriate for fall prevention, nutrition, physical activity, Tobacco-use cessation, weight loss and cognition.  Checklist reviewing preventive services available has been given to the patient.  Reviewed patient's diet, addressing concerns and/or questions.   She is at risk for lack of exercise and has been provided with information to increase physical activity for the benefit of her well-being.       FUTURE APPOINTMENTS:       - Follow-up visit in 1  year     Marianne Isaacs is a 53 year old,  presenting for the following:  Physical (Fasting.) and Consult (Leg swelling and vein discomfort. Looking for treatment options.)        6/7/2024    10:49 AM   Additional Questions   Roomed by Sona BRISENO        Health Care Directive  Patient does not have a Health Care Directive or Living Will: Patient states has Advance Directive and will bring in a copy to clinic.    HPI        6/7/2024   General Health   How would you rate your overall physical health? Good   Feel stress (tense, anxious, or unable to sleep) Not at all         6/7/2024   Nutrition   Three or more servings of calcium each day? Yes   Diet: Regular (no restrictions)   How many servings of fruit and vegetables per day? (!) 2-3   How many sweetened beverages each day? 0-1         6/7/2024   Exercise   Days per week of moderate/strenous exercise 3 days         6/7/2024   Social Factors   Frequency of gathering with friends or relatives More than three times a week   Worry food won't last until get money to buy more No   Food not last or not have enough money for food? No   Do you have housing?  Yes   Are you worried about losing your housing? No   Lack of transportation? No   Unable to get utilities (heat,electricity)? No         6/7/2024   Fall Risk   Fallen 2 or more times in the past year? No   Trouble with walking or balance? No          6/7/2024   Dental   Dentist two times every year? Yes         6/7/2024   TB Screening   Were you born outside of the [place]? Yes           Today's PHQ-2 Score:       2/23/2024     1:35 PM   PHQ-2 ( 1999 [employer])   Q1: Little interest or pleasure in doing things 0   Q2: Feeling down, depressed or hopeless 0   PHQ-2 Score 0         6/7/2024   Substance Use   Alcohol more than 3/day or more than 7/wk No   Do you use any other substances recreationally? No    (!) PRESCRIPTION DRUGS     Social History     Tobacco Use    Smoking status: Former     Current packs/day: 0.00     Types: Cigarettes    Smokeless tobacco: Never     Tobacco comments:     Quit 31+ years ago.   Vaping Use    Vaping status: Never Used   Substance Use Topics    Alcohol use: Yes     Comment: Social Drinking    Drug use: No           7/17/2023   LAST FHS-7 RESULTS   1st degree relative breast or ovarian cancer Yes   Any relative bilateral breast cancer Yes   Any male have breast cancer No   Any ONE woman have BOTH breast AND ovarian cancer Yes   Any woman with breast cancer before 50yrs Yes   2 or more relatives with breast AND/OR ovarian cancer No   2 or more relatives with breast AND/OR bowel cancer No        Mammogram Screening - Mammogram every 1-2 years updated in Health Maintenance based on mutual decision making        6/7/2024   STI Screening   New sexual partner(s) since last STI/HIV test? No     History of abnormal Pap smear: No - age 30-64 HPV with reflex Pap every 5 years recommended        Latest Ref Rng & Units 2/23/2024     2:43 PM 7/15/2019    12:43 PM 7/15/2019    12:00 PM   PAP / HPV   PAP  Negative for Intraepithelial Lesion or Malignancy (NILM)      PAP (Historical)   NIL     HPV 16 DNA Negative Negative   Negative    HPV 18 DNA Negative Negative   Negative    Other HR HPV Negative Negative   Negative      ASCVD Risk   The 10-year ASCVD risk score (Marisa POMPA, et al., 2019) is: 1.7%    Values used to calculate the score:      Age: 53 years      Sex: Female      Is Non- : No      Diabetic: No      Tobacco smoker: No      Systolic Blood Pressure: 123 mmHg      Is BP treated: No      HDL Cholesterol: 56 mg/dL      Total Cholesterol: 207 mg/dL           Reviewed and updated as needed this visit by Provider                    Past Medical History:   Diagnosis Date    Edema 10/24/2013    Gastro-oesophageal reflux disease     Heavy periods 10/24/2013    Hyperthyroidism 9/26/2014    borderline-treated with a med for 6 months in [place] and then numbers improved. had a low TSH here and referred to [facility]. numbers there were  borderline but normal and no antibodies. rec. -I123 uptake was increased so Graves dz dx'd. may do methimazole if repeat TFTs show hyperthyroid    Leiomyoma of uterus, unspecified 2014    Nipple discharge     right side only. neg mammo and right breast u/s. MRI pending    PONV (postoperative nausea and vomiting)     Post-menopause on HRT (hormone replacement therapy) 2021    Pure hypercholesterolemia 2021    Varicose veins of lower extremities with other complications 10/24/2013     Past Surgical History:   Procedure Laterality Date    APPENDECTOMY       SECTION      CHOLECYSTECTOMY, LAPOROSCOPIC  2012    Cholecystectomy, Laparoscopic    COLONOSCOPY N/A 2017    Procedure: COLONOSCOPY;  COLONOSCOPY;  Surgeon: Shannon Noel MD;  Location:  GI    COLONOSCOPY N/A 2019    Procedure: COLONOSCOPY;  Surgeon: Shannon Noel MD;  Location:  GI    HYSTERECTOMY SUPRACERVICAL N/A 2014    Procedure: HYSTERECTOMY SUPRACERVICAL;  Surgeon: Shauna Arceo MD;  Location:  OR [facility] and [facility] done by Adis/ Leora. started as laparoscopic but converted to open due to adhesions    LAPAROSCOPIC CYSTECTOMY OVARIAN (ONCOLOGY)      right. benign cyst. partial oopherectomy in [facility]    LAPAROSCOPIC SALPINGO-OOPHORECTOMY  14    MYOMECTOMY UTERUS  2008    hysteroscopic    TONSILLECTOMY       OB History    Para Term  AB Living   6 2 2 0 4 2   SAB IAB Ectopic Multiple Live Births   2 2 0 0 2      # Outcome Date GA Lbr Rajesh/2nd Weight Sex Type Anes PTL Lv   6 Term 10/01/98    F CS-LTranv   ARASELI      Name: Myrto   5 Term 96    F CS-LTranv   ARASELI      Name: Nefeli   4 IAB            3 IAB            2 SAB            1 SAB              Lab work is in process  BP Readings from Last 3 Encounters:   24 123/85   24 110/70   10/20/23 126/86    Wt Readings from Last 3 Encounters:   24 85.9 kg (189 lb 4.8 oz)   24 81.1 kg (178  lb 12.8 oz)   23 83.4 kg (183 lb 13.8 oz)                  Patient Active Problem List   Diagnosis    Varicose veins of both legs with edema    Hyperthyroidism    S/P abdominal supracervical subtotal hysterectomy    Elevated fasting glucose    Pure hypercholesterolemia    Post-menopause on HRT (hormone replacement therapy)    Symptoms, such as flushing, sleeplessness, headache, lack of concentration, associated with the menopause    Left knee pain     Past Surgical History:   Procedure Laterality Date    APPENDECTOMY       SECTION      CHOLECYSTECTOMY, LAPOROSCOPIC  2012    Cholecystectomy, Laparoscopic    COLONOSCOPY N/A 2017    Procedure: COLONOSCOPY;  COLONOSCOPY;  Surgeon: Shannon Noel MD;  Location:  GI    COLONOSCOPY N/A 2019    Procedure: COLONOSCOPY;  Surgeon: Shannon Noel MD;  Location:  GI    HYSTERECTOMY SUPRACERVICAL N/A 2014    Procedure: HYSTERECTOMY SUPRACERVICAL;  Surgeon: Shauna Arceo MD;  Location:  OR [facility] done by Adis/ Leora. started as laparoscopic but converted to open due to adhesions    LAPAROSCOPIC CYSTECTOMY OVARIAN (ONCOLOGY)      right. benign cyst. partial oopherectomy in [facility]    LAPAROSCOPIC SALPINGO-OOPHORECTOMY  14    MYOMECTOMY UTERUS  2008    hysteroscopic    TONSILLECTOMY  1979       Social History     Tobacco Use    Smoking status: Former     Current packs/day: 0.00     Types: Cigarettes    Smokeless tobacco: Never    Tobacco comments:     Quit 31+ years ago.   Substance Use Topics    Alcohol use: Yes     Comment: Social Drinking     Family History   Problem Relation Age of Onset    Diabetes Mother     Cancer Mother         melanoma    Colon Polyps Mother     Coronary Artery Disease Father     No Known Problems Sister     Diabetes Maternal Grandmother     Breast Cancer Maternal Aunt     Thyroid Disease Cousin     No Known Problems Brother     No Known Problems Maternal Grandfather     No Known  "Problems Paternal Grandmother     Breast Cancer Other         Maternal Sister    Asthma No family hx of          Current Outpatient Medications   Medication Sig Dispense Refill    cilostazol (PLETAL) 50 MG tablet Take 1 tablet (50 mg) by mouth at bedtime 90 tablet 1    EPIDUO FORTE 0.3-2.5 % gel Apply 0.3 Pump topically as needed      fexofenadine (ALLEGRA) 180 MG tablet Take 180 mg by mouth daily      methimazole (TAPAZOLE) 5 MG tablet TAKE 1/2 TABLET BY MOUTH DAILY      pravastatin (PRAVACHOL) 20 MG tablet TAKE 1 TABLET BY MOUTH DAILY 90 tablet 2     Allergies   Allergen Reactions    Lactose Intolerance (Gi) GI Disturbance    Penicillins     Seasonal Allergies     Amoxicillin Rash     Recent Labs   Lab Test 12/26/23  1404 07/19/23  1334 06/28/23  1033 03/24/23  0813 06/23/21  0915 05/20/21  1615 12/14/20  0811 11/11/20  0000 09/25/20  0805 10/17/19  0000   A1C  --  5.2  --   --   --   --   --  5.2  --  5.1   LDL  --  132* 135*  131* 120*  118*   < >  --    < >  --  205*  --    HDL  --  56 58 49*   < >  --    < >  --  57  --    TRIG  --  97 64 73   < >  --    < >  --  89  --    ALT  --  30 32 14   < > 29   < >  --  24  --    CR  --  0.63 0.64 0.68   < > 0.60  --   --  0.67  --    GFRESTIMATED  --  >90 >90 >90   < > >90  --   --  >90  --    GFRESTBLACK  --   --   --   --   --  >90  --   --  >90  --    POTASSIUM  --  4.2 4.4 4.3   < > 4.3  --   --  4.3  --    TSH 0.69 0.76  --   --    < > 0.84  --  0.90  --  0.68    < > = values in this interval not displayed.          Review of Systems  Constitutional, HEENT, cardiovascular, pulmonary, GI, , musculoskeletal, neuro, skin, endocrine and psych systems are negative, except as otherwise noted.     Objective    Exam  /85   Pulse 76   Temp 97.8  F (36.6  C) (Temporal)   Resp 16   Ht 1.62 m (5' 3.78\")   Wt 85.9 kg (189 lb 4.8 oz)   LMP 10/26/2014   SpO2 98%   BMI 32.72 kg/m     Estimated body mass index is 32.72 kg/m  as calculated from the following:    " "Height as of this encounter: 1.62 m (5' 3.78\").    Weight as of this encounter: 85.9 kg (189 lb 4.8 oz).    Physical Exam  GENERAL: alert and no distress  EYES: Eyes grossly normal to inspection, PERRL and conjunctivae and sclerae normal  HENT: ear canals and TM's normal, nose and mouth without ulcers or lesions  NECK: no adenopathy, no asymmetry, masses, or scars  RESP: lungs clear to auscultation - no rales, rhonchi or wheezes  CV: regular rate and rhythm, normal S1 S2, no S3 or S4, no murmur, click or rub, no peripheral edema  ABDOMEN: soft, nontender, no hepatosplenomegaly, no masses and bowel sounds normal  MS: no gross musculoskeletal defects noted, no edema  SKIN: no suspicious lesions or rashes  NEURO: Normal strength and tone, mentation intact and speech normal  BACK: no CVA tenderness, no paralumbar tenderness  PSYCH: mentation appears normal, affect normal/bright  LYMPH: no cervical, supraclavicular, axillary, or inguinal adenopathy        Signed Electronically by: Tim Hill MD    "

## 2024-06-08 LAB
ALBUMIN SERPL BCG-MCNC: 4.3 G/DL (ref 3.5–5.2)
ALP SERPL-CCNC: 59 U/L (ref 40–150)
ALT SERPL W P-5'-P-CCNC: 53 U/L (ref 0–50)
ANION GAP SERPL CALCULATED.3IONS-SCNC: 9 MMOL/L (ref 7–15)
AST SERPL W P-5'-P-CCNC: 34 U/L (ref 0–45)
BILIRUB SERPL-MCNC: 0.4 MG/DL
BUN SERPL-MCNC: 14.9 MG/DL (ref 6–20)
CALCIUM SERPL-MCNC: 9.4 MG/DL (ref 8.6–10)
CHLORIDE SERPL-SCNC: 104 MMOL/L (ref 98–107)
CHOLEST SERPL-MCNC: 192 MG/DL
CREAT SERPL-MCNC: 0.66 MG/DL (ref 0.51–0.95)
DEPRECATED HCO3 PLAS-SCNC: 26 MMOL/L (ref 22–29)
EGFRCR SERPLBLD CKD-EPI 2021: >90 ML/MIN/1.73M2
FASTING STATUS PATIENT QL REPORTED: YES
FASTING STATUS PATIENT QL REPORTED: YES
GLUCOSE SERPL-MCNC: 93 MG/DL (ref 70–99)
HDLC SERPL-MCNC: 47 MG/DL
LDLC SERPL CALC-MCNC: 117 MG/DL
NONHDLC SERPL-MCNC: 145 MG/DL
POTASSIUM SERPL-SCNC: 4.4 MMOL/L (ref 3.4–5.3)
PROT SERPL-MCNC: 6.8 G/DL (ref 6.4–8.3)
SODIUM SERPL-SCNC: 139 MMOL/L (ref 135–145)
TRIGL SERPL-MCNC: 140 MG/DL
TSH SERPL DL<=0.005 MIU/L-ACNC: 0.62 UIU/ML (ref 0.3–4.2)
VIT D+METAB SERPL-MCNC: 35 NG/ML (ref 20–50)

## 2024-06-10 LAB
ALMOND IGE QN: <0.1 KU(A)/L
CASHEW NUT IGE QN: <0.1 KU(A)/L
CODFISH IGE QN: <0.1 KU(A)/L
COW MILK IGE QN: <0.1 KU(A)/L
EGG WHITE IGE QN: <0.1 KU(A)/L
GLUTEN IGE QN: <0.1 KU(A)/L
HAZELNUT IGE QN: <0.1 KU(A)/L
IGE SERPL-ACNC: 5 KU/L (ref 0–114)
PEANUT IGE QN: <0.1 KU(A)/L
SALMON IGE QN: <0.1 KU(A)/L
SCALLOP IGE QN: <0.1 KU(A)/L
SESAME SEED IGE QN: <0.1 KU(A)/L
SHRIMP IGE QN: <0.1 KU(A)/L
SOYBEAN IGE QN: <0.1 KU(A)/L
TUNA IGE QN: <0.1 KU(A)/L
WALNUT IGE QN: <0.1 KU(A)/L
WHEAT IGE QN: <0.1 KU(A)/L

## 2024-06-21 ENCOUNTER — ANCILLARY PROCEDURE (OUTPATIENT)
Dept: MAMMOGRAPHY | Facility: CLINIC | Age: 54
End: 2024-06-21
Payer: COMMERCIAL

## 2024-06-21 DIAGNOSIS — Z12.31 VISIT FOR SCREENING MAMMOGRAM: ICD-10-CM

## 2024-06-21 PROCEDURE — 77063 BREAST TOMOSYNTHESIS BI: CPT | Mod: TC | Performed by: RADIOLOGY

## 2024-06-21 PROCEDURE — 77067 SCR MAMMO BI INCL CAD: CPT | Mod: TC | Performed by: RADIOLOGY

## 2024-09-09 ENCOUNTER — TELEPHONE (OUTPATIENT)
Dept: ORTHOPEDICS | Facility: CLINIC | Age: 54
End: 2024-09-09
Payer: COMMERCIAL

## 2024-09-10 ENCOUNTER — MYC MEDICAL ADVICE (OUTPATIENT)
Dept: ORTHOPEDICS | Facility: CLINIC | Age: 54
End: 2024-09-10
Payer: COMMERCIAL

## 2024-09-10 NOTE — TELEPHONE ENCOUNTER
Left voicemail using  services for patient to reschedule appointment.     MyChart message also sent.     Adelina Lux ATC

## 2024-09-12 NOTE — TELEPHONE ENCOUNTER
Left voicemail for patient using  services, informing patient her appointment tomorrow has been cancelled and request she call back to reschedule.     Adelina Lux MSA, ATC  Certified Athletic Trainer

## 2024-09-13 NOTE — TELEPHONE ENCOUNTER
Additional voicemail left using  services informing patient provider is not in office and her appointment today is cancelled. Asked for return call to reschedule.     Adelina Lux MSA, ATC  Certified Athletic Trainer

## 2024-09-13 NOTE — TELEPHONE ENCOUNTER
Voicemail left for patient using  services informing patient her appointment today has been cancelled and she needs to reschedule.     Time is held on 9/27 for a 4pm appointment (3:45pm arrival).     Adelina Lux ATC

## 2024-10-07 ENCOUNTER — VIRTUAL VISIT (OUTPATIENT)
Dept: CARDIOLOGY | Facility: CLINIC | Age: 54
End: 2024-10-07
Attending: INTERNAL MEDICINE
Payer: COMMERCIAL

## 2024-10-07 VITALS — BODY MASS INDEX: 31.89 KG/M2 | HEIGHT: 63 IN | WEIGHT: 180 LBS

## 2024-10-07 DIAGNOSIS — E78.5 HYPERLIPIDEMIA LDL GOAL <100: ICD-10-CM

## 2024-10-07 PROCEDURE — 99213 OFFICE O/P EST LOW 20 MIN: CPT | Mod: 95 | Performed by: INTERNAL MEDICINE

## 2024-10-07 RX ORDER — PRAVASTATIN SODIUM 20 MG
20 TABLET ORAL DAILY
Qty: 90 TABLET | Refills: 3 | Status: SHIPPED | OUTPATIENT
Start: 2024-10-07

## 2024-10-07 RX ORDER — PRAVASTATIN SODIUM 20 MG
20 TABLET ORAL DAILY
Qty: 90 TABLET | Refills: 2 | Status: CANCELLED | OUTPATIENT
Start: 2024-10-07

## 2024-10-07 ASSESSMENT — PAIN SCALES - GENERAL: PAINLEVEL: NO PAIN (0)

## 2024-10-07 NOTE — PROGRESS NOTES
Patient is eligible for a video visit     Time     Start: 4.10 pm     Stop: 4.35 pm     Location     Patient: home      Dr Sepulveda: [facility]     Video system     [place]      HPI: I had the privilege to evaluate and examine Ms Shital Parrish, who is 53 yr female patient with heterozygeous hypercholesterolemia.  Patient denies chest pain, shortness of breath and intermittent claudication.  Patient is less mobile, because of her jpb as a psychological   - she is very sedentary during the week - she still try 1 or 2 days a week to go for long walk.  Patient has no palpitations.    PAST MEDICAL HISTORY:  Past Medical History:   Diagnosis Date    Edema 10/24/2013    Gastro-oesophageal reflux disease     Heavy periods 10/24/2013    Hyperthyroidism 9/26/2014    borderline-treated with a med for 6 months in [place] and then numbers improved. had a low TSH here and referred to [facility]. numbers there were borderline but normal and no antibodies. rec. 12/14-I123 uptake was increased so Graves dz dx'd. may do methimazole if repeat TFTs show hyperthyroid    Leiomyoma of uterus, unspecified 9/26/2014    Nipple discharge 09/14    right side only. neg mammo and right breast u/s. MRI pending    PONV (postoperative nausea and vomiting)     Post-menopause on HRT (hormone replacement therapy) 6/26/2021    Pure hypercholesterolemia 6/26/2021    Varicose veins of lower extremities with other complications 10/24/2013       CURRENT MEDICATIONS:  Current Outpatient Medications   Medication Sig Dispense Refill    cilostazol (PLETAL) 50 MG tablet Take 1 tablet (50 mg) by mouth at bedtime 90 tablet 1    EPIDUO FORTE 0.3-2.5 % gel Apply 0.3 Pump topically as needed      fexofenadine (ALLEGRA) 180 MG tablet Take 180 mg by mouth daily      methimazole (TAPAZOLE) 5 MG tablet TAKE 1/2 TABLET BY MOUTH DAILY      pravastatin (PRAVACHOL) 20 MG tablet Take 1 tablet (20 mg) by mouth daily. 90 tablet 3       PAST SURGICAL HISTORY:  Past Surgical History:    Procedure Laterality Date    APPENDECTOMY       SECTION      CHOLECYSTECTOMY, LAPOROSCOPIC  2012    Cholecystectomy, Laparoscopic    COLONOSCOPY N/A 2017    Procedure: COLONOSCOPY;  COLONOSCOPY;  Surgeon: Shannon Noel MD;  Location:  GI    COLONOSCOPY N/A 2019    Procedure: COLONOSCOPY;  Surgeon: Shannon Noel MD;  Location:  GI    HYSTERECTOMY SUPRACERVICAL N/A 2014    Procedure: HYSTERECTOMY SUPRACERVICAL;  Surgeon: Shauna Arceo MD;  Location:  OR [facility] and LSO done by Adis/ Leora. started as laparoscopic but converted to open due to adhesions    LAPAROSCOPIC CYSTECTOMY OVARIAN (ONCOLOGY)      right. benign cyst. partial oopherectomy in [facility]    LAPAROSCOPIC SALPINGO-OOPHORECTOMY  14    MYOMECTOMY UTERUS  2008    hysteroscopic    TONSILLECTOMY  1979       ALLERGIES     Allergies   Allergen Reactions    Lactose Intolerance (Gi) GI Disturbance    Penicillins     Seasonal Allergies     Amoxicillin Rash       FAMILY HISTORY:  Family History   Problem Relation Age of Onset    Diabetes Mother     Cancer Mother         melanoma    Colon Polyps Mother     Coronary Artery Disease Father     No Known Problems Sister     Diabetes Maternal Grandmother     Breast Cancer Maternal Aunt     Thyroid Disease Cousin     No Known Problems Brother     No Known Problems Maternal Grandfather     No Known Problems Paternal Grandmother     Breast Cancer Other         Maternal Sister    Asthma No family hx of        SOCIAL HISTORY:  Social History     Socioeconomic History    Marital status:      Spouse name: Marianna    Number of children: 2    Years of education: 16    Highest education level: None   Occupational History    Occupation: Program Leader     Comment: [employer]     Employer: [employer]    Tobacco Use    Smoking status: Former     Current packs/day: 0.00     Types: Cigarettes    Smokeless tobacco: Never    Tobacco comments:     Quit 31+ years  ago.   Vaping Use    Vaping status: Never Used   Substance and Sexual Activity    Alcohol use: Yes     Comment: Social Drinking    Drug use: No    Sexual activity: Yes     Partners: Male     Birth control/protection: None     Comment: hysterectomy   Other Topics Concern    Blood Transfusions No    Special Diet No    Exercise No    Seat Belt Yes    Parent/sibling w/ CABG, MI or angioplasty before 65F 55M? No   Social History Narrative    The patient was born in [facility]. She eats fruits and vegetables every day. She takes calcium supplement and vitamin D.        No advanced care directives on file         Social Determinants of Health     Financial Resource Strain: Low Risk  (6/7/2024)    Financial Resource Strain     Within the past 12 months, have you or your family members you live with been unable to get utilities (heat, electricity) when it was really needed?: No   Food Insecurity: Low Risk  (6/7/2024)    Food Insecurity     Within the past 12 months, did you worry that your food would run out before you got money to buy more?: No     Within the past 12 months, did the food you bought just not last and you didn t have money to get more?: No   Transportation Needs: Low Risk  (6/7/2024)    Transportation Needs     Within the past 12 months, has lack of transportation kept you from medical appointments, getting your medicines, non-medical meetings or appointments, work, or from getting things that you need?: No   Physical Activity: Unknown (6/7/2024)    Exercise Vital Sign     Days of Exercise per Week: 3 days   Stress: No Stress Concern Present (6/7/2024)    [employer] [place] [employer] - Occupational Stress Questionnaire     Feeling of Stress : Not at all   Social Connections: Unknown (6/7/2024)    Social Connection and Isolation Panel [NHANES]     Frequency of Social Gatherings with Friends and Family: More than three times a week   Interpersonal Safety: Low Risk  (10/20/2023)    Interpersonal  "Safety     Do you feel physically and emotionally safe where you currently live?: Yes     Within the past 12 months, have you been hit, slapped, kicked or otherwise physically hurt by someone?: No     Within the past 12 months, have you been humiliated or emotionally abused in other ways by your partner or ex-partner?: No   Housing Stability: Low Risk  (6/7/2024)    Housing Stability     Do you have housing? : Yes     Are you worried about losing your housing?: No       ROS:   Constitutional: No fever, chills, or sweats. No weight gain/loss   ENT: No visual disturbance, ear ache, epistaxis, sore throat  Allergies/Immunologic: Negative.   Respiratory: No cough, hemoptysia  Cardiovascular: As per HPI  GI: No nausea, vomiting, hematemesis, melena, or hematochezia  : No urinary frequency, dysuria, or hematuria  Integument: Negative  Psychiatric: Negative  Neuro: Negative  Endocrinology: Negative   Musculoskeletal: Negative    EXAM:  Ht 1.6 m (5' 3\")   Wt 81.6 kg (180 lb)   LMP 10/26/2014   BMI 31.89 kg/m    This is a video visit - no physican exam and no BP  Kirstin BP   Systolic 120-130  Diastolic 70-80       Labs:  LIPID RESULTS:  Lab Results   Component Value Date    CHOL 192 06/07/2024    CHOL 268 (H) 06/23/2021    HDL 47 (L) 06/07/2024    HDL 63 06/23/2021     (H) 06/07/2024     (H) 06/23/2021    TRIG 140 06/07/2024    TRIG 119 06/23/2021    CHOLHDLRATIO 3.5 09/11/2015    NHDL 145 (H) 06/07/2024    NHDL 205 (H) 06/23/2021       LIVER ENZYME RESULTS:  Lab Results   Component Value Date    AST 34 06/07/2024    AST 20 05/20/2021    ALT 53 (H) 06/07/2024    ALT 29 05/20/2021       CBC RESULTS:  Lab Results   Component Value Date    WBC 5.5 06/07/2024    WBC 7.2 05/20/2021    RBC 4.45 06/07/2024    RBC 4.35 05/20/2021    HGB 13.8 06/07/2024    HGB 13.6 05/20/2021    HCT 39.4 06/07/2024    HCT 39.1 05/20/2021    MCV 89 06/07/2024    MCV 90 05/20/2021    MCH 31.0 06/07/2024    MCH 31.3 05/20/2021    MCHC " 35.0 06/07/2024    MCHC 34.8 05/20/2021    RDW 11.7 06/07/2024    RDW 12.3 05/20/2021     06/07/2024     05/20/2021       BMP RESULTS:  Lab Results   Component Value Date     06/07/2024     05/20/2021    POTASSIUM 4.4 06/07/2024    POTASSIUM 4.3 08/11/2022    POTASSIUM 4.3 05/20/2021    CHLORIDE 104 06/07/2024    CHLORIDE 106 08/11/2022    CHLORIDE 106 05/20/2021    CO2 26 06/07/2024    CO2 24 08/11/2022    CO2 24 05/20/2021    ANIONGAP 9 06/07/2024    ANIONGAP 7 08/11/2022    ANIONGAP 6 05/20/2021    GLC 93 06/07/2024    GLC 85 08/11/2022    GLC 72 05/20/2021    BUN 14.9 06/07/2024    BUN 16 08/11/2022    BUN 10 05/20/2021    CR 0.66 06/07/2024    CR 0.60 05/20/2021    GFRESTIMATED >90 06/07/2024    GFRESTIMATED >90 05/20/2021    GFRESTBLACK >90 05/20/2021    BUZZ 9.4 06/07/2024    BUZZ 8.9 05/20/2021        A1C RESULTS:  Lab Results   Component Value Date    A1C 5.5 06/07/2024    A1C 5.2 11/11/2020           Assessment and Plan:          Expand All Collapse All       Patient is eligible for a video visit     Time     Start: 6.30 pm     Stop: 6.55 pm     Location     Patient: home      Dr Sepulveda: [facility]     Video system     [facility]        HPI:   I had the privilege to evaluate and examine Ms Shital Parrish, who is 52 yr female patient with heterozygeous hypercholesterolemia.  Patient denies chest pain, shortness of breath and intermittent claudication.  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  Patient has no palpitations.     PAST MEDICAL HISTORY:  Past Medical History        Past Medical History:   Diagnosis Date    Edema 10/24/2013    Gastro-oesophageal reflux disease      Heavy periods 10/24/2013    Hyperthyroidism 9/26/2014     borderline-treated with a med for 6 months in Located within [facility] and then numbers improved. had a low TSH here and referred to [facility]. numbers there were borderline but  normal and no antibodies. rec. -I123 uptake was increased so Graves dz dx'd. may do methimazole if repeat TFTs show hyperthyroid    Leiomyoma of uterus, unspecified 2014    Nipple discharge      right side only. neg mammo and right breast u/s. MRI pending    PONV (postoperative nausea and vomiting)      Post-menopause on HRT (hormone replacement therapy) 2021    Pure hypercholesterolemia 2021    Varicose veins of lower extremities with other complications 10/24/2013            CURRENT MEDICATIONS:  Current Outpatient Prescriptions          Current Outpatient Medications   Medication Sig Dispense Refill    estradiol (VIVELLE-DOT) 0.075 MG/24HR BIW patch Place 1 patch onto the skin twice a week 24 patch 3    fexofenadine (ALLEGRA) 180 MG tablet Take 180 mg by mouth daily        methimazole (TAPAZOLE) 5 MG tablet TAKE 1/2 TABLET BY MOUTH DAILY        pravastatin (PRAVACHOL) 10 MG tablet Take 1 tablet (10 mg) by mouth daily 90 tablet 3    cholecalciferol (VITAMIN D3) 125 mcg (5000 units) capsule  (Patient not taking: Reported on 2023)        ciclopirox (PENLAC) 8 % external solution Apply 8 Application topically as needed To nails nails (Patient not taking: Reported on 8/10/2022)        EPIDUO FORTE 0.3-2.5 % gel Apply 0.3 Pump topically as needed (Patient not taking: Reported on 8/10/2022)        olopatadine (PATANOL) 0.1 % ophthalmic solution INSTILL 1 DROP INTO BOTH EYES TWICE A DAY (Patient not taking: Reported on 8/10/2022) 5 mL 11            PAST SURGICAL HISTORY:  Past Surgical History         Past Surgical History:   Procedure Laterality Date    APPENDECTOMY        SECTION        CHOLECYSTECTOMY, LAPOROSCOPIC   2012     Cholecystectomy, Laparoscopic    COLONOSCOPY N/A 2017     Procedure: COLONOSCOPY;  COLONOSCOPY;  Surgeon: Shannon Noel MD;  Location:  GI    COLONOSCOPY N/A 2019     Procedure: COLONOSCOPY;  Surgeon: Shannon Noel MD;   Location:  GI    HYSTERECTOMY SUPRACERVICAL N/A 11/11/2014     Procedure: HYSTERECTOMY SUPRACERVICAL;  Surgeon: Shauna Arceo MD;  Location: [facility] and LSO done by Adis/ Leora. started as laparoscopic but converted to open due to adhesions    LAPAROSCOPIC CYSTECTOMY OVARIAN (ONCOLOGY)         right. benign cyst. partial oopherectomy in [facility]    LAPAROSCOPIC SALPINGO-OOPHORECTOMY   11/11/14    MYOMECTOMY UTERUS   2008     hysteroscopic    TONSILLECTOMY   1979            ALLERGIES     Allergies        Allergies   Allergen Reactions    Penicillins      Seasonal Allergies      Amoxicillin Rash            FAMILY HISTORY:  Family History         Family History   Problem Relation Age of Onset    Diabetes Mother      Cancer Mother           melanoma    Colon Polyps Mother      Coronary Artery Disease Father      No Known Problems Sister      Diabetes Maternal Grandmother      Breast Cancer Maternal Aunt      Thyroid Disease Cousin      No Known Problems Brother      No Known Problems Maternal Grandfather      No Known Problems Paternal Grandmother      Asthma No family hx of              SOCIAL HISTORY:  Social History            Socioeconomic History    Marital status:        Spouse name: Marianna    Number of children: 2    Years of education: 16    Highest education level: None   Occupational History    Occupation: Program Leader       Comment: [employer]       Employer: [employer]    Tobacco Use    Smoking status: Former       Packs/day: 0.00       Years: 0.00       Pack years: 0.00       Types: Cigarettes    Smokeless tobacco: Never    Tobacco comments:       Quit 31+ years ago.   Substance and Sexual Activity    Alcohol use: Yes       Alcohol/week: 0.0 standard drinks       Comment: Socially     Drug use: No    Sexual activity: Yes       Partners: Male       Birth control/protection: None       Comment: hysterectomy   Other Topics Concern    Blood Transfusions No    Special Diet No     Exercise No    Seat Belt Yes    Parent/sibling w/ CABG, MI or angioplasty before 65F 55M? No   Social History Narrative     The patient was born in [place]. She eats fruits and vegetables every day. She takes calcium supplement and vitamin D.           No advanced care directives on file              ROS:   Constitutional: No fever, chills, or sweats. No weight gain/loss   ENT: No visual disturbance, ear ache, epistaxis, sore throat  Allergies/Immunologic: Negative.   Respiratory: No cough, hemoptysia  Cardiovascular: As per HPI  GI: No nausea, vomiting, hematemesis, melena, or hematochezia  : No urinary frequency, dysuria, or hematuria  Integument: Negative  Psychiatric: Negative  Neuro: Negative  Endocrinology: Negative   Musculoskeletal: Negative     EXAM:  LMP 10/26/2014      Video visit.     Labs:  LIPID RESULTS:        Lab Results   Component Value Date     CHOL 184 03/24/2023     CHOL 268 (H) 06/23/2021     HDL 49 (L) 03/24/2023     HDL 63 06/23/2021      (H) 03/24/2023      (H) 03/24/2023      (H) 06/23/2021     TRIG 73 03/24/2023     TRIG 119 06/23/2021     CHOLHDLRATIO 3.5 09/11/2015     NHDL 135 (H) 03/24/2023     NHDL 205 (H) 06/23/2021         LIVER ENZYME RESULTS:        Lab Results   Component Value Date     AST 17 03/24/2023     AST 20 05/20/2021     ALT 14 03/24/2023     ALT 29 05/20/2021         CBC RESULTS:        Lab Results   Component Value Date     WBC 7.2 05/20/2021     RBC 4.35 05/20/2021     HGB 13.6 05/20/2021     HCT 39.1 05/20/2021     MCV 90 05/20/2021     MCH 31.3 05/20/2021     MCHC 34.8 05/20/2021     RDW 12.3 05/20/2021      05/20/2021         BMP RESULTS:        Lab Results   Component Value Date      03/24/2023      05/20/2021     POTASSIUM 4.3 03/24/2023     POTASSIUM 4.3 08/11/2022     POTASSIUM 4.3 05/20/2021     CHLORIDE 103 03/24/2023     CHLORIDE 106 08/11/2022     CHLORIDE 106 05/20/2021     CO2 23 03/24/2023     CO2 24  08/11/2022     CO2 24 05/20/2021     ANIONGAP 13 03/24/2023     ANIONGAP 7 08/11/2022     ANIONGAP 6 05/20/2021     GLC 98 03/24/2023     GLC 85 08/11/2022     GLC 72 05/20/2021     BUN 18.2 03/24/2023     BUN 16 08/11/2022     BUN 10 05/20/2021     CR 0.68 03/24/2023     CR 0.60 05/20/2021     GFRESTIMATED >90 03/24/2023     GFRESTIMATED >90 05/20/2021     GFRESTBLACK >90 05/20/2021           Assessment and Plan:      We discussed the results with patient.  We discussed the importance of a heart healthy lifestyle and diet.      We continue with same medical therapy.  Follow-up within 1 year with labs.    David Sepulveda MD, PhD  Professor of Medicine  Division of Cardiology   CC  Patient Care Team:  Tim Hill MD as PCP - General (Family Practice)  Tim Hill MD as Assigned PCP  Gricel Laws RN as Specialty Care Coordinator (Cardiology)  Carlos Alberto Alejo MD as Luke Cat MD as Assigned Musculoskeletal Provider  Geovanna Gonsales RD as Diabetes Educator (Dietitian, Registered)  Shauna Arceo MD as Assigned OBGYN Provider  Evelio Simons MD as Assigned Heart and Vascular Provider  SELF, REFERRED

## 2024-10-07 NOTE — NURSING NOTE
Current patient location: [address] [place] [address]  [place] [address]    Is the patient currently in the state of [place]? YES    Visit mode:VIDEO    If the visit is dropped, the patient can be reconnected by: VIDEO VISIT: Text to cell phone:   Telephone Information:   Mobile [phone]       Will anyone else be joining the visit? NO  (If patient encounters technical issues they should call [phone]    Are changes needed to the allergy or medication list? No    Are refills needed on medications prescribed by this physician? YES    Rooming Documentation:  Questionnaire(s) completed    Reason for visit: BRENDAN DENNIS

## 2024-10-07 NOTE — LETTER
10/7/2024      RE: Shital Parrish  [address]  [place] [address]       Dear Colleague,    Thank you for the opportunity to participate in the care of your patient, Shital Parrish, at the [facility] [place] [facility]. Please see a copy of my visit note below.    Patient is eligible for a video visit     Time     Start: 4.10 pm     Stop: 4.35 pm     Location     Patient[place] home      [place] [facility] [place]     [place] [place]      HPI: I had the privilege to evaluate and examine Ms Shital Parrish, who is 53 yr female patient with heterozygeous hypercholesterolemia.  Patient denies chest pain, shortness of breath and intermittent claudication.  Patient is less mobile, because of her jpb as a psychological   - she is very sedentary during the week - she still try 1 or 2 days a week to go for long walk.  Patient has no palpitations.    PAST MEDICAL HISTORY:  Past Medical History:   Diagnosis Date     Edema 10/24/2013     Gastro-oesophageal reflux disease      Heavy periods 10/24/2013     Hyperthyroidism 9/26/2014    borderline-treated with a med for 6 months in [place] and then numbers improved. had a low TSH here and referred to [facility]. numbers there were borderline but normal and no antibodies. rec. 12/14-I123 uptake was increased so Graves dz dx'd. may do methimazole if repeat TFTs show hyperthyroid     Leiomyoma of uterus, unspecified 9/26/2014     Nipple discharge 09/14    right side only. neg mammo and right breast u/s. MRI pending     PONV (postoperative nausea and vomiting)      Post-menopause on HRT (hormone replacement therapy) 6/26/2021     Pure hypercholesterolemia 6/26/2021     Varicose veins of lower extremities with other complications 10/24/2013       CURRENT MEDICATIONS:  Current Outpatient Medications   Medication Sig Dispense Refill     cilostazol (PLETAL) 50 MG tablet Take 1 tablet (50 mg) by mouth at bedtime 90 tablet 1      EPIDUO FORTE 0.3-2.5 % gel Apply 0.3 Pump topically as needed       fexofenadine (ALLEGRA) 180 MG tablet Take 180 mg by mouth daily       methimazole (TAPAZOLE) 5 MG tablet TAKE 1/2 TABLET BY MOUTH DAILY       pravastatin (PRAVACHOL) 20 MG tablet Take 1 tablet (20 mg) by mouth daily. 90 tablet 3       PAST SURGICAL HISTORY:  Past Surgical History:   Procedure Laterality Date     APPENDECTOMY        SECTION       CHOLECYSTECTOMY, LAPOROSCOPIC  2012    Cholecystectomy, Laparoscopic     COLONOSCOPY N/A 2017    Procedure: COLONOSCOPY;  COLONOSCOPY;  Surgeon: Shannon Noel MD;  Location:  GI     COLONOSCOPY N/A 2019    Procedure: COLONOSCOPY;  Surgeon: Shannon Noel MD;  Location:  GI     HYSTERECTOMY SUPRACERVICAL N/A 2014    Procedure: HYSTERECTOMY SUPRACERVICAL;  Surgeon: Shauna Arceo MD;  Location:  OR [facility] and LSO done by Adis/ Leora. started as laparoscopic but converted to open due to adhesions     LAPAROSCOPIC CYSTECTOMY OVARIAN (ONCOLOGY)      right. benign cyst. partial o[facility]herectomy in [facility]     LAPAROSCOPIC SALPINGO-OOPHORECTOMY  14     MYOMECTOMY UTERUS      hysteroscopic     TONSILLECTOMY  1979       ALLERGIES     Allergies   Allergen Reactions     Lactose Intolerance (Gi) GI Disturbance     Penicillins      Seasonal Allergies      Amoxicillin Rash       FAMILY HISTORY:  Family History   Problem Relation Age of Onset     Diabetes Mother      Cancer Mother         melanoma     Colon Polyps Mother      Coronary Artery Disease Father      No Known Problems Sister      Diabetes Maternal Grandmother      Breast Cancer Maternal Aunt      Thyroid Disease Cousin      No Known Problems Brother      No Known Problems Maternal Grandfather      No Known Problems Paternal Grandmother      Breast Cancer Other         Maternal Sister     Asthma No family hx of        SOCIAL HISTORY:  Social History     Socioeconomic History     Marital status:       Spouse name: Marianna     Number of children: 2     Years of education: 16     Highest education level: None   Occupational History     Occupation: Program Leader     Comment: [employer]     Employer: [employer]    Tobacco Use     Smoking status: Former     Current packs/day: 0.00     Types: Cigarettes     Smokeless tobacco: Never     Tobacco comments:     Quit 31+ years ago.   Vaping Use     Vaping status: Never Used   Substance and Sexual Activity     Alcohol use: Yes     Comment: Social Drinking     Drug use: No     Sexual activity: Yes     Partners: Male     Birth control/protection: None     Comment: hysterectomy   Other Topics Concern     Blood Transfusions No     Special Diet No     Exercise No     Seat Belt Yes     Parent/sibling w/ CABG, MI or angioplasty before 65F 55M? No   Social History Narrative    The patient was born in [facility]. She eats fruits and vegetables every day. She takes calcium supplement and vitamin D.        No advanced care directives on file         Social Determinants of Health     Financial Resource Strain: Low Risk  (6/7/2024)    Financial Resource Strain      Within the past 12 months, have you or your family members you live with been unable to get utilities (heat, electricity) when it was really needed?: No   Food Insecurity: Low Risk  (6/7/2024)    Food Insecurity      Within the past 12 months, did you worry that your food would run out before you got money to buy more?: No      Within the past 12 months, did the food you bought just not last and you didn t have money to get more?: No   Transportation Needs: Low Risk  (6/7/2024)    Transportation Needs      Within the past 12 months, has lack of transportation kept you from medical appointments, getting your medicines, non-medical meetings or appointments, work, or from getting things that you need?: No   Physical Activity: Unknown (6/7/2024)    Exercise Vital Sign      Days of Exercise per Week: 3 days  "  Stress: No Stress Concern Present (6/7/2024)    [place] [employer] - Occupational Stress Questionnaire      Feeling of Stress : Not at all   Social Connections: Unknown (6/7/2024)    Social Connection and Isolation Panel [NHANES]      Frequency of Social Gatherings with Friends and Family: More than three times a week   Interpersonal Safety: Low Risk  (10/20/2023)    Interpersonal Safety      Do you feel physically and emotionally safe where you currently live?: Yes      Within the past 12 months, have you been hit, slapped, kicked or otherwise physically hurt by someone?: No      Within the past 12 months, have you been humiliated or emotionally abused in other ways by your partner or ex-partner?: No   Housing Stability: Low Risk  (6/7/2024)    Housing Stability      Do you have housing? : Yes      Are you worried about losing your housing?: No       ROS:   Constitutional: No fever, chills, or sweats. No weight gain/loss   ENT: No visual disturbance, ear ache, epistaxis, sore throat  Allergies/Immunologic: Negative.   Respiratory: No cough, hemoptysia  Cardiovascular: As per HPI  GI: No nausea, vomiting, hematemesis, melena, or hematochezia  : No urinary frequency, dysuria, or hematuria  Integument: Negative  Psychiatric: Negative  Neuro: Negative  Endocrinology: Negative   Musculoskeletal: Negative    EXAM:  Ht 1.6 m (5' 3\")   Wt 81.6 kg (180 lb)   LMP 10/26/2014   BMI 31.89 kg/m    This is a video visit - no physican exam and no BP  Kirstin BP   Systolic 120-130  Diastolic 70-80       Labs:  LIPID RESULTS:  Lab Results   Component Value Date    CHOL 192 06/07/2024    CHOL 268 (H) 06/23/2021    HDL 47 (L) 06/07/2024    HDL 63 06/23/2021     (H) 06/07/2024     (H) 06/23/2021    TRIG 140 06/07/2024    TRIG 119 06/23/2021    CHOLHDLRATIO 3.5 09/11/2015    NHDL 145 (H) 06/07/2024    NHDL 205 (H) 06/23/2021       LIVER ENZYME RESULTS:  Lab Results   Component Value Date    AST 34 " 06/07/2024    AST 20 05/20/2021    ALT 53 (H) 06/07/2024    ALT 29 05/20/2021       CBC RESULTS:  Lab Results   Component Value Date    WBC 5.5 06/07/2024    WBC 7.2 05/20/2021    RBC 4.45 06/07/2024    RBC 4.35 05/20/2021    HGB 13.8 06/07/2024    HGB 13.6 05/20/2021    HCT 39.4 06/07/2024    HCT 39.1 05/20/2021    MCV 89 06/07/2024    MCV 90 05/20/2021    MCH 31.0 06/07/2024    MCH 31.3 05/20/2021    MCHC 35.0 06/07/2024    MCHC 34.8 05/20/2021    RDW 11.7 06/07/2024    RDW 12.3 05/20/2021     06/07/2024     05/20/2021       BMP RESULTS:  Lab Results   Component Value Date     06/07/2024     05/20/2021    POTASSIUM 4.4 06/07/2024    POTASSIUM 4.3 08/11/2022    POTASSIUM 4.3 05/20/2021    CHLORIDE 104 06/07/2024    CHLORIDE 106 08/11/2022    CHLORIDE 106 05/20/2021    CO2 26 06/07/2024    CO2 24 08/11/2022    CO2 24 05/20/2021    ANIONGAP 9 06/07/2024    ANIONGAP 7 08/11/2022    ANIONGAP 6 05/20/2021    GLC 93 06/07/2024    GLC 85 08/11/2022    GLC 72 05/20/2021    BUN 14.9 06/07/2024    BUN 16 08/11/2022    BUN 10 05/20/2021    CR 0.66 06/07/2024    CR 0.60 05/20/2021    GFRESTIMATED >90 06/07/2024    GFRESTIMATED >90 05/20/2021    GFRESTBLACK >90 05/20/2021    BUZZ 9.4 06/07/2024    BUZZ 8.9 05/20/2021        A1C RESULTS:  Lab Results   Component Value Date    A1C 5.5 06/07/2024    A1C 5.2 11/11/2020           Assessment and Plan:          Expand All Collapse All       Patient is eligible for a video visit     Time     Start: 6.30 pm     Stop: 6.55 pm     Location     Patient: home      [place] [facility] [place]     [facility]        HPI:   I had the privilege to evaluate and examine Ms Shital Parrish, who is 52 yr female patient with heterozygeous hypercholesterolemia.  Patient denies chest pain, shortness of breath and intermittent claudication.  Patient is less mobile, because it is the last year of her studies and she has to write a thesis and therefore not too much time to go for  long walks.  Patient has no palpitations.     PAST MEDICAL HISTORY:  Past Medical History        Past Medical History:   Diagnosis Date     Edema 10/24/2013     Gastro-oesophageal reflux disease       Heavy periods 10/24/2013     Hyperthyroidism 9/26/2014     borderline-treated with a med for 6 months in [place] and then numbers improved. had a low TSH here and referred to [facility]. numbers there were borderline but normal and no antibodies. rec. 12/14-I123 uptake was increased so Graves dz dx'd. may do methimazole if repeat TFTs show hyperthyroid     Leiomyoma of uterus, unspecified 9/26/2014     Nipple discharge 09/14     right side only. neg mammo and right breast u/s. MRI pending     PONV (postoperative nausea and vomiting)       Post-menopause on HRT (hormone replacement therapy) 6/26/2021     Pure hypercholesterolemia 6/26/2021     Varicose veins of lower extremities with other complications 10/24/2013            CURRENT MEDICATIONS:  Current Outpatient Prescriptions          Current Outpatient Medications   Medication Sig Dispense Refill     estradiol (VIVELLE-DOT) 0.075 MG/24HR BIW patch Place 1 patch onto the skin twice a week 24 patch 3     fexofenadine (ALLEGRA) 180 MG tablet Take 180 mg by mouth daily         methimazole (TAPAZOLE) 5 MG tablet TAKE 1/2 TABLET BY MOUTH DAILY         pravastatin (PRAVACHOL) 10 MG tablet Take 1 tablet (10 mg) by mouth daily 90 tablet 3     cholecalciferol (VITAMIN D3) 125 mcg (5000 units) capsule  (Patient not taking: Reported on 1/2/2023)         ciclopirox (PENLAC) 8 % external solution Apply 8 Application topically as needed To nails nails (Patient not taking: Reported on 8/10/2022)         EPIDUO FORTE 0.3-2.5 % gel Apply 0.3 Pump topically as needed (Patient not taking: Reported on 8/10/2022)         olopatadine (PATANOL) 0.1 % ophthalmic solution INSTILL 1 DROP INTO BOTH EYES TWICE A DAY (Patient not taking: Reported on 8/10/2022) 5 mL 11            PAST SURGICAL  HISTORY:  Past Surgical History         Past Surgical History:   Procedure Laterality Date     APPENDECTOMY         SECTION         CHOLECYSTECTOMY, LAPOROSCOPIC   2012     Cholecystectomy, Laparoscopic     COLONOSCOPY N/A 2017     Procedure: COLONOSCOPY;  COLONOSCOPY;  Surgeon: Shannon Noel MD;  Location:  GI     COLONOSCOPY N/A 2019     Procedure: COLONOSCOPY;  Surgeon: Shannon Noel MD;  Location:  GI     HYSTERECTOMY SUPRACERVICAL N/A 2014     Procedure: HYSTERECTOMY SUPRACERVICAL;  Surgeon: Shauna Arceo MD;  Location:  OR [facility] done by Adis/ [facility]. started as laparoscopic but converted to open due to adhesions     LAPAROSCOPIC CYSTECTOMY OVARIAN (ONCOLOGY)         right. benign cyst. partial oopherectomy in [facility]     LAPAROSCOPIC SALPINGO-OOPHORECTOMY   14     MYOMECTOMY UTERUS        hysteroscopic     TONSILLECTOMY   1979            ALLERGIES     Allergies        Allergies   Allergen Reactions     Penicillins       Seasonal Allergies       Amoxicillin Rash            FAMILY HISTORY:  Family History         Family History   Problem Relation Age of Onset     Diabetes Mother       Cancer Mother           melanoma     Colon Polyps Mother       Coronary Artery Disease Father       No Known Problems Sister       Diabetes Maternal Grandmother       Breast Cancer Maternal Aunt       Thyroid Disease Cousin       No Known Problems Brother       No Known Problems Maternal Grandfather       No Known Problems Paternal Grandmother       Asthma No family hx of              SOCIAL HISTORY:  Social History            Socioeconomic History     Marital status:        Spouse name: Marianna     Number of children: 2     Years of education: 16     Highest education level: None   Occupational History     Occupation: Program Leader       Comment: [employer]       Employer: [employer]    Tobacco Use     Smoking status: Former        Packs/day: 0.00       Years: 0.00       Pack years: 0.00       Types: Cigarettes     Smokeless tobacco: Never     Tobacco comments:       Quit 31+ years ago.   Substance and Sexual Activity     Alcohol use: Yes       Alcohol/week: 0.0 standard drinks       Comment: Socially      Drug use: No     Sexual activity: Yes       Partners: Male       Birth control/protection: None       Comment: hysterectomy   Other Topics Concern     Blood Transfusions No     Special Diet No     Exercise No     Seat Belt Yes     Parent/sibling w/ CABG, MI or angioplasty before 65F 55M? No   Social History Narrative     The patient was born in [facility]. She eats fruits and vegetables every day. She takes calcium supplement and vitamin D.           No advanced care directives on file              ROS:   Constitutional: No fever, chills, or sweats. No weight gain/loss   ENT: No visual disturbance, ear ache, epistaxis, sore throat  Allergies/Immunologic: Negative.   Respiratory: No cough, hemoptysia  Cardiovascular: As per HPI  GI: No nausea, vomiting, hematemesis, melena, or hematochezia  : No urinary frequency, dysuria, or hematuria  Integument: Negative  Psychiatric: Negative  Neuro: Negative  Endocrinology: Negative   Musculoskeletal: Negative     EXAM:  LMP 10/26/2014      Video visit.     Labs:  LIPID RESULTS:        Lab Results   Component Value Date     CHOL 184 03/24/2023     CHOL 268 (H) 06/23/2021     HDL 49 (L) 03/24/2023     HDL 63 06/23/2021      (H) 03/24/2023      (H) 03/24/2023      (H) 06/23/2021     TRIG 73 03/24/2023     TRIG 119 06/23/2021     CHOLHDLRATIO 3.5 09/11/2015     NHDL 135 (H) 03/24/2023     NHDL 205 (H) 06/23/2021         LIVER ENZYME RESULTS:        Lab Results   Component Value Date     AST 17 03/24/2023     AST 20 05/20/2021     ALT 14 03/24/2023     ALT 29 05/20/2021         CBC RESULTS:        Lab Results   Component Value Date     WBC 7.2 05/20/2021     RBC 4.35 05/20/2021      HGB 13.6 05/20/2021     HCT 39.1 05/20/2021     MCV 90 05/20/2021     MCH 31.3 05/20/2021     MCHC 34.8 05/20/2021     RDW 12.3 05/20/2021      05/20/2021         BMP RESULTS:        Lab Results   Component Value Date      03/24/2023      05/20/2021     POTASSIUM 4.3 03/24/2023     POTASSIUM 4.3 08/11/2022     POTASSIUM 4.3 05/20/2021     CHLORIDE 103 03/24/2023     CHLORIDE 106 08/11/2022     CHLORIDE 106 05/20/2021     CO2 23 03/24/2023     CO2 24 08/11/2022     CO2 24 05/20/2021     ANIONGAP 13 03/24/2023     ANIONGAP 7 08/11/2022     ANIONGAP 6 05/20/2021     GLC 98 03/24/2023     GLC 85 08/11/2022     GLC 72 05/20/2021     BUN 18.2 03/24/2023     BUN 16 08/11/2022     BUN 10 05/20/2021     CR 0.68 03/24/2023     CR 0.60 05/20/2021     GFRESTIMATED >90 03/24/2023     GFRESTIMATED >90 05/20/2021     GFRESTBLACK >90 05/20/2021           Assessment and Plan:      We discussed the results with patient.  We discussed the importance of a heart healthy lifestyle and diet.      We continue with same medical therapy.  Follow-up within 1 year with labs.    David Sepulveda MD, PhD  Professor of Medicine  Division of Cardiology   CC  Patient Care Team:  Tim Hill MD as PCP - General (Family Practice)  Tim Hill MD as Assigned PCP  Gricel Laws, RN as Specialty Care Coordinator (Cardiology)  Carlos Alberto Alejo MD as Luke Cat MD as Assigned Musculoskeletal Provider  Geovanna Gonsales RD as Diabetes Educator (Dietitian, Registered)  Shauna Arceo MD as Assigned OBGYN Provider  Evelio Simons MD as Assigned Heart and Vascular Provider  SELF, REFERRED             Please do not hesitate to contact me if you have any questions/concerns.     Sincerely,     David Sepulveda MD

## 2024-10-07 NOTE — PATIENT INSTRUCTIONS
October 7, 2024    Cardiology Provider You Saw During Your Visit: Dr. Sepulveda      Medication Changes: None      Follow Up: Repeat fasting labs in 6 months    Follow the American Heart Association Diet and Lifestyle Recommendations:  -Limit saturated fat, trans fat, sodium, red meat, sweets and sugar-sweetened beverages. If you choose to eat red meat, compare labels and select the leanest cuts available.  -Aim for at least 150 minutes of moderate physical activity or 75 minutes of vigorous physical activity - or an equal combination of both - each week.      To Reach Us:  -During business hours: [phone], press option # 1 to schedule an appointment or to leave a message for your care team.     -After hours, weekends or holidays: [phone], press option #4 and ask to speak to the on-call cardiologist. Inform them you are a patient at the [facility].        **If you have a cardiac device, please make sure you schedule an in-person device check just prior to your cardiology provider appointments**        Gricel Laws RN  Cardiology Care Coordinator - General Cardiology  [facility]

## 2024-10-08 ENCOUNTER — TELEPHONE (OUTPATIENT)
Dept: CARDIOLOGY | Facility: CLINIC | Age: 54
End: 2024-10-08
Payer: COMMERCIAL

## 2024-10-08 NOTE — TELEPHONE ENCOUNTER
10/8 Left Voicemail (1st Attempt) and Sent Mychart (1st Attempt) for the patient to call back and schedule the following:    Appointment type: labs (fasting)  Provider: Derick  Return date: 4/7/2025  Specialty phone number: [phone] opt 1   Additional appointment(s) needed: n/a  Additonal Notes: n/a

## 2024-10-14 ENCOUNTER — TELEPHONE (OUTPATIENT)
Dept: CARDIOLOGY | Facility: CLINIC | Age: 54
End: 2024-10-14
Payer: COMMERCIAL

## 2024-10-14 NOTE — TELEPHONE ENCOUNTER
----- Message from Gricel TYLER sent at 10/7/2024  4:36 PM CDT -----  Please help schedule fasting labs in 6 months. Thx!

## 2024-10-16 ENCOUNTER — TELEPHONE (OUTPATIENT)
Dept: CARDIOLOGY | Facility: CLINIC | Age: 54
End: 2024-10-16
Payer: COMMERCIAL

## 2024-10-16 NOTE — TELEPHONE ENCOUNTER
Left Voicemail (2nd Attempt) for the patient to call back and schedule the following:    Appointment type: FASTING LABS  Provider: none  Return date: April 2025  Specialty phone number: [phone] opt 1  Additional appointment(s) needed: n/a  Additonal Notes: n/a

## 2024-12-06 ENCOUNTER — TRANSFERRED RECORDS (OUTPATIENT)
Dept: MULTI SPECIALTY CLINIC | Facility: CLINIC | Age: 54
End: 2024-12-06

## 2024-12-18 DIAGNOSIS — K21.00 GASTROESOPHAGEAL REFLUX DISEASE WITH ESOPHAGITIS WITHOUT HEMORRHAGE: ICD-10-CM

## 2024-12-19 RX ORDER — SUCRALFATE 1 G/1
1 TABLET ORAL 2 TIMES DAILY
Qty: 60 TABLET | Refills: 0 | Status: SHIPPED | OUTPATIENT
Start: 2024-12-19

## 2024-12-30 ENCOUNTER — VIRTUAL VISIT (OUTPATIENT)
Dept: FAMILY MEDICINE | Facility: CLINIC | Age: 54
End: 2024-12-30
Payer: COMMERCIAL

## 2024-12-30 DIAGNOSIS — R05.1 ACUTE COUGH: ICD-10-CM

## 2024-12-30 DIAGNOSIS — U07.1 INFECTION DUE TO 2019 NOVEL CORONAVIRUS: Primary | ICD-10-CM

## 2024-12-30 PROCEDURE — 99442 PR PHYSICIAN TELEPHONE EVALUATION 11-20 MIN: CPT | Mod: 93 | Performed by: INTERNAL MEDICINE

## 2024-12-30 RX ORDER — METHYLPREDNISOLONE 4 MG/1
TABLET ORAL
Qty: 21 TABLET | Refills: 1 | Status: SHIPPED | OUTPATIENT
Start: 2024-12-30

## 2024-12-30 NOTE — PROGRESS NOTES
"Shital is a 54 year old who is being evaluated via a billable telephone visit.    What phone number would you like to be contacted at? [phone]  How would you like to obtain your AVS? MyChart  Originating Location (pt. Location): Home    Distant Location (provider location):  Off-site    Assessment & Plan     Infection due to 2019 novel coronavirus  Acute cough  - methylPREDNISolone (MEDROL DOSEPAK) 4 MG tablet therapy pack  Dispense: 21 tablet; Refill: 1            BMI  Estimated body mass index is 33.25 kg/m  as calculated from the following:    Height as of 11/22/24: 1.61 m (5' 3.39\").    Weight as of 11/22/24: 86.2 kg (190 lb).   Weight management plan: Discussed healthy diet and exercise guidelines      FUTURE APPOINTMENTS:       - Follow-up visit in 1 month    Subjective   Shital is a 54 year old, presenting for the following health issues:  Covid Concern      12/30/2024    12:24 PM   Additional Questions   Roomed by Josefina     History of Present Illness       Reason for visit:  Covid tretment - taking cilostazol.   She is taking medications regularly.         COVID-19 Symptom Review  How many days ago did these symptoms start? 12/28/2024    Are any of the following symptoms significant for you?  New or worsening difficulty breathing? No  Worsening cough? No  Fever or chills? Yes, I felt feverish or had chills.  Headache: No  Sore throat: No  Chest pain: No  Diarrhea: No  Body aches? YES    What treatments has patient tried? Decongestant - oral   Does patient live in a nursing home, group home, or shelter? No  Does patient have a way to get food/medications during quarantined? Yes, I have a friend or family member who can help me.    Current Outpatient Medications   Medication Sig Dispense Refill    cilostazol (PLETAL) 50 MG tablet Take 1 tablet (50 mg) by mouth at bedtime 90 tablet 1    EPIDUO FORTE 0.3-2.5 % gel Apply 0.3 Pump topically as needed      fexofenadine (ALLEGRA) 180 MG tablet Take 180 mg by mouth " daily      methimazole (TAPAZOLE) 5 MG tablet TAKE 1/2 TABLET BY MOUTH DAILY      methylPREDNISolone (MEDROL DOSEPAK) 4 MG tablet therapy pack Follow Package Directions 21 tablet 1    pravastatin (PRAVACHOL) 20 MG tablet Take 1 tablet (20 mg) by mouth daily. 90 tablet 3    sucralfate (CARAFATE) 1 GM tablet Take 1 tablet (1 g) by mouth 2 times daily. 60 tablet 0     No current facility-administered medications for this visit.     Past Medical History:   Diagnosis Date    Edema 10/24/2013    Gastro-oesophageal reflux disease     Heavy periods 10/24/2013    Hyperthyroidism 9/26/2014    borderline-treated with a med for 6 months in [place] and then numbers improved. had a low TSH here and referred to [facility]. numbers there were borderline but normal and no antibodies. rec. 12/14-I123 uptake was increased so Graves dz dx'd. may do methimazole if repeat TFTs show hyperthyroid    Leiomyoma of uterus, unspecified 9/26/2014    Nipple discharge 09/14    right side only. neg mammo and right breast u/s. MRI pending    PONV (postoperative nausea and vomiting)     Post-menopause on HRT (hormone replacement therapy) 6/26/2021    Pure hypercholesterolemia 6/26/2021    Varicose veins of lower extremities with other complications 10/24/2013     Social History     Socioeconomic History    Marital status:      Spouse name: Marianna    Number of children: 2    Years of education: 16    Highest education level: Not on file   Occupational History    Occupation: Program Leader     Comment: [employer]     Employer: [employer]    Tobacco Use    Smoking status: Former     Current packs/day: 0.00     Types: Cigarettes    Smokeless tobacco: Never    Tobacco comments:     Quit 31+ years ago.   Vaping Use    Vaping status: Never Used   Substance and Sexual Activity    Alcohol use: Yes     Comment: Social Drinking    Drug use: No    Sexual activity: Yes     Partners: Male     Birth control/protection: None     Comment: hysterectomy    Other Topics Concern     Service Not Asked    Blood Transfusions No    Caffeine Concern Not Asked    Occupational Exposure Not Asked    Hobby Hazards Not Asked    Sleep Concern Not Asked    Stress Concern Not Asked    Weight Concern Not Asked    Special Diet No    Back Care Not Asked    Exercise No    Bike Helmet Not Asked     Comment: NA    Seat Belt Yes    Self-Exams Not Asked    Parent/sibling w/ CABG, MI or angioplasty before 65F 55M? No   Social History Narrative    The patient was born in [facility]. She eats fruits and vegetables every day. She takes calcium supplement and vitamin D.        No advanced care directives on file         Social Drivers of Health     Financial Resource Strain: Low Risk  (6/7/2024)    Financial Resource Strain     Within the past 12 months, have you or your family members you live with been unable to get utilities (heat, electricity) when it was really needed?: No   Food Insecurity: Low Risk  (6/7/2024)    Food Insecurity     Within the past 12 months, did you worry that your food would run out before you got money to buy more?: No     Within the past 12 months, did the food you bought just not last and you didn t have money to get more?: No   Transportation Needs: Low Risk  (6/7/2024)    Transportation Needs     Within the past 12 months, has lack of transportation kept you from medical appointments, getting your medicines, non-medical meetings or appointments, work, or from getting things that you need?: No   Physical Activity: Unknown (6/7/2024)    Exercise Vital Sign     Days of Exercise per Week: 3 days     Minutes of Exercise per Session: Not on file   Stress: No Stress Concern Present (6/7/2024)    [employer] - Occupational Stress Questionnaire     Feeling of Stress : Not at all   Social Connections: Unknown (6/7/2024)    Social Connection and Isolation Panel [NHANES]     Frequency of Communication with Friends and Family: Not on  file     Frequency of Social Gatherings with Friends and Family: More than three times a week     Attends Anabaptist Services: Not on file     Active Member of Clubs or Organizations: Not on file     Attends Club or Organization Meetings: Not on file     Marital Status: Not on file   Interpersonal Safety: Low Risk  (10/20/2023)    Interpersonal Safety     Do you feel physically and emotionally safe where you currently live?: Yes     Within the past 12 months, have you been hit, slapped, kicked or otherwise physically hurt by someone?: No     Within the past 12 months, have you been humiliated or emotionally abused in other ways by your partner or ex-partner?: No   Housing Stability: Low Risk  (6/7/2024)    Housing Stability     Do you have housing? : Yes     Are you worried about losing your housing?: No               Review of Systems  Constitutional, HEENT, cardiovascular, pulmonary, GI, , musculoskeletal, neuro, skin, endocrine and psych systems are negative, except as otherwise noted.      Objective           Vitals:  No vitals were obtained today due to virtual visit.    Physical Exam   General: Alert and no distress //Respiratory: positive for cough // Psychiatric:  Appropriate affect, tone, and pace of words      Labs reviewed in Epic        Phone visit duration including chart review medication management: 12 minutes  Signed Electronically by: Effie Sultana MD

## 2025-01-01 ENCOUNTER — NURSE TRIAGE (OUTPATIENT)
Dept: NURSING | Facility: CLINIC | Age: 55
End: 2025-01-01
Payer: COMMERCIAL

## 2025-01-01 NOTE — TELEPHONE ENCOUNTER
Patient spoke with a provider on 12/30 for Covid and was prescribed methylprednisolone because she is unable to take Paxlovid.     Patient calling today as her sinus drainage has turned green and she has a bit of a headache in the front by her temples. Pain rating 6.5/10. Patient has used Dayquill which helps with the runny nose yesterday.   Denies fever, redness.  Protocol recommends home care  Care advice given. If symptoms worsen patient will call back  Lani Jeffries RN   01/01/25 11:32 AM  [facility] Nurse Advisor           Reason for Disposition   [1] Sinus congestion as part of a cold AND [2] present < 10 days    Additional Information   Negative: SEVERE difficulty breathing (e.g., struggling for each breath, speaks in single words)   Negative: Sounds like a life-threatening emergency to the triager   Negative: [1] Sinus infection AND [2] taking an antibiotic AND [3] symptoms continue   Negative: [1] Difficulty breathing AND [2] not from stuffy nose (e.g., not relieved by cleaning out the nose)   Negative: [1] SEVERE headache AND [2] fever   Negative: [1] Redness or swelling on the cheek, forehead or around the eye AND [2] fever   Negative: Fever > 104 F (40 C)   Negative: Patient sounds very sick or weak to the triager   Negative: [1] SEVERE pain AND [2] not improved 2 hours after pain medicine   Negative: [1] Redness or swelling on the cheek, forehead or around the eye AND [2] no fever   Negative: [1] Fever > 101 F (38.3 C) AND [2] age > 60 years   Negative: [1] Fever > 100.0 F (37.8 C) AND [2] bedridden (e.g., CVA, chronic illness, recovering from surgery)   Negative: [1] Fever > 100.0 F (37.8 C) AND [2] diabetes mellitus or weak immune system (e.g., HIV positive, cancer chemo, splenectomy, organ transplant, chronic steroids)   Negative: Fever present > 3 days (72 hours)   Negative: [1] Fever returns after gone for over 24 hours AND [2] symptoms worse or not improved   Negative: [1] Sinus pain (not  just congestion) AND [2] fever   Negative: Earache   Negative: [1] Sinus congestion (pressure, fullness) AND [2] present > 10 days   Negative: [1] Nasal discharge AND [2] present > 10 days   Negative: [1] Using nasal washes and pain medicine > 24 hours AND [2] sinus pain (around cheekbone or eye) persists   Negative: Lots of coughing    Protocols used: Sinus Pain or Congestion-A-AH

## 2025-02-07 ENCOUNTER — LAB REQUISITION (OUTPATIENT)
Dept: LAB | Facility: CLINIC | Age: 55
End: 2025-02-07
Payer: COMMERCIAL

## 2025-02-07 DIAGNOSIS — L08.9 LOCAL INFECTION OF THE SKIN AND SUBCUTANEOUS TISSUE, UNSPECIFIED: ICD-10-CM

## 2025-02-07 DIAGNOSIS — R21 RASH AND OTHER NONSPECIFIC SKIN ERUPTION: ICD-10-CM

## 2025-02-07 PROCEDURE — 87529 HSV DNA AMP PROBE: CPT | Mod: ORL | Performed by: DERMATOLOGY

## 2025-02-07 PROCEDURE — 87077 CULTURE AEROBIC IDENTIFY: CPT | Mod: ORL | Performed by: DERMATOLOGY

## 2025-02-08 LAB
HSV1 DNA SPEC QL NAA+PROBE: NOT DETECTED
HSV2 DNA SPEC QL NAA+PROBE: NOT DETECTED
SPECIMEN TYPE: NORMAL

## 2025-02-09 LAB — BACTERIA WND CULT: ABNORMAL

## 2025-02-25 ENCOUNTER — TELEPHONE (OUTPATIENT)
Dept: OBGYN | Facility: CLINIC | Age: 55
End: 2025-02-25
Payer: COMMERCIAL

## 2025-02-25 NOTE — TELEPHONE ENCOUNTER
[facility] Call Center    Phone Message    May a detailed message be left on voicemail: yes     Reason for Call: Other: Patient needs to r/s her appointment with Dr. Arceo due to provider being unavailable, patient is upset because next Friday opening in June. Patient is unable to shift her schedule around and would like call back from team due to not wanting to wait until June for her appointment. Please review and call patient- [phone].     Action Taken: Message routed to:  Other: [facility]    Travel Screening: Not Applicable     Date of Service:

## 2025-03-06 ENCOUNTER — TRANSFERRED RECORDS (OUTPATIENT)
Dept: HEALTH INFORMATION MANAGEMENT | Facility: CLINIC | Age: 55
End: 2025-03-06
Payer: COMMERCIAL

## 2025-03-21 ENCOUNTER — OFFICE VISIT (OUTPATIENT)
Dept: OBGYN | Facility: CLINIC | Age: 55
End: 2025-03-21
Payer: COMMERCIAL

## 2025-03-21 VITALS
BODY MASS INDEX: 33.2 KG/M2 | DIASTOLIC BLOOD PRESSURE: 78 MMHG | SYSTOLIC BLOOD PRESSURE: 110 MMHG | HEIGHT: 63 IN | WEIGHT: 187.4 LBS

## 2025-03-21 DIAGNOSIS — B37.9 YEAST INFECTION: ICD-10-CM

## 2025-03-21 DIAGNOSIS — Z90.711 S/P ABDOMINAL SUPRACERVICAL SUBTOTAL HYSTERECTOMY: ICD-10-CM

## 2025-03-21 DIAGNOSIS — Z01.419 ENCOUNTER FOR GYNECOLOGICAL EXAMINATION WITHOUT ABNORMAL FINDING: Primary | ICD-10-CM

## 2025-03-21 DIAGNOSIS — E05.90 HYPERTHYROIDISM: ICD-10-CM

## 2025-03-21 PROCEDURE — 3074F SYST BP LT 130 MM HG: CPT | Performed by: OBSTETRICS & GYNECOLOGY

## 2025-03-21 PROCEDURE — 3078F DIAST BP <80 MM HG: CPT | Performed by: OBSTETRICS & GYNECOLOGY

## 2025-03-21 PROCEDURE — 99396 PREV VISIT EST AGE 40-64: CPT | Performed by: OBSTETRICS & GYNECOLOGY

## 2025-03-21 RX ORDER — FLUCONAZOLE 150 MG/1
150 TABLET ORAL EVERY OTHER DAY
Qty: 2 TABLET | Refills: 1 | Status: SHIPPED | OUTPATIENT
Start: 2025-03-21 | End: 2025-03-24

## 2025-03-21 NOTE — PROGRESS NOTES
Shital is a 54 year old  female who presents for annual exam.     Besides routine health maintenance, she has no other health concerns today .    HPI:  The patient's PCP is Dr. Tim Hill MD.     Patient is here for her gyn exam and has her PCP as above for her primary care and labs as well as endo for her hyperthyroidism.    Had LASH  and no bleeding/periods since that point.  Fairly soon after had bad v.m sx so did vivelle for several years. Stopped for a year but sx returned so went back on  at 0.075mg and then one year later stopped again and off since then. Has fairly mild v.m sx at this point and much less intense and shorter lived so feels like definitely manageable without going back on ERT    Of course has some of the other sx that are both age and stress and menopause like fatigue and some brain fog and things as well as difficulty losing weight even when really trying to eat healthy and exercise. Most of her weight gain happened around  to  and mostly stable in upper 180s since then which is a BMI of 32-33.   Typically eats healthy and more mediterranean diet. Was much more active when in school and moving around. 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 No new illnesses or abx or changes otherwise    Her last mammo was done end of  so offset from her annual with me so will scheduled for that at the one year arely          GYNECOLOGIC HISTORY:    Patient's last menstrual period was 10/26/2014.    Her current contraception method is: hysterectomy.  She  reports that she has quit smoking. Her smoking use included cigarettes. She has never used  smokeless tobacco.    Patient is sexually active.  STD testing offered?  Declined  Last PHQ-9 score on record =       2024    11:37 AM   PHQ-9 SCORE   PHQ-9 Total Score 0     Last GAD7 score on record =       2024    11:37 AM   RACHEL-7 SCORE   Total Score 0     Alcohol Score =     HEALTH MAINTENANCE:  Cholesterol:   Recent Labs   Lab Test 24  1139 23  1334   CHOL 192 207*   HDL 47* 56   * 132*   TRIG 140 97     Last Mammo:  2024 , Result: Normal, Next Mammo: 2025  Pap:  Lab Results   Component Value Date    GYNINTERP  2024     Negative for Intraepithelial Lesion or Malignancy (NILM)    PAP NIL 07/15/2019    PAP NIL 10/23/2012     Colonoscopy: 2024, Result: Normal, Next Colonoscopy: 5 years.  Dexa:  8/10/22    Health maintenance updated:  Yes    HISTORY:  OB History    Para Term  AB Living   6 2 2 0 4 2   SAB IAB Ectopic Multiple Live Births   2 2 0 0 2      # Outcome Date GA Lbr Rajesh/2nd Weight Sex Type Anes PTL Lv   6 Term 10/01/98    F CS-LTranv   ARASELI      Name: Myrto   5 Term 96    F CS-LTranv   ARASELI      Name: Nefeli   4 IAB            3 IAB            2 SAB            1 SAB                Patient Active Problem List   Diagnosis    Varicose veins of both legs with edema    Hyperthyroidism    S/P abdominal supracervical subtotal hysterectomy    Elevated fasting glucose    Pure hypercholesterolemia    Symptoms, such as flushing, sleeplessness, headache, lack of concentration, associated with the menopause    Left knee pain     Past Surgical History:   Procedure Laterality Date    APPENDECTOMY       SECTION      CHOLECYSTECTOMY, LAPOROSCOPIC  2012    Cholecystectomy, Laparoscopic    COLONOSCOPY N/A 2017    Procedure: COLONOSCOPY;  COLONOSCOPY;  Surgeon: Shannon Noel MD;  Location:  GI    COLONOSCOPY N/A 2019    Procedure: COLONOSCOPY;  Surgeon: Shannon Noel MD;  Location:  GI    HYSTERECTOMY SUPRACERVICAL  N/A 11/11/2014    Procedure: HYSTERECTOMY SUPRACERVICAL;  Surgeon: Shauna Arceo MD;  Location: [facility] OR SChyst and LSO done by Adis/ Leora. started as laparoscopic but converted to open due to adhesions    LAPAROSCOPIC CYSTECTOMY OVARIAN (ONCOLOGY)      right. benign cyst. partial oopherectomy in [facility]    LAPAROSCOPIC SALPINGO-OOPHORECTOMY  11/11/14    MYOMECTOMY UTERUS  2008    hysteroscopic    TONSILLECTOMY  1979      Social History     Tobacco Use    Smoking status: Former     Current packs/day: 0.00     Types: Cigarettes    Smokeless tobacco: Never    Tobacco comments:     Quit 31+ years ago.   Substance Use Topics    Alcohol use: Yes     Comment: Social Drinking      Problem (# of Occurrences) Relation (Name,Age of Onset)    Cancer (1) Mother (Lyndsey Wolff): melanoma    Diabetes (2) Mother (Lyndsey Wolff), Maternal Grandmother (Mallorie Wolff)    Thyroid Disease (1) Cousin (Vianey Gruber)    Breast Cancer (2) Maternal Aunt, Other (Ynes Gruber): Maternal Sister    Coronary Artery Disease (1) Father (Arnie Wheatley)    Colon Polyps (1) Mother (Lyndsey Wolff)    No Known Problems (4) Sister, Brother, Maternal Grandfather, Paternal Grandmother           Negative family history of: Asthma              Current Outpatient Medications   Medication Sig Dispense Refill    cilostazol (PLETAL) 50 MG tablet Take 1 tablet (50 mg) by mouth at bedtime 90 tablet 1    doxycycline monohydrate (ADOXA) 100 MG tablet take 1 tablet by mouth twice a day with food      EPIDUO FORTE 0.3-2.5 % gel Apply 0.3 Pump topically as needed      fexofenadine (ALLEGRA) 180 MG tablet Take 180 mg by mouth daily      methimazole (TAPAZOLE) 5 MG tablet TAKE 1/2 TABLET BY MOUTH DAILY      metroNIDAZOLE (METROCREAM) 0.75 % external cream       pravastatin (PRAVACHOL) 20 MG tablet Take 1 tablet (20 mg) by mouth daily. 90 tablet 3    sucralfate (CARAFATE) 1 GM tablet Take 1 tablet (1 g) by mouth 2 times daily. 60 tablet 0    valACYclovir (VALTREX) 1000  "mg tablet TAKE 2 TABS BY MOUTH EVERY 12 HOURS FOR 2 DOSES WITH FLARES       No current facility-administered medications for this visit.     Allergies   Allergen Reactions    Lactose Intolerance (Gi) GI Disturbance    Penicillins     Seasonal Allergies     Amoxicillin Rash       Past medical, surgical, social and family histories were reviewed and updated in EPIC.    EXAM:  /78   Ht 1.607 m (5' 3.25\")   Wt 85 kg (187 lb 6.4 oz)   LMP 10/26/2014   Breastfeeding No   BMI 32.93 kg/m     BMI: Body mass index is 32.93 kg/m .    PHYSICAL EXAM:  Constitutional:   Appearance: Well nourished, well developed, alert, in no acute distress  Neck:  Lymph Nodes:  No lymphadenopathy present    Thyroid:  Gland size normal, nontender, no nodules or masses present  on palpation  Chest:  Respiratory Effort:  Breathing unlabored,CTA bilaterally  Cardiovascular:    Heart: Auscultation:  Regular rate, normal rhythm, no murmurs present  Breasts: Axillary Lymph Nodes:  No lymphadenopathy present. and No nodularity, asymmetry or nipple discharge bilaterally.  Gastrointestinal:   Abdominal Examination:  Abdomen nontender to palpation, tone normal without rigidity or guarding, no masses present, umbilicus without lesions   Liver and Spleen:  No hepatomegaly present, liver nontender to palpation    Hernias:  No hernias present  Lymphatic: Lymph Nodes:  No other lymphadenopathy present  Skin:  General Inspection:  No rashes present, no lesions present, no areas of  discoloration  Neurologic:    Mental Status:  Oriented X3.  Normal strength and tone, sensory exam                grossly normal, mentation intact and speech normal.    Psychiatric:   Mentation appears normal and affect normal/bright.         Pelvic Exam:  External Genitalia:     Normal appearance for age, ERYTHEMATOUS WITH SOME CLUMPY WHITE DISCHARGE IN A MIX OF MILKY WHITE  Vagina:     Normal vaginal vault without central or paravaginal defects, ERYTHEMATOUS WITH SOME " CLUMPY WHITE DISCHARGE IN A MIX OF MILKY WHITE  Bladder:     Nontender to palpation  Urethra:   Urethral Body:  Urethra palpation normal, urethra structural support normal   Urethral Meatus:  No erythema or lesions present  Cervix:     Appearance healthy, no lesions present, nontender to palpation, no bleeding present  Uterus:     Surgically absent  Adnexa:     No adnexal tenderness present, no adnexal masses present  Perineum:     Perineum within normal limits, no evidence of trauma, no rashes or skin lesions present  Anus:     Anus within normal limits, no hemorrhoids present  Inguinal Lymph Nodes:     No lymphadenopathy present  Pubic Hair:     Normal pubic hair distribution for age  Genitalia and Groin:     No rashes present, no lesions present, no areas of discoloration, no masses present    COUNSELING:   Reviewed preventive health counseling, as reflected in patient instructions  Special attention given to:        Regular exercise       Healthy diet/nutrition       (Esther)menopause management    BMI: Body mass index is 32.93 kg/m .  Weight management plan: Discussed healthy diet and exercise guidelines Patient was referred to their PCP to discuss a diet and exercise plan.    ASSESSMENT:  54 year old female with satisfactory annual exam.    ICD-10-CM    1. Encounter for gynecological examination without abnormal finding  Z01.419       2. Yeast infection  B37.9 fluconazole (DIFLUCAN) 150 MG tablet      3. S/P abdominal supracervical subtotal hysterectomy  Z90.711       4. Hyperthyroidism  E05.90           PLAN:  Pap is UTD for 4 more years.   Can follow routine ASCCP guidelines     Mammo to be scheduled after June 21st this year    C.S is UTD for 3.5 more years    Fasting labs deferred to her PCP     Additional health issues addressed at today's visit include:    Patient has clinically suspicious exam for yeast.    MVP swab was attempted to be obtained but processing error occurred  However, clinical exam was c/w  this so rx for diflucan 150mg po x1 and repeat x1 in 48 hrs was sent in for her    Should consider bone density in the next year or so given off HRT and hyperthyroidism risk factors  Likely menopausal at time or just after hyst given acute onset of v.m sx [facility] so approximately 10 y[facility] or so, though off ERT for only 2 yrs      Shauna Arceo MD

## 2025-05-01 PROBLEM — Z79.890 POST-MENOPAUSE ON HRT (HORMONE REPLACEMENT THERAPY): Status: RESOLVED | Noted: 2021-06-26 | Resolved: 2025-05-01

## 2025-05-28 ENCOUNTER — TELEPHONE (OUTPATIENT)
Dept: ORTHOPEDICS | Facility: CLINIC | Age: 55
End: 2025-05-28

## 2025-05-28 ENCOUNTER — E-VISIT (OUTPATIENT)
Dept: FAMILY MEDICINE | Facility: CLINIC | Age: 55
End: 2025-05-28
Payer: COMMERCIAL

## 2025-05-28 ENCOUNTER — LAB (OUTPATIENT)
Dept: LAB | Facility: CLINIC | Age: 55
End: 2025-05-28
Payer: COMMERCIAL

## 2025-05-28 ENCOUNTER — RESULTS FOLLOW-UP (OUTPATIENT)
Dept: FAMILY MEDICINE | Facility: CLINIC | Age: 55
End: 2025-05-28

## 2025-05-28 ENCOUNTER — TELEPHONE (OUTPATIENT)
Dept: FAMILY MEDICINE | Facility: CLINIC | Age: 55
End: 2025-05-28

## 2025-05-28 DIAGNOSIS — R30.0 DYSURIA: Primary | ICD-10-CM

## 2025-05-28 DIAGNOSIS — R30.0 DYSURIA: ICD-10-CM

## 2025-05-28 LAB
ALBUMIN UR-MCNC: NEGATIVE MG/DL
APPEARANCE UR: CLEAR
BACTERIA #/AREA URNS HPF: ABNORMAL /HPF
BILIRUB UR QL STRIP: NEGATIVE
COLOR UR AUTO: YELLOW
GLUCOSE UR STRIP-MCNC: NEGATIVE MG/DL
HGB UR QL STRIP: ABNORMAL
KETONES UR STRIP-MCNC: NEGATIVE MG/DL
LEUKOCYTE ESTERASE UR QL STRIP: ABNORMAL
NITRATE UR QL: NEGATIVE
PH UR STRIP: 5.5 [PH] (ref 5–7)
RBC #/AREA URNS AUTO: ABNORMAL /HPF
SP GR UR STRIP: <=1.005 (ref 1–1.03)
SQUAMOUS #/AREA URNS AUTO: ABNORMAL /LPF
UROBILINOGEN UR STRIP-ACNC: 0.2 E.U./DL
WBC #/AREA URNS AUTO: ABNORMAL /HPF

## 2025-05-28 PROCEDURE — 87086 URINE CULTURE/COLONY COUNT: CPT

## 2025-05-28 PROCEDURE — 81001 URINALYSIS AUTO W/SCOPE: CPT

## 2025-05-28 PROCEDURE — 87186 SC STD MICRODIL/AGAR DIL: CPT

## 2025-05-28 RX ORDER — NITROFURANTOIN 25; 75 MG/1; MG/1
100 CAPSULE ORAL 2 TIMES DAILY
Qty: 14 CAPSULE | Refills: 0 | Status: SHIPPED | OUTPATIENT
Start: 2025-05-28

## 2025-05-28 NOTE — TELEPHONE ENCOUNTER
Patient called the clinic and stated that she has been having increased urinary frequency, cloudy urine, burning with urination, blood in urine. Patient stated that she will be leaving tomorrow and would like to get an antibiotic if needed before leaving. Advised patient to complete an E-Visit. Trippy message with link was sent to patient. Assisted patient with completing E-Visit. Patient was scheduled for a lab only appointment today. She had no further questions at this time.     Wanda FUNEZ RN  [facility] Triage Team

## 2025-05-28 NOTE — TELEPHONE ENCOUNTER
Pt calling asking further questions about result message. Pt advised to continue abx as provider recommended until culture comes back.    Gisele Mendez RN

## 2025-05-28 NOTE — PATIENT INSTRUCTIONS
Urinary Tract Infection (UTI) in Women: Care Instructions  Overview     A urinary tract infection (UTI) is an infection caused by bacteria. It can happen anywhere in the urinary tract. A UTI can happen in the:  Kidneys.  Ureters, the tubes that connect the kidneys to the bladder.  Bladder.  Urethra, where the urine comes out.  Most UTIs are bladder infections. They often cause pain or burning when you urinate.  Most UTIs can be cured with antibiotics. If you are prescribed antibiotics, be sure to complete your treatment so that the infection does not get worse.  Follow-up care is a key part of your treatment and safety. Be sure to make and go to all appointments, and call your doctor if you are having problems. It's also a good idea to know your test results and keep a list of the medicines you take.  How can you care for yourself at home?  Take your antibiotics as directed. Do not stop taking them just because you feel better. You need to take the full course of antibiotics.  Drink extra water and other fluids for the next day or two. This will help make the urine less concentrated and help wash out the bacteria that are causing the infection. (If you have kidney, heart, or liver disease and have to limit fluids, talk with your doctor before you increase the amount of fluids you drink.)  Avoid drinks that are carbonated or have caffeine. They can irritate the bladder.  Urinate often. Try to empty your bladder each time.  To relieve pain, take a hot bath or lay a heating pad set on low over your lower belly or genital area. Never go to sleep with a heating pad in place.  To prevent UTIs  Drink plenty of water each day. This helps you urinate often, which clears bacteria from your system. (If you have kidney, heart, or liver disease and have to limit fluids, talk with your doctor before you increase the amount of fluids you drink.)  Urinate when you need to.  If you are sexually active, urinate right after you have  "sex.  Change sanitary pads often.  Avoid douches, bubble baths, feminine hygiene sprays, and other feminine hygiene products that have deodorants.  After going to the bathroom, wipe from front to back.  When should you call for help?   Call your doctor now or seek immediate medical care if:    You have new or worse fever, chills, nausea, or vomiting.     You have new pain in your back just below your rib cage. This is called flank pain.     There is new blood or pus in your urine.     You have any problems with your antibiotic medicine.   Watch closely for changes in your health, and be sure to contact your doctor if:    You are not getting better after taking an antibiotic for 2 days.     Your symptoms go away but then come back.   Where can you learn more?  Go to https://www.[employer].net/patiented  Enter K848 in the search box to learn more about \"Urinary Tract Infection (UTI) in Women: Care Instructions.\"  Current as of: April 30, 2024  Content Version: 14.4    6058-5477 [employer].   Care instructions adapted under license by your healthcare professional. If you have questions about a medical condition or this instruction, always ask your healthcare professional. [employer] disclaims any warranty or liability for your use of this information.    "

## 2025-05-28 NOTE — TELEPHONE ENCOUNTER
Pt dropped off lab sample and was asking what to expect with results and how an evisit works.

## 2025-05-29 LAB — BACTERIA UR CULT: ABNORMAL

## 2025-06-02 NOTE — TELEPHONE ENCOUNTER
Left voicemail for patient to return call to discuss upcoming appointment.     If patient returns call, please reschedule visit with Hiren Diaz or Dr. Malone.     Adelina Lux ATC

## 2025-06-03 ENCOUNTER — TELEPHONE (OUTPATIENT)
Dept: ORTHOPEDICS | Facility: CLINIC | Age: 55
End: 2025-06-03
Payer: COMMERCIAL

## 2025-06-03 NOTE — TELEPHONE ENCOUNTER
Other: pt calling and not understanding why she needs to see someone named Hayes?      Could we send this information to you in [facility] or would you prefer to receive a phone call?:   Patient would prefer a phone call   Okay to leave a detailed message?: Yes at Cell number on file:    Telephone Information:   Mobile [phone]

## 2025-06-03 NOTE — TELEPHONE ENCOUNTER
Phone call to patient and explained that Hayes Mack PA-C works with Dr. Malone. Patient may see either of them. Patient would like to reschedule to July. Appointment scheduled for 7/11/25 at 1:20 pm with 1:05 pm check in . She verbalized understanding.     CARTER Dorsey RN

## 2025-07-22 ENCOUNTER — TELEPHONE (OUTPATIENT)
Dept: FAMILY MEDICINE | Facility: CLINIC | Age: 55
End: 2025-07-22
Payer: COMMERCIAL

## 2025-07-22 NOTE — TELEPHONE ENCOUNTER
Reason for Call:  Appointment Request    Patient requesting this type of appt:  Preventive     Requested provider: Tim Hill    Reason patient unable to be scheduled: Pt is scheduled for 9/8/25 but would like an earlier Monday appt.     When does patient want to be seen/preferred time: ASAP    Comments: Pt would also like noted that she is going to talk to pcp about travel vaccinations needed and  would also need those recommended vaccinations.    Could we [facility] this information to you [facility] or would you prefer to receive a phone call?:   No preference   Okay to leave a detailed message?: Yes at Home number on file [phone] (home)    Call taken on 7/22/2025 at 9:14 AM by Beatrice Pike

## 2025-08-01 ENCOUNTER — ANCILLARY PROCEDURE (OUTPATIENT)
Dept: MAMMOGRAPHY | Facility: CLINIC | Age: 55
End: 2025-08-01
Attending: FAMILY MEDICINE
Payer: COMMERCIAL

## 2025-08-01 PROCEDURE — 77063 BREAST TOMOSYNTHESIS BI: CPT | Mod: TC | Performed by: RADIOLOGY

## 2025-08-01 PROCEDURE — 77067 SCR MAMMO BI INCL CAD: CPT | Mod: TC | Performed by: RADIOLOGY

## 2025-08-17 SDOH — HEALTH STABILITY: PHYSICAL HEALTH: ON AVERAGE, HOW MANY MINUTES DO YOU ENGAGE IN EXERCISE AT THIS LEVEL?: 30 MIN

## 2025-08-17 SDOH — HEALTH STABILITY: PHYSICAL HEALTH: ON AVERAGE, HOW MANY DAYS PER WEEK DO YOU ENGAGE IN MODERATE TO STRENUOUS EXERCISE (LIKE A BRISK WALK)?: 2 DAYS

## 2025-08-17 ASSESSMENT — SOCIAL DETERMINANTS OF HEALTH (SDOH): HOW OFTEN DO YOU GET TOGETHER WITH FRIENDS OR RELATIVES?: THREE TIMES A WEEK

## 2025-08-18 ENCOUNTER — OFFICE VISIT (OUTPATIENT)
Dept: FAMILY MEDICINE | Facility: CLINIC | Age: 55
End: 2025-08-18
Payer: COMMERCIAL

## 2025-08-18 VITALS
SYSTOLIC BLOOD PRESSURE: 106 MMHG | DIASTOLIC BLOOD PRESSURE: 68 MMHG | HEIGHT: 63 IN | WEIGHT: 188 LBS | RESPIRATION RATE: 18 BRPM | HEART RATE: 73 BPM | BODY MASS INDEX: 33.31 KG/M2 | TEMPERATURE: 97.2 F | OXYGEN SATURATION: 98 %

## 2025-08-18 DIAGNOSIS — Z00.00 ROUTINE GENERAL MEDICAL EXAMINATION AT A HEALTH CARE FACILITY: Primary | ICD-10-CM

## 2025-08-18 LAB
ALBUMIN SERPL BCG-MCNC: 4.4 G/DL (ref 3.5–5.2)
ALP SERPL-CCNC: 51 U/L (ref 40–150)
ALT SERPL W P-5'-P-CCNC: 34 U/L (ref 0–50)
ANION GAP SERPL CALCULATED.3IONS-SCNC: 11 MMOL/L (ref 7–15)
AST SERPL W P-5'-P-CCNC: 28 U/L (ref 0–45)
BILIRUB SERPL-MCNC: 0.4 MG/DL
BUN SERPL-MCNC: 14.4 MG/DL (ref 6–20)
CALCIUM SERPL-MCNC: 9.5 MG/DL (ref 8.8–10.4)
CHLORIDE SERPL-SCNC: 103 MMOL/L (ref 98–107)
CHOLEST SERPL-MCNC: 206 MG/DL
CREAT SERPL-MCNC: 0.68 MG/DL (ref 0.51–0.95)
EGFRCR SERPLBLD CKD-EPI 2021: >90 ML/MIN/1.73M2
ERYTHROCYTE [DISTWIDTH] IN BLOOD BY AUTOMATED COUNT: 11.9 % (ref 10–15)
EST. AVERAGE GLUCOSE BLD GHB EST-MCNC: 105 MG/DL
FASTING STATUS PATIENT QL REPORTED: ABNORMAL
FASTING STATUS PATIENT QL REPORTED: NORMAL
GLUCOSE SERPL-MCNC: 94 MG/DL (ref 70–99)
HBA1C MFR BLD: 5.3 % (ref 0–5.6)
HCO3 SERPL-SCNC: 25 MMOL/L (ref 22–29)
HCT VFR BLD AUTO: 42.4 % (ref 35–47)
HDLC SERPL-MCNC: 54 MG/DL
HGB BLD-MCNC: 14.6 G/DL (ref 11.7–15.7)
LDLC SERPL CALC-MCNC: 132 MG/DL
MCH RBC QN AUTO: 30.4 PG (ref 26.5–33)
MCHC RBC AUTO-ENTMCNC: 34.4 G/DL (ref 31.5–36.5)
MCV RBC AUTO: 88.1 FL (ref 78–100)
NONHDLC SERPL-MCNC: 152 MG/DL
PLATELET # BLD AUTO: 232 10E3/UL (ref 150–450)
POTASSIUM SERPL-SCNC: 4.6 MMOL/L (ref 3.4–5.3)
PROT SERPL-MCNC: 7.2 G/DL (ref 6.4–8.3)
RBC # BLD AUTO: 4.81 10E6/UL (ref 3.8–5.2)
SODIUM SERPL-SCNC: 139 MMOL/L (ref 135–145)
TRIGL SERPL-MCNC: 101 MG/DL
TSH SERPL DL<=0.005 MIU/L-ACNC: 0.86 UIU/ML (ref 0.3–4.2)
WBC # BLD AUTO: 4.07 10E3/UL (ref 4–11)

## 2025-08-18 PROCEDURE — 85027 COMPLETE CBC AUTOMATED: CPT | Performed by: FAMILY MEDICINE

## 2025-08-18 PROCEDURE — 84443 ASSAY THYROID STIM HORMONE: CPT | Performed by: FAMILY MEDICINE

## 2025-08-18 PROCEDURE — 3044F HG A1C LEVEL LT 7.0%: CPT | Performed by: FAMILY MEDICINE

## 2025-08-18 PROCEDURE — 80053 COMPREHEN METABOLIC PANEL: CPT | Performed by: FAMILY MEDICINE

## 2025-08-18 PROCEDURE — 1126F AMNT PAIN NOTED NONE PRSNT: CPT | Performed by: FAMILY MEDICINE

## 2025-08-18 PROCEDURE — 3078F DIAST BP <80 MM HG: CPT | Performed by: FAMILY MEDICINE

## 2025-08-18 PROCEDURE — 90677 PCV20 VACCINE IM: CPT | Performed by: FAMILY MEDICINE

## 2025-08-18 PROCEDURE — 90471 IMMUNIZATION ADMIN: CPT | Performed by: FAMILY MEDICINE

## 2025-08-18 PROCEDURE — 3074F SYST BP LT 130 MM HG: CPT | Performed by: FAMILY MEDICINE

## 2025-08-18 PROCEDURE — 80061 LIPID PANEL: CPT | Performed by: FAMILY MEDICINE

## 2025-08-18 PROCEDURE — 36415 COLL VENOUS BLD VENIPUNCTURE: CPT | Performed by: FAMILY MEDICINE

## 2025-08-18 PROCEDURE — 83036 HEMOGLOBIN GLYCOSYLATED A1C: CPT | Performed by: FAMILY MEDICINE

## 2025-08-18 PROCEDURE — 99396 PREV VISIT EST AGE 40-64: CPT | Mod: 25 | Performed by: FAMILY MEDICINE

## 2025-08-18 ASSESSMENT — PAIN SCALES - GENERAL: PAINLEVEL_OUTOF10: NO PAIN (0)

## 2025-08-22 ENCOUNTER — ANCILLARY PROCEDURE (OUTPATIENT)
Dept: GENERAL RADIOLOGY | Facility: CLINIC | Age: 55
End: 2025-08-22
Attending: STUDENT IN AN ORGANIZED HEALTH CARE EDUCATION/TRAINING PROGRAM
Payer: COMMERCIAL

## 2025-08-22 DIAGNOSIS — M17.12 OSTEOARTHROSIS, LOCALIZED, PRIMARY, KNEE, LEFT: ICD-10-CM

## 2025-08-22 PROCEDURE — 73562 X-RAY EXAM OF KNEE 3: CPT | Mod: TC | Performed by: RADIOLOGY

## (undated) RX ORDER — FENTANYL CITRATE 50 UG/ML
INJECTION, SOLUTION INTRAMUSCULAR; INTRAVENOUS
Status: DISPENSED
Start: 2017-06-22

## (undated) RX ORDER — FENTANYL CITRATE 50 UG/ML
INJECTION, SOLUTION INTRAMUSCULAR; INTRAVENOUS
Status: DISPENSED
Start: 2019-11-27